# Patient Record
Sex: FEMALE | Race: WHITE | NOT HISPANIC OR LATINO | Employment: OTHER | URBAN - METROPOLITAN AREA
[De-identification: names, ages, dates, MRNs, and addresses within clinical notes are randomized per-mention and may not be internally consistent; named-entity substitution may affect disease eponyms.]

---

## 2017-04-26 ENCOUNTER — TELEPHONE (OUTPATIENT)
Dept: INTERNAL MEDICINE | Facility: CLINIC | Age: 77
End: 2017-04-26

## 2017-04-26 DIAGNOSIS — M85.80 OSTEOPENIA: ICD-10-CM

## 2017-04-26 DIAGNOSIS — E78.5 HYPERLIPIDEMIA, UNSPECIFIED HYPERLIPIDEMIA TYPE: Primary | ICD-10-CM

## 2017-04-26 DIAGNOSIS — Z79.899 ENCOUNTER FOR LONG-TERM (CURRENT) USE OF MEDICATIONS: ICD-10-CM

## 2017-04-27 ENCOUNTER — CLINICAL SUPPORT (OUTPATIENT)
Dept: INTERNAL MEDICINE | Facility: CLINIC | Age: 77
End: 2017-04-27

## 2017-04-27 ENCOUNTER — TELEPHONE (OUTPATIENT)
Dept: INTERNAL MEDICINE | Facility: CLINIC | Age: 77
End: 2017-04-27

## 2017-04-27 DIAGNOSIS — E78.49 OTHER HYPERLIPIDEMIA: Primary | ICD-10-CM

## 2017-04-27 DIAGNOSIS — Z79.899 ENCOUNTER FOR LONG-TERM (CURRENT) USE OF MEDICATIONS: ICD-10-CM

## 2017-04-27 DIAGNOSIS — E78.5 HYPERLIPIDEMIA, UNSPECIFIED HYPERLIPIDEMIA TYPE: Primary | ICD-10-CM

## 2017-04-27 DIAGNOSIS — M85.80 OSTEOPENIA: ICD-10-CM

## 2017-04-27 LAB
ALBUMIN SERPL-MCNC: 5.2 G/DL (ref 3.2–4.8)
ALBUMIN/GLOB SERPL: 2 G/DL (ref 1.5–2.5)
ALP SERPL-CCNC: 88 U/L (ref 25–100)
ALT SERPL-CCNC: 24 U/L (ref 7–40)
AST SERPL-CCNC: 34 U/L (ref 0–33)
BASOPHILS # BLD AUTO: 0.05 10*3/MM3 (ref 0–0.2)
BASOPHILS NFR BLD AUTO: 0.9 % (ref 0–1)
BILIRUB SERPL-MCNC: 1.1 MG/DL (ref 0.3–1.2)
BUN SERPL-MCNC: 14 MG/DL (ref 9–23)
BUN/CREAT SERPL: 15.6 (ref 7–25)
CALCIUM SERPL-MCNC: 11.4 MG/DL (ref 8.7–10.4)
CHLORIDE SERPL-SCNC: 105 MMOL/L (ref 99–109)
CHOLEST SERPL-MCNC: 189 MG/DL (ref 0–200)
CO2 SERPL-SCNC: 33 MMOL/L (ref 20–31)
CREAT SERPL-MCNC: 0.9 MG/DL (ref 0.6–1.3)
EOSINOPHIL # BLD AUTO: 0.11 10*3/MM3 (ref 0.1–0.3)
EOSINOPHIL NFR BLD AUTO: 1.9 % (ref 0–3)
ERYTHROCYTE [DISTWIDTH] IN BLOOD BY AUTOMATED COUNT: 12.8 % (ref 11.3–14.5)
GLOBULIN SER CALC-MCNC: 2.6 GM/DL
GLUCOSE SERPL-MCNC: 86 MG/DL (ref 70–100)
HCT VFR BLD AUTO: 44.8 % (ref 34.5–44)
HDLC SERPL-MCNC: 84 MG/DL (ref 40–60)
HGB BLD-MCNC: 14.5 G/DL (ref 11.5–15.5)
IMM GRANULOCYTES # BLD: 0 10*3/MM3 (ref 0–0.03)
IMM GRANULOCYTES NFR BLD: 0 % (ref 0–0.6)
LDLC SERPL CALC-MCNC: 88 MG/DL (ref 0–100)
LYMPHOCYTES # BLD AUTO: 1.63 10*3/MM3 (ref 0.6–4.8)
LYMPHOCYTES NFR BLD AUTO: 28.8 % (ref 24–44)
MCH RBC QN AUTO: 29.5 PG (ref 27–31)
MCHC RBC AUTO-ENTMCNC: 32.4 G/DL (ref 32–36)
MCV RBC AUTO: 91.1 FL (ref 80–99)
MONOCYTES # BLD AUTO: 0.59 10*3/MM3 (ref 0–1)
MONOCYTES NFR BLD AUTO: 10.4 % (ref 0–12)
NEUTROPHILS # BLD AUTO: 3.28 10*3/MM3 (ref 1.5–8.3)
NEUTROPHILS NFR BLD AUTO: 58 % (ref 41–71)
PLATELET # BLD AUTO: 245 10*3/MM3 (ref 150–450)
POTASSIUM SERPL-SCNC: 4.5 MMOL/L (ref 3.5–5.5)
PROT SERPL-MCNC: 7.8 G/DL (ref 5.7–8.2)
RBC # BLD AUTO: 4.92 10*6/MM3 (ref 3.89–5.14)
SODIUM SERPL-SCNC: 142 MMOL/L (ref 132–146)
TRIGL SERPL-MCNC: 87 MG/DL (ref 0–150)
TSH SERPL DL<=0.005 MIU/L-ACNC: 1.66 MIU/ML (ref 0.35–5.35)
VLDLC SERPL CALC-MCNC: 17.4 MG/DL
WBC # BLD AUTO: 5.66 10*3/MM3 (ref 3.5–10.8)

## 2017-05-01 ENCOUNTER — OFFICE VISIT (OUTPATIENT)
Dept: INTERNAL MEDICINE | Facility: CLINIC | Age: 77
End: 2017-05-01

## 2017-05-01 VITALS
TEMPERATURE: 98.1 F | HEART RATE: 64 BPM | DIASTOLIC BLOOD PRESSURE: 74 MMHG | HEIGHT: 64 IN | WEIGHT: 114.8 LBS | RESPIRATION RATE: 16 BRPM | BODY MASS INDEX: 19.6 KG/M2 | SYSTOLIC BLOOD PRESSURE: 132 MMHG

## 2017-05-01 DIAGNOSIS — R35.1 NOCTURIA: ICD-10-CM

## 2017-05-01 DIAGNOSIS — F51.01 PRIMARY INSOMNIA: ICD-10-CM

## 2017-05-01 DIAGNOSIS — Z00.00 ENCOUNTER FOR ANNUAL PHYSICAL EXAM: Primary | ICD-10-CM

## 2017-05-01 DIAGNOSIS — E83.52 HYPERCALCEMIA: ICD-10-CM

## 2017-05-01 LAB
25(OH)D3+25(OH)D2 SERPL-MCNC: 35.1 NG/ML
BILIRUB BLD-MCNC: NEGATIVE MG/DL
CLARITY, POC: CLEAR
COLOR UR: YELLOW
GLUCOSE UR STRIP-MCNC: NEGATIVE MG/DL
KETONES UR QL: NEGATIVE
LEUKOCYTE EST, POC: NEGATIVE
NITRITE UR-MCNC: NEGATIVE MG/ML
PH UR: 6 [PH] (ref 5–8)
PROT UR STRIP-MCNC: NEGATIVE MG/DL
RBC # UR STRIP: NEGATIVE /UL
SP GR UR: 1.02 (ref 1–1.03)
UROBILINOGEN UR QL: NORMAL

## 2017-05-01 PROCEDURE — G0439 PPPS, SUBSEQ VISIT: HCPCS | Performed by: INTERNAL MEDICINE

## 2017-05-01 PROCEDURE — 96160 PT-FOCUSED HLTH RISK ASSMT: CPT | Performed by: INTERNAL MEDICINE

## 2017-05-01 PROCEDURE — 81003 URINALYSIS AUTO W/O SCOPE: CPT | Performed by: INTERNAL MEDICINE

## 2017-05-01 RX ORDER — ZOLPIDEM TARTRATE 10 MG/1
10 TABLET ORAL NIGHTLY PRN
Qty: 30 TABLET | Refills: 2 | Status: SHIPPED | OUTPATIENT
Start: 2017-05-01 | End: 2017-09-01 | Stop reason: SDUPTHER

## 2017-05-01 RX ORDER — ATORVASTATIN CALCIUM 10 MG/1
10 TABLET, FILM COATED ORAL DAILY
Qty: 90 TABLET | Refills: 0 | Status: SHIPPED | OUTPATIENT
Start: 2017-05-01 | End: 2017-12-11 | Stop reason: SDUPTHER

## 2017-05-02 LAB
CA-I SERPL ISE-MCNC: 5.4 MG/DL (ref 4.5–5.6)
CALCIUM SERPL-MCNC: 9.7 MG/DL (ref 8.7–10.3)
INTACT PTH: NORMAL
PHOSPHATE SERPL-MCNC: 3.8 MG/DL (ref 2.4–5.1)
PTH-INTACT SERPL-MCNC: 46 PG/ML (ref 15–65)

## 2017-05-05 ENCOUNTER — TELEPHONE (OUTPATIENT)
Dept: INTERNAL MEDICINE | Facility: CLINIC | Age: 77
End: 2017-05-05

## 2017-06-13 ENCOUNTER — TELEPHONE (OUTPATIENT)
Dept: INTERNAL MEDICINE | Facility: CLINIC | Age: 77
End: 2017-06-13

## 2017-06-13 NOTE — TELEPHONE ENCOUNTER
mS Segura went to Er had EKG, lab done, everything looked good, her bp was elevated, she did not stay over night.

## 2017-06-13 NOTE — TELEPHONE ENCOUNTER
Spoke with Mrs. Segura's  and he said they went to [facility]  They are suppose to send everything to you.

## 2017-09-01 ENCOUNTER — OFFICE VISIT (OUTPATIENT)
Dept: INTERNAL MEDICINE | Facility: CLINIC | Age: 77
End: 2017-09-01

## 2017-09-01 VITALS
BODY MASS INDEX: 19.39 KG/M2 | DIASTOLIC BLOOD PRESSURE: 72 MMHG | HEIGHT: 64 IN | WEIGHT: 113.6 LBS | TEMPERATURE: 98.1 F | SYSTOLIC BLOOD PRESSURE: 128 MMHG

## 2017-09-01 DIAGNOSIS — F51.01 PRIMARY INSOMNIA: ICD-10-CM

## 2017-09-01 DIAGNOSIS — R00.2 PALPITATIONS: Primary | ICD-10-CM

## 2017-09-01 DIAGNOSIS — E78.00 PURE HYPERCHOLESTEROLEMIA: ICD-10-CM

## 2017-09-01 PROCEDURE — 99214 OFFICE O/P EST MOD 30 MIN: CPT | Performed by: INTERNAL MEDICINE

## 2017-09-01 RX ORDER — ZOLPIDEM TARTRATE 10 MG/1
10 TABLET ORAL NIGHTLY PRN
Qty: 30 TABLET | Refills: 2 | Status: SHIPPED | OUTPATIENT
Start: 2017-09-01 | End: 2017-12-11 | Stop reason: SDUPTHER

## 2017-09-01 NOTE — PROGRESS NOTES
"Subjective   Joselyn Segura is a 76 y.o. female.     History of Present Illness   Insomnia - she is taking 1/2-1 ambien every night, and doing pretty well overall w/ her sleep. She does tolerate it ok.. Does have decaf tea in the day- 2-3. no ETOH generally except occasionally a beer after she plays golf  1/1-4/18 will be in [place]    She occasional will feel some palpitations and low BP. Tries to avoid the heat which helps. May last 30 seconds but do seem to be happening more. Her brother and mom both had a fib. She does feel it is happening more since she got shocked in June when she was plugging in a washer. She did go to ER that night and had EKG which was OK. She has had Mohs on her nose and has not been exercising like she normally does so she wonders if she feels the palpitations more because she is not as active    The following portions of the patient's history were reviewed and updated as appropriate: allergies, current medications, past family history, past medical history, past social history, past surgical history and problem list.    Review of Systems   Constitutional: Positive for activity change. Negative for appetite change, fever and unexpected weight change.   Respiratory: Negative for shortness of breath.    Cardiovascular: Positive for palpitations. Negative for chest pain and leg swelling.   Skin:        Mohs on nose   Psychiatric/Behavioral: Positive for sleep disturbance.       Objective   Blood pressure 128/72, temperature 98.1 °F (36.7 °C), temperature source Temporal Artery , height 64.2\" (163.1 cm), weight 113 lb 9.6 oz (51.5 kg).  Physical Exam   Constitutional: She is oriented to person, place, and time. She appears well-developed and well-nourished. No distress.   HENT:   Head: Normocephalic and atraumatic.   Eyes: Conjunctivae and EOM are normal.   Cardiovascular: Normal rate, regular rhythm and normal heart sounds.  Exam reveals no gallop and no friction rub.    No murmur " heard.  Pulmonary/Chest: Effort normal and breath sounds normal. No respiratory distress. She has no wheezes.   Abdominal: Soft. Bowel sounds are normal. She exhibits no distension. Tenderness: epigastric.   Neurological: She is alert and oriented to person, place, and time.   Skin: Skin is warm and dry. She is not diaphoretic.   Psychiatric: She has a normal mood and affect. Her behavior is normal. Judgment and thought content normal.       Assessment/Plan   Joselyn was seen today for insomnia.    Diagnoses and all orders for this visit:    Palpitations  -     Holter monitor - 48 hour    Primary insomnia -Pt has already signed controlled substance contract. Evans done today and appropriate.   -     zolpidem (AMBIEN) 10 MG tablet; Take 1 tablet by mouth At Night As Needed for Sleep.      Hyperlipidemia - will plan to recheck in 12/17 before she goes to [place] for the winter    DEXA due in 4/18. kristine due 12/17. colon due 9/22

## 2017-09-12 ENCOUNTER — TELEPHONE (OUTPATIENT)
Dept: CARDIOLOGY | Facility: CLINIC | Age: 77
End: 2017-09-12

## 2017-09-12 NOTE — TELEPHONE ENCOUNTER
The patient called to cancel her holter monitor placement appt. She was scheduled for 9/15/17.   She is in a golf tournament this weekend and doesn't feel like she is able to play golf with a monitor on.  The patient wants to talk to Dr. Barriga before she wears the monitor anyway,  because she doesn't feel like she needs it.

## 2017-10-26 DIAGNOSIS — R00.2 PALPITATIONS: Primary | ICD-10-CM

## 2017-11-27 ENCOUNTER — TELEPHONE (OUTPATIENT)
Dept: INTERNAL MEDICINE | Facility: CLINIC | Age: 77
End: 2017-11-27

## 2017-11-27 NOTE — TELEPHONE ENCOUNTER
----- Message from Nohelia Barriga MD sent at 11/26/2017  2:29 PM EST -----  Can you let her know, the holter does not show any a fib, but did show a few runs of a fast heart rhythm called SVT. If she can come in soon, we can discuss possible medication for this, and we can also do her fasting labs too

## 2017-11-29 ENCOUNTER — TELEPHONE (OUTPATIENT)
Dept: INTERNAL MEDICINE | Facility: CLINIC | Age: 77
End: 2017-11-29

## 2017-11-29 DIAGNOSIS — I47.1 SVT (SUPRAVENTRICULAR TACHYCARDIA) (HCC): ICD-10-CM

## 2017-11-29 DIAGNOSIS — E78.00 PURE HYPERCHOLESTEROLEMIA: Primary | ICD-10-CM

## 2017-12-06 ENCOUNTER — LAB REQUISITION (OUTPATIENT)
Dept: LAB | Facility: HOSPITAL | Age: 77
End: 2017-12-06

## 2017-12-06 DIAGNOSIS — Z00.00 ROUTINE GENERAL MEDICAL EXAMINATION AT A HEALTH CARE FACILITY: ICD-10-CM

## 2017-12-06 LAB
ALBUMIN SERPL-MCNC: 4.7 G/DL (ref 3.2–4.8)
ALBUMIN/GLOB SERPL: 2 G/DL (ref 1.5–2.5)
ALP SERPL-CCNC: 85 U/L (ref 25–100)
ALT SERPL W P-5'-P-CCNC: 24 U/L (ref 7–40)
ANION GAP SERPL CALCULATED.3IONS-SCNC: 6 MMOL/L (ref 3–11)
ARTICHOKE IGE QN: 63 MG/DL (ref 0–130)
AST SERPL-CCNC: 31 U/L (ref 0–33)
BASOPHILS # BLD AUTO: 0.04 10*3/MM3 (ref 0–0.2)
BASOPHILS NFR BLD AUTO: 0.9 % (ref 0–1)
BILIRUB SERPL-MCNC: 0.9 MG/DL (ref 0.3–1.2)
BUN BLD-MCNC: 16 MG/DL (ref 9–23)
BUN/CREAT SERPL: 20 (ref 7–25)
CALCIUM SPEC-SCNC: 10 MG/DL (ref 8.7–10.4)
CHLORIDE SERPL-SCNC: 104 MMOL/L (ref 99–109)
CHOLEST SERPL-MCNC: 155 MG/DL (ref 0–200)
CO2 SERPL-SCNC: 30 MMOL/L (ref 20–31)
CREAT BLD-MCNC: 0.8 MG/DL (ref 0.6–1.3)
DEPRECATED RDW RBC AUTO: 45.5 FL (ref 37–54)
EOSINOPHIL # BLD AUTO: 0.1 10*3/MM3 (ref 0–0.3)
EOSINOPHIL NFR BLD AUTO: 2.3 % (ref 0–3)
ERYTHROCYTE [DISTWIDTH] IN BLOOD BY AUTOMATED COUNT: 13.3 % (ref 11.3–14.5)
GFR SERPL CREATININE-BSD FRML MDRD: 69 ML/MIN/1.73
GLOBULIN UR ELPH-MCNC: 2.3 GM/DL
GLUCOSE BLD-MCNC: 92 MG/DL (ref 70–100)
HCT VFR BLD AUTO: 43.3 % (ref 34.5–44)
HDLC SERPL-MCNC: 79 MG/DL (ref 40–60)
HGB BLD-MCNC: 13.6 G/DL (ref 11.5–15.5)
IMM GRANULOCYTES # BLD: 0 10*3/MM3 (ref 0–0.03)
IMM GRANULOCYTES NFR BLD: 0 % (ref 0–0.6)
LYMPHOCYTES # BLD AUTO: 1.67 10*3/MM3 (ref 0.6–4.8)
LYMPHOCYTES NFR BLD AUTO: 38.2 % (ref 24–44)
MCH RBC QN AUTO: 29.1 PG (ref 27–31)
MCHC RBC AUTO-ENTMCNC: 31.4 G/DL (ref 32–36)
MCV RBC AUTO: 92.7 FL (ref 80–99)
MONOCYTES # BLD AUTO: 0.55 10*3/MM3 (ref 0–1)
MONOCYTES NFR BLD AUTO: 12.6 % (ref 0–12)
NEUTROPHILS # BLD AUTO: 2.01 10*3/MM3 (ref 1.5–8.3)
NEUTROPHILS NFR BLD AUTO: 46 % (ref 41–71)
PLATELET # BLD AUTO: 246 10*3/MM3 (ref 150–450)
PMV BLD AUTO: 11.3 FL (ref 6–12)
POTASSIUM BLD-SCNC: 4.2 MMOL/L (ref 3.5–5.5)
PROT SERPL-MCNC: 7 G/DL (ref 5.7–8.2)
RBC # BLD AUTO: 4.67 10*6/MM3 (ref 3.89–5.14)
SODIUM BLD-SCNC: 140 MMOL/L (ref 132–146)
TRIGL SERPL-MCNC: 54 MG/DL (ref 0–150)
TSH SERPL DL<=0.05 MIU/L-ACNC: 1.48 MIU/ML (ref 0.35–5.35)
WBC NRBC COR # BLD: 4.37 10*3/MM3 (ref 3.5–10.8)

## 2017-12-06 PROCEDURE — 85025 COMPLETE CBC W/AUTO DIFF WBC: CPT

## 2017-12-06 PROCEDURE — 80061 LIPID PANEL: CPT

## 2017-12-06 PROCEDURE — 84443 ASSAY THYROID STIM HORMONE: CPT

## 2017-12-06 PROCEDURE — 80053 COMPREHEN METABOLIC PANEL: CPT

## 2017-12-11 ENCOUNTER — OFFICE VISIT (OUTPATIENT)
Dept: INTERNAL MEDICINE | Facility: CLINIC | Age: 77
End: 2017-12-11

## 2017-12-11 VITALS
HEART RATE: 72 BPM | OXYGEN SATURATION: 98 % | TEMPERATURE: 98.2 F | SYSTOLIC BLOOD PRESSURE: 124 MMHG | BODY MASS INDEX: 19.41 KG/M2 | DIASTOLIC BLOOD PRESSURE: 72 MMHG | WEIGHT: 113.8 LBS

## 2017-12-11 DIAGNOSIS — I47.1 SVT (SUPRAVENTRICULAR TACHYCARDIA) (HCC): Primary | ICD-10-CM

## 2017-12-11 DIAGNOSIS — E78.00 PURE HYPERCHOLESTEROLEMIA: ICD-10-CM

## 2017-12-11 DIAGNOSIS — M25.561 ACUTE PAIN OF RIGHT KNEE: ICD-10-CM

## 2017-12-11 DIAGNOSIS — F51.01 PRIMARY INSOMNIA: ICD-10-CM

## 2017-12-11 PROCEDURE — 99214 OFFICE O/P EST MOD 30 MIN: CPT | Performed by: INTERNAL MEDICINE

## 2017-12-11 RX ORDER — ZOLPIDEM TARTRATE 10 MG/1
10 TABLET ORAL NIGHTLY PRN
Qty: 30 TABLET | Refills: 2 | Status: SHIPPED | OUTPATIENT
Start: 2017-12-11 | End: 2018-05-04 | Stop reason: SDUPTHER

## 2017-12-11 RX ORDER — METOPROLOL SUCCINATE 25 MG/1
TABLET, EXTENDED RELEASE ORAL
Qty: 30 TABLET | Refills: 5 | Status: SHIPPED | OUTPATIENT
Start: 2017-12-11 | End: 2018-11-07 | Stop reason: SDUPTHER

## 2017-12-11 RX ORDER — ATORVASTATIN CALCIUM 10 MG/1
10 TABLET, FILM COATED ORAL DAILY
Qty: 90 TABLET | Refills: 1 | Status: SHIPPED | OUTPATIENT
Start: 2017-12-11 | End: 2018-06-10 | Stop reason: SDUPTHER

## 2017-12-11 NOTE — PROGRESS NOTES
Subjective   Joselyn Segura is a 77 y.o. female.     History of Present Illness     Here for a f/u on:    Insomnia - she is taking 1/2-1 ambien every night, and doing pretty well overall w/ her sleep, though in a cycle right now wher the sleep is not as good. She does tolerate it ok    Palpitations - the holter did not show any a fib, but did show a few runs of SVT. 7 runs, longest was 7 beats.  She does feel like she is having these at least daily.   She has felt like being in the heat, even if she is not playing tennis, will be a trigger. Her BP will tend to go very low during these episodes. She tries to drink plenty of fluids - mixes water/gatorade together. She has decaf coffee just occasionally and decaf tea 3 glasses/day   1/2-4/7 will be in [place]. She does play golf and tennis 4x/week there. She will be back in [place] the week of 2/1    R knee pain over the last 4 days.did seem swollen initially but not now. no injury.(h/o left ACL injury, but no problems on the right). Has had some ibuprofen which helped    The following portions of the patient's history were reviewed and updated as appropriate: allergies, current medications, past family history, past medical history, past social history, past surgical history and problem list.    Review of Systems   Constitutional: Negative for activity change, appetite change and unexpected weight change.   Cardiovascular: Positive for palpitations. Negative for leg swelling.   Musculoskeletal: Positive for arthralgias (R knee) and myalgias (after doing yoga both legs ached for a few days, but better now).   Psychiatric/Behavioral: Positive for sleep disturbance.       Objective   Blood pressure 124/72, pulse 72, temperature 98.2 °F (36.8 °C), temperature source Temporal Artery , weight 51.6 kg (113 lb 12.8 oz), SpO2 98 %.  Physical Exam   Constitutional: She is oriented to person, place, and time. She appears well-developed and well-nourished. No distress.   HENT:    Head: Normocephalic and atraumatic.   Eyes: Conjunctivae and EOM are normal.   Cardiovascular: Normal rate, regular rhythm and normal heart sounds.  Exam reveals no gallop and no friction rub.    No murmur heard.  Pulmonary/Chest: Effort normal and breath sounds normal. No respiratory distress. She has no wheezes.   Musculoskeletal: She exhibits tenderness (R knee medial joint line). She exhibits no edema.   No effusion. + crepitus. Neg sudhir   Neurological: She is alert and oriented to person, place, and time.   Skin: Skin is warm and dry. She is not diaphoretic.   Psychiatric: She has a normal mood and affect. Her behavior is normal. Judgment and thought content normal.       Assessment/Plan   Joselyn was seen today for results, med refill, hyperlipidemia and insomnia.    Diagnoses and all orders for this visit:    SVT (supraventricular tachycardia) - we will start toprol xl. She will monitor BP on this. If she cannot tolerate or episodes worsen we can have her see cardiologist. She will let me know. She will be back in town the week of 2/1/18  -     metoprolol succinate XL (TOPROL XL) 25 MG 24 hr tablet; 1/2-1 qhs    Primary insomnia--Pt has already signed controlled substance contract. Evans done today and appropriate. Will do UDS next visit       zolpidem (A -MBIEN) 10 MG tablet; Take 1 tablet by mouth At Night As Needed for Sleep.    Pure hypercholesterolemia - recheck in 6m  -     atorvastatin (LIPITOR) 10 MG tablet; Take 1 tablet by mouth Daily.      Acute pain of right knee - ok to continue ibuprofen prn. Would use knee sleeve when she plays tennis later today. Call if no better and we can xray      She had flu vaccine already

## 2017-12-29 ENCOUNTER — TELEPHONE (OUTPATIENT)
Dept: INTERNAL MEDICINE | Facility: CLINIC | Age: 77
End: 2017-12-29

## 2017-12-29 NOTE — TELEPHONE ENCOUNTER
Pt called and BP was high 165/85, pulse 94. Pulse has been 70 or higher. She has felt ok on the 1/2 dose of the toprol. Will go ahead to the full tablet and she will continue to monitor

## 2018-05-01 ENCOUNTER — TELEPHONE (OUTPATIENT)
Dept: INTERNAL MEDICINE | Facility: CLINIC | Age: 78
End: 2018-05-01

## 2018-05-01 NOTE — TELEPHONE ENCOUNTER
PATIENT CALLED BACK AND AN APPOINTMENT WAS MADE FOR Friday 5/4 SHE THINKS SHE MAY HAVE SHINGLES.RED OVER HER RIGHT EYE. SHE IS SCHEDULED FOR ARTHROSCOPIC SURGERY ON HER KNEE AND IS CONCERNED ABOUT HAVING SHINGLES.

## 2018-05-04 ENCOUNTER — OFFICE VISIT (OUTPATIENT)
Dept: INTERNAL MEDICINE | Facility: CLINIC | Age: 78
End: 2018-05-04

## 2018-05-04 VITALS
SYSTOLIC BLOOD PRESSURE: 122 MMHG | OXYGEN SATURATION: 97 % | WEIGHT: 116 LBS | DIASTOLIC BLOOD PRESSURE: 78 MMHG | HEIGHT: 64 IN | HEART RATE: 69 BPM | TEMPERATURE: 99.1 F | BODY MASS INDEX: 19.81 KG/M2

## 2018-05-04 DIAGNOSIS — B02.30 HERPES ZOSTER WITH OPHTHALMIC COMPLICATION, UNSPECIFIED HERPES ZOSTER EYE DISEASE: Primary | ICD-10-CM

## 2018-05-04 DIAGNOSIS — L29.9 ITCHING: ICD-10-CM

## 2018-05-04 DIAGNOSIS — F51.01 PRIMARY INSOMNIA: ICD-10-CM

## 2018-05-04 PROCEDURE — 99214 OFFICE O/P EST MOD 30 MIN: CPT | Performed by: NURSE PRACTITIONER

## 2018-05-04 RX ORDER — VALACYCLOVIR HYDROCHLORIDE 1 G/1
1000 TABLET, FILM COATED ORAL 3 TIMES DAILY
Qty: 21 TABLET | Refills: 0 | Status: SHIPPED | OUTPATIENT
Start: 2018-05-04 | End: 2018-06-06

## 2018-05-04 RX ORDER — ZOLPIDEM TARTRATE 10 MG/1
10 TABLET ORAL NIGHTLY PRN
Qty: 30 TABLET | Refills: 0 | Status: SHIPPED | OUTPATIENT
Start: 2018-05-04 | End: 2018-06-06 | Stop reason: SDUPTHER

## 2018-05-04 RX ORDER — HYDROXYZINE HYDROCHLORIDE 25 MG/1
25 TABLET, FILM COATED ORAL 3 TIMES DAILY PRN
Qty: 45 TABLET | Refills: 0 | Status: SHIPPED | OUTPATIENT
Start: 2018-05-04 | End: 2018-06-06

## 2018-05-04 NOTE — PROGRESS NOTES
Subjective   Joselyn Segura is a 77 y.o. female.     Eye Pain    The right eye is affected. This is a new problem. The current episode started in the past 7 days (4 days). The problem occurs constantly. The problem has been gradually worsening. There was no injury mechanism. The pain is mild. There is no known exposure to pink eye. She does not wear contacts. Pertinent negatives include no blurred vision, eye discharge, double vision, eye redness, fever, foreign body sensation, itching, nausea, photophobia, recent URI or vomiting. Treatments tried: tea tree oil to right hairline. The treatment provided no relief.   Rash   This is a new problem. The current episode started in the past 7 days. The affected locations include the scalp, face and right eye (right arm and leg are itching, but no rash). The rash is characterized by burning. Associated with: tried teatree oil, but burning was present before. Pertinent negatives include no anorexia, congestion, cough, diarrhea, eye pain (around eye, not in eye, no pain with EOM's), facial edema, fatigue, fever, nail changes, rhinorrhea, shortness of breath, sore throat or vomiting. The treatment provided no relief. Her past medical history is significant for varicella. There is no history of asthma or eczema.      has had shingles in the past and this feels similar to her.   Has Zostavax  Has dry flaky white area to right hairline- thinks she might have dandruff.   Wants refill on her ambien. Has not been seen by Dr. Barriga since 12/2017.   She is having knee surgery next Thursday.    The following portions of the patient's history were reviewed and updated as appropriate: allergies, current medications, past family history, past medical history, past social history, past surgical history and problem list.    Review of Systems   Constitutional: Negative for activity change, appetite change, chills, diaphoresis, fatigue, fever and unexpected weight change.   HENT: Negative for  congestion, rhinorrhea and sore throat.    Eyes: Positive for itching. Negative for blurred vision, double vision, photophobia, pain (around eye, not in eye, no pain with EOM's), discharge, redness and visual disturbance.   Respiratory: Negative for cough and shortness of breath.    Gastrointestinal: Negative for anorexia, diarrhea, nausea and vomiting.   Musculoskeletal: Positive for arthralgias (knee- planned knee surgery next week).   Skin: Positive for color change and rash. Negative for itching and nail changes.        Itching       Objective   Physical Exam   Constitutional: She appears well-developed and well-nourished.   HENT:   Head: Normocephalic.       Eyes: Conjunctivae, EOM and lids are normal. Pupils are equal, round, and reactive to light. Right eye exhibits no chemosis. Left eye exhibits no chemosis.   Cardiovascular: Normal rate.    Pulmonary/Chest: Effort normal. No respiratory distress.   Abdominal: Soft. Bowel sounds are normal.   Nursing note and vitals reviewed.      Assessment/Plan      Joselyn was seen today for red, swollen eye and right arm and leg are also itching..    Diagnoses and all orders for this visit:    Herpes zoster with ophthalmic complication, unspecified herpes zoster eye disease  -     valACYclovir (VALTREX) 1000 MG tablet; Take 1 tablet by mouth 3 (Three) Times a Day.  -     hydrOXYzine (ATARAX) 25 MG tablet; Take 1 tablet by mouth 3 (Three) Times a Day As Needed for Itching.    Itching        Return in about 3 days (around 5/7/2018), and next available FU with Linus for routine FU.  Referred her to her ophthalmologist; if symptoms worsen she will go  Stop the tea tree oil.   Hydroxyzine as needed AE discussed  Dr. Barriga will give her 1 month of Ambien; pt must schedule to see her  Plan of care discussed with pt. They verbalized understanding and agreement.

## 2018-05-07 ENCOUNTER — OFFICE VISIT (OUTPATIENT)
Dept: INTERNAL MEDICINE | Facility: CLINIC | Age: 78
End: 2018-05-07

## 2018-05-07 VITALS
TEMPERATURE: 98.2 F | DIASTOLIC BLOOD PRESSURE: 62 MMHG | SYSTOLIC BLOOD PRESSURE: 134 MMHG | WEIGHT: 116.4 LBS | BODY MASS INDEX: 19.87 KG/M2 | OXYGEN SATURATION: 98 % | HEIGHT: 64 IN | HEART RATE: 73 BPM

## 2018-05-07 DIAGNOSIS — B02.30 HERPES ZOSTER WITH OPHTHALMIC COMPLICATION, UNSPECIFIED HERPES ZOSTER EYE DISEASE: ICD-10-CM

## 2018-05-07 DIAGNOSIS — L21.9 SEBORRHEIC DERMATITIS OF SCALP: ICD-10-CM

## 2018-05-07 DIAGNOSIS — L21.9 SEBORRHEIC DERMATITIS: Primary | ICD-10-CM

## 2018-05-07 PROCEDURE — 99214 OFFICE O/P EST MOD 30 MIN: CPT | Performed by: NURSE PRACTITIONER

## 2018-05-07 RX ORDER — CLOTRIMAZOLE 1 %
CREAM (GRAM) TOPICAL 2 TIMES DAILY
Qty: 24 G | Refills: 0 | Status: SHIPPED | OUTPATIENT
Start: 2018-05-07 | End: 2018-12-10

## 2018-05-07 RX ORDER — KETOCONAZOLE 20 MG/ML
SHAMPOO TOPICAL 2 TIMES WEEKLY
Qty: 120 ML | Refills: 0 | Status: SHIPPED | OUTPATIENT
Start: 2018-05-07 | End: 2018-06-06

## 2018-05-07 NOTE — PROGRESS NOTES
Subjective   Joselyn Segura is a 77 y.o. female.     History of Present Illness   Pt was seen on Friday with c/o rash and burning pain. This was felt to be shingles and treated with valtrex. She has had improvement in burning around eye but has a rash above the area.   Eye Pain    The right eye is affected. This is a new problem. The current episode started in the past 7 days. The problem occurs constantly. The problem has been gradually improving. There was no injury mechanism. The pain is resolved today. There is no known exposure to pink eye. She does not wear contacts. Pertinent negatives include no blurred vision, eye discharge, double vision, eye redness, fever, foreign body sensation, itching, nausea, photophobia, recent URI or vomiting. Treatments tried: tea tree oil to right hairline, hydroxyzine, and valtrex. The tea tree provided no relief, but the valtrex  And hydroxyzine has resolved the pain and swelling.   Rash   This is a new problem. The current episode started in the past 7 days. The affected locations include the scalp, face.  The rash is characterized by burning. Associated with: tried teatree oil, but burning was present before. Feels like rash is improving but it is still dry, flaky, and itchy.  Pertinent negatives include no anorexia, congestion, cough, diarrhea, eye pain, no pain with EOM's) facial edema, fatigue, fever, nail changes, rhinorrhea, shortness of breath, sore throat or vomiting. The treatment provided no relief. Her past medical history is significant for varicella. There is no history of asthma or eczema.      has had shingles in the past and this felt similar to her.   Had Zostavax  Still has dry flaky white area to right hairline- thinks she might have dandruff.     The following portions of the patient's history were reviewed and updated as appropriate: allergies, current medications, past family history, past medical history, past social history, past surgical history and  problem list.    Review of Systems   Constitutional: Negative for activity change, appetite change, chills, diaphoresis, fatigue, fever and unexpected weight change.   HENT: Negative for congestion, rhinorrhea and sore throat.    Eyes: Negative for photophobia, pain (resolved), discharge, redness, itching and visual disturbance.   Respiratory: Negative for cough and shortness of breath.    Gastrointestinal: Negative for diarrhea, nausea and vomiting.   Musculoskeletal: Positive for arthralgias (knee- planned knee surgery next week).   Skin: Positive for color change and rash.        Itching       Objective   Physical Exam   Constitutional: She appears well-developed and well-nourished.   HENT:   Head: Normocephalic.       Eyes: Conjunctivae, EOM and lids are normal. Pupils are equal, round, and reactive to light. Right eye exhibits no chemosis. Left eye exhibits no chemosis.   Cardiovascular: Normal rate.    Pulmonary/Chest: Effort normal. No respiratory distress.   Abdominal: Soft. Bowel sounds are normal.   Nursing note and vitals reviewed.      Assessment/Plan      Joselyn was seen today for 3 day follow up from rash.    Diagnoses and all orders for this visit:    Seborrheic dermatitis  -     clotrimazole (LOTRIMIN) 1 % cream; Apply  topically 2 (Two) Times a Day. For 2-4 weeks    Seborrheic dermatitis of scalp  -     ketoconazole (NIZORAL) 2 % shampoo; Apply  topically 2 (Two) Times a Week.    Herpes zoster with ophthalmic complication, unspecified herpes zoster eye disease-resolving    Finish valtrex  Ketoconazole shampoo as directed  Lotrimin to facial rash  Return if symptoms worsen or fail to improve.  Plan of care discussed with pt. They verbalized understanding and agreement.

## 2018-06-06 ENCOUNTER — OFFICE VISIT (OUTPATIENT)
Dept: INTERNAL MEDICINE | Facility: CLINIC | Age: 78
End: 2018-06-06

## 2018-06-06 VITALS
WEIGHT: 114.4 LBS | HEIGHT: 64 IN | OXYGEN SATURATION: 97 % | SYSTOLIC BLOOD PRESSURE: 136 MMHG | BODY MASS INDEX: 19.53 KG/M2 | TEMPERATURE: 98.4 F | DIASTOLIC BLOOD PRESSURE: 72 MMHG | HEART RATE: 60 BPM

## 2018-06-06 DIAGNOSIS — E78.00 PURE HYPERCHOLESTEROLEMIA: Primary | ICD-10-CM

## 2018-06-06 DIAGNOSIS — F51.01 PRIMARY INSOMNIA: ICD-10-CM

## 2018-06-06 LAB
ALBUMIN SERPL-MCNC: 4.54 G/DL (ref 3.2–4.8)
ALBUMIN/GLOB SERPL: 1.8 G/DL (ref 1.5–2.5)
ALP SERPL-CCNC: 73 U/L (ref 25–100)
ALT SERPL-CCNC: 19 U/L (ref 7–40)
AST SERPL-CCNC: 20 U/L (ref 0–33)
BILIRUB SERPL-MCNC: 0.8 MG/DL (ref 0.3–1.2)
BUN SERPL-MCNC: 19 MG/DL (ref 9–23)
BUN/CREAT SERPL: 21.8 (ref 7–25)
CALCIUM SERPL-MCNC: 9.7 MG/DL (ref 8.7–10.4)
CHLORIDE SERPL-SCNC: 102 MMOL/L (ref 99–109)
CHOLEST SERPL-MCNC: 152 MG/DL (ref 0–200)
CO2 SERPL-SCNC: 29 MMOL/L (ref 20–31)
CREAT SERPL-MCNC: 0.87 MG/DL (ref 0.6–1.3)
GFR SERPLBLD CREATININE-BSD FMLA CKD-EPI: 63 ML/MIN/1.73
GFR SERPLBLD CREATININE-BSD FMLA CKD-EPI: 76 ML/MIN/1.73
GLOBULIN SER CALC-MCNC: 2.5 GM/DL
GLUCOSE SERPL-MCNC: 88 MG/DL (ref 70–100)
HDLC SERPL-MCNC: 70 MG/DL (ref 40–60)
LDLC SERPL CALC-MCNC: 71 MG/DL (ref 0–100)
POTASSIUM SERPL-SCNC: 4.5 MMOL/L (ref 3.5–5.5)
PROT SERPL-MCNC: 7 G/DL (ref 5.7–8.2)
SODIUM SERPL-SCNC: 140 MMOL/L (ref 132–146)
TRIGL SERPL-MCNC: 56 MG/DL (ref 0–150)
VLDLC SERPL CALC-MCNC: 11.2 MG/DL

## 2018-06-06 PROCEDURE — 99214 OFFICE O/P EST MOD 30 MIN: CPT | Performed by: INTERNAL MEDICINE

## 2018-06-06 RX ORDER — ZOLPIDEM TARTRATE 10 MG/1
10 TABLET ORAL NIGHTLY PRN
Qty: 30 TABLET | Refills: 2 | Status: SHIPPED | OUTPATIENT
Start: 2018-06-06 | End: 2018-10-05 | Stop reason: SDUPTHER

## 2018-06-06 NOTE — PROGRESS NOTES
History of Present Illness   Here for f/u on:    Insomnia - she is taking 1/2-1 ambien every night, and doing pretty well overall w/ her sleep    Swelling and rash around R eye last month. Pt is not for sure it was shingles after all in hindsite, possibly allergic reaction. It has resolved completley    R knee surgery, meniscal repair 5/10 w/ Dr Merino. She is doing PT and making lsow improvement. She is using ibuprofen; filled the percocet, but never used    H/o SVT on holter last year - she is taking 1/2 dose of metoprolol. Still finds she gets low BP when she is in the sun, and does feel her heart races then, despite pushing fluids    The following portions of the patient's history were reviewed and updated as appropriate: allergies, current medications, past family history, past medical history, past social history, past surgical history and problem list.    Review of Systems   Constitutional: Negative for fatigue and fever.   Respiratory: Negative for shortness of breath.    Cardiovascular: Negative for chest pain. +palpitations still some - taking metoprolol 1/2 qd  Gastrointestinal: Negative for abdominal pain. has had more constipation. Stool softener   MS: r knee surgery for menisuc - some swelling, but better  Skin: recent shingles  Psych: chronic insomnia - stable w/ ambien      Objective    Vitals:    06/06/18 1103   BP: 136/72   Pulse: 60   Temp: 98.4 °F (36.9 °C)   SpO2: 97%     Physical Exam   Constitutional: oriented to person, place, and time. well-developed and well-nourished. No distress.   HENT:   Head: Normocephalic and atraumatic.   Eyes: Conjunctivae and EOM are normal.   Cardiovascular: Normal rate, regular rhythm and normal heart sounds.  Exam reveals no gallop and no friction rub.    No murmur heard.  Pulmonary/Chest: Effort normal and breath sounds normal. No respiratory distress.  no wheezes.   Abd: soft, NTND  Neurological:  alert and oriented to person, place, and time.   Skin: Skin is  warm and dry. not diaphoretic.   Psychiatric: normal mood and affect. Behavior and judgement normal  MS: R knee - slightly swollen; incision looks good    Assessment/Plan   Joselyn was seen today for med refill, hyperlipidemia, insomnia and hypertension.    Diagnoses and all orders for this visit:    Pure hypercholesterolemia - on lipitor  -     Comprehensive Metabolic Panel  -     Lipid Panel    Primary insomnia - Pt has already signed controlled substance contract. Evans done today and appropriate.   -     zolpidem (AMBIEN) 10 MG tablet; Take 1 tablet by mouth At Night As Needed for Sleep.        Preventative: shingrix discussed -  can check at pharmacy since we do not have

## 2018-06-10 RX ORDER — ATORVASTATIN CALCIUM 10 MG/1
10 TABLET, FILM COATED ORAL DAILY
Qty: 90 TABLET | Refills: 1 | Status: SHIPPED | OUTPATIENT
Start: 2018-06-10 | End: 2019-06-26 | Stop reason: SDUPTHER

## 2018-08-01 ENCOUNTER — TELEPHONE (OUTPATIENT)
Dept: INTERNAL MEDICINE | Facility: CLINIC | Age: 78
End: 2018-08-01

## 2018-08-01 RX ORDER — TERBINAFINE HYDROCHLORIDE 250 MG/1
250 TABLET ORAL DAILY
Qty: 42 TABLET | Refills: 0 | Status: SHIPPED | OUTPATIENT
Start: 2018-08-01 | End: 2018-12-10

## 2018-08-01 NOTE — TELEPHONE ENCOUNTER
Pt sent pictures to me of her fingernails on both hands - yellow, thickened, discolored. We will go ahead and use lamisil x 6 weeks. Her LFTs were normal in June

## 2018-10-05 ENCOUNTER — TELEPHONE (OUTPATIENT)
Dept: INTERNAL MEDICINE | Facility: CLINIC | Age: 78
End: 2018-10-05

## 2018-10-05 DIAGNOSIS — F51.01 PRIMARY INSOMNIA: ICD-10-CM

## 2018-10-05 RX ORDER — ZOLPIDEM TARTRATE 10 MG/1
10 TABLET ORAL NIGHTLY PRN
Qty: 10 TABLET | Refills: 0 | Status: SHIPPED | OUTPATIENT
Start: 2018-10-05 | End: 2018-10-15 | Stop reason: SDUPTHER

## 2018-10-05 NOTE — TELEPHONE ENCOUNTER
PN she needs an appointment and it was made for 10/15/18.  She has about 5 left.  Can you send in enough until her appointment.

## 2018-10-14 PROBLEM — I47.1 SVT (SUPRAVENTRICULAR TACHYCARDIA): Status: ACTIVE | Noted: 2018-10-14

## 2018-10-14 PROBLEM — I47.10 SVT (SUPRAVENTRICULAR TACHYCARDIA): Status: ACTIVE | Noted: 2018-10-14

## 2018-10-14 NOTE — PROGRESS NOTES
"Subjective   Joselyn Segura is a 78 y.o. female.     History of Present Illness   Insomnia - she is taking 1/2-1 ambien every night, and doing pretty well overall w/ her sleep.     R meniscal repair in 5/18 - - playing tennis now and doing OK    SVT - still feels it occasionally. Taking 1/2 dose metoprolol daily    Left hand finger nails - discolored. Starting lamisil in 8/18 - finished a few weeks ago. Still w/ the discoloration    The following portions of the patient's history were reviewed and updated as appropriate: allergies, current medications, past family history, past medical history, past social history, past surgical history and problem list.    Review of Systems   Constitutional: Negative for unexpected weight gain and unexpected weight loss.   Musculoskeletal: Positive for arthralgias.   Skin:        Nail dicoloration   Psychiatric/Behavioral: Positive for sleep disturbance. Negative for stress.       Objective    Vitals:    10/15/18 1449   BP: 140/68   Pulse: 58   Resp: 16   Temp: 99.4 °F (37.4 °C)   TempSrc: Temporal Artery    SpO2: 98%   Weight: 50.8 kg (112 lb)   Height: 163.1 cm (64.2\")   recheck 140/66    Physical Exam   Constitutional: She is oriented to person, place, and time. She appears well-developed and well-nourished. No distress.   HENT:   Head: Normocephalic and atraumatic.   Eyes: Pupils are equal, round, and reactive to light. EOM are normal.   Neck: Normal range of motion. Neck supple.   Cardiovascular: Normal rate and regular rhythm.    Pulmonary/Chest: Effort normal and breath sounds normal.   Musculoskeletal: She exhibits no edema.   Neurological: She is alert and oriented to person, place, and time. No cranial nerve deficit.   Skin: Skin is warm and dry. No rash noted. She is not diaphoretic.   Psychiatric: She has a normal mood and affect. Her behavior is normal. Judgment and thought content normal.         Assessment/Plan   Joselyn was seen today for insomnia.    Diagnoses and " all orders for this visit:    Primary insomnia - Pt has already signed controlled substance contract. Evans done today and appropriate.   -     zolpidem (AMBIEN) 10 MG tablet; Take 1 tablet by mouth At Night As Needed for Sleep.    Pure hypercholesterolemia -on lipitor,  recheck in 3m    SVT (supraventricular tachycardia) (CMS/HCC) - on metoprolol    Elevated blood pressure reading today - she iwll yasmin and let me know if stays high    She will get flu vaccine at pharmacy.

## 2018-10-15 ENCOUNTER — OFFICE VISIT (OUTPATIENT)
Dept: INTERNAL MEDICINE | Facility: CLINIC | Age: 78
End: 2018-10-15

## 2018-10-15 VITALS
OXYGEN SATURATION: 98 % | TEMPERATURE: 99.4 F | WEIGHT: 112 LBS | RESPIRATION RATE: 16 BRPM | SYSTOLIC BLOOD PRESSURE: 140 MMHG | HEART RATE: 58 BPM | BODY MASS INDEX: 19.12 KG/M2 | DIASTOLIC BLOOD PRESSURE: 68 MMHG | HEIGHT: 64 IN

## 2018-10-15 DIAGNOSIS — F51.01 PRIMARY INSOMNIA: Primary | ICD-10-CM

## 2018-10-15 DIAGNOSIS — E78.00 PURE HYPERCHOLESTEROLEMIA: ICD-10-CM

## 2018-10-15 DIAGNOSIS — R03.0 ELEVATED BLOOD PRESSURE READING: ICD-10-CM

## 2018-10-15 DIAGNOSIS — I47.1 SVT (SUPRAVENTRICULAR TACHYCARDIA) (HCC): ICD-10-CM

## 2018-10-15 PROCEDURE — 99214 OFFICE O/P EST MOD 30 MIN: CPT | Performed by: INTERNAL MEDICINE

## 2018-10-15 RX ORDER — ZOLPIDEM TARTRATE 10 MG/1
10 TABLET ORAL NIGHTLY PRN
Qty: 30 TABLET | Refills: 2 | Status: SHIPPED | OUTPATIENT
Start: 2018-10-15 | End: 2019-01-28

## 2018-11-07 ENCOUNTER — OFFICE VISIT (OUTPATIENT)
Dept: INTERNAL MEDICINE | Facility: CLINIC | Age: 78
End: 2018-11-07

## 2018-11-07 VITALS
TEMPERATURE: 98.6 F | WEIGHT: 116.4 LBS | HEART RATE: 68 BPM | OXYGEN SATURATION: 99 % | SYSTOLIC BLOOD PRESSURE: 142 MMHG | HEIGHT: 64 IN | BODY MASS INDEX: 19.87 KG/M2 | DIASTOLIC BLOOD PRESSURE: 74 MMHG

## 2018-11-07 DIAGNOSIS — I47.1 SVT (SUPRAVENTRICULAR TACHYCARDIA) (HCC): ICD-10-CM

## 2018-11-07 DIAGNOSIS — K29.00 OTHER ACUTE GASTRITIS WITHOUT HEMORRHAGE: Primary | ICD-10-CM

## 2018-11-07 PROCEDURE — 99214 OFFICE O/P EST MOD 30 MIN: CPT | Performed by: INTERNAL MEDICINE

## 2018-11-07 RX ORDER — OMEPRAZOLE 20 MG/1
20 CAPSULE, DELAYED RELEASE ORAL DAILY
Qty: 30 CAPSULE | Refills: 2 | Status: SHIPPED | OUTPATIENT
Start: 2018-11-07 | End: 2020-08-26

## 2018-11-07 RX ORDER — METOPROLOL SUCCINATE 25 MG/1
25 TABLET, EXTENDED RELEASE ORAL DAILY
Qty: 30 TABLET | Refills: 5 | Status: SHIPPED | OUTPATIENT
Start: 2018-11-07 | End: 2019-04-22 | Stop reason: SDUPTHER

## 2018-11-07 NOTE — PROGRESS NOTES
"Subjective   Joselyn Segura is a 78 y.o. female.     History of Present Illness     Abdominal pain  She had EGD in '15 that showed gastritis and HH, no H pylo (Dr Sparks). She started prilosec and has been on it for about 7 days and is better now. Had one episode of GERD  Trying to do less caffeine.. Only one soda a week. Not a lot of spicy foods. No nsaids; occasionally beer in the summer, but none recently    HTN - BPs have been running higher, and she is now on 1 metoprolol daily; still gets fairly frequent irregular readings, but BPs are better 120-130s/70-80    The following portions of the patient's history were reviewed and updated as appropriate: allergies, current medications, past family history, past medical history, past social history, past surgical history and problem list.    Review of Systems   Constitutional: Positive for appetite change. Negative for activity change, unexpected weight gain and unexpected weight loss.   Cardiovascular: Positive for palpitations. Negative for chest pain.   Gastrointestinal: Positive for GERD. Negative for blood in stool, constipation, diarrhea, nausea and vomiting.       Objective    Vitals:    11/07/18 1254   BP: 142/74   BP Location: Left arm   Pulse: 68   Temp: 98.6 °F (37 °C)   TempSrc: Temporal Artery    SpO2: 99%   Weight: 52.8 kg (116 lb 6.4 oz)   Height: 163.1 cm (64.2\")     Physical Exam   Constitutional: She is oriented to person, place, and time. She appears well-developed and well-nourished. No distress.   HENT:   Head: Normocephalic and atraumatic.   Eyes: Pupils are equal, round, and reactive to light. EOM are normal.   Neck: Normal range of motion. Neck supple.   Cardiovascular: Normal rate and regular rhythm.    Pulmonary/Chest: Effort normal and breath sounds normal.   Abdominal: Soft. Bowel sounds are normal. She exhibits no distension and no mass. There is tenderness (over epigastrum). There is no rebound and no guarding. No hernia. "   Musculoskeletal: She exhibits no edema.   Neurological: She is alert and oriented to person, place, and time. No cranial nerve deficit.   Skin: Skin is warm and dry. No rash noted. She is not diaphoretic.   Psychiatric: She has a normal mood and affect. Her behavior is normal. Judgment and thought content normal.         Assessment/Plan   Joselyn was seen today for abdominal pain and hypertension.    Diagnoses and all orders for this visit:    Other acute gastritis without hemorrhage - better w/ restarting omeprazole. Will have her continue for 6 weeks or longer if needed.Reflux precautions reviewed    -     omeprazole (priLOSEC) 20 MG capsule; Take 1 capsule by mouth Daily.    SVT (supraventricular tachycardia) (CMS/HCC). HR and home BP readings look better  -     metoprolol succinate XL (TOPROL XL) 25 MG 24 hr tablet; Take 1 tablet by mouth Daily.

## 2018-12-04 ENCOUNTER — TELEPHONE (OUTPATIENT)
Dept: INTERNAL MEDICINE | Facility: CLINIC | Age: 78
End: 2018-12-04

## 2018-12-04 DIAGNOSIS — E78.00 PURE HYPERCHOLESTEROLEMIA: Primary | ICD-10-CM

## 2018-12-04 DIAGNOSIS — E55.9 VITAMIN D DEFICIENCY: ICD-10-CM

## 2018-12-04 DIAGNOSIS — I47.1 SVT (SUPRAVENTRICULAR TACHYCARDIA) (HCC): ICD-10-CM

## 2018-12-05 ENCOUNTER — LAB (OUTPATIENT)
Dept: INTERNAL MEDICINE | Facility: CLINIC | Age: 78
End: 2018-12-05

## 2018-12-05 DIAGNOSIS — E55.9 VITAMIN D DEFICIENCY: ICD-10-CM

## 2018-12-05 DIAGNOSIS — E78.00 PURE HYPERCHOLESTEROLEMIA: ICD-10-CM

## 2018-12-05 DIAGNOSIS — I47.1 SVT (SUPRAVENTRICULAR TACHYCARDIA) (HCC): ICD-10-CM

## 2018-12-05 LAB
25(OH)D3+25(OH)D2 SERPL-MCNC: 50.4 NG/ML
ALBUMIN SERPL-MCNC: 4.46 G/DL (ref 3.2–4.8)
ALBUMIN/GLOB SERPL: 2.4 G/DL (ref 1.5–2.5)
ALP SERPL-CCNC: 72 U/L (ref 25–100)
ALT SERPL-CCNC: 18 U/L (ref 7–40)
AST SERPL-CCNC: 22 U/L (ref 0–33)
BILIRUB SERPL-MCNC: 0.8 MG/DL (ref 0.3–1.2)
BUN SERPL-MCNC: 19 MG/DL (ref 9–23)
BUN/CREAT SERPL: 20.4 (ref 7–25)
CALCIUM SERPL-MCNC: 9.6 MG/DL (ref 8.7–10.4)
CHLORIDE SERPL-SCNC: 105 MMOL/L (ref 99–109)
CHOLEST SERPL-MCNC: 160 MG/DL (ref 0–200)
CO2 SERPL-SCNC: 29 MMOL/L (ref 20–31)
CREAT SERPL-MCNC: 0.93 MG/DL (ref 0.6–1.3)
ERYTHROCYTE [DISTWIDTH] IN BLOOD BY AUTOMATED COUNT: 12.7 % (ref 11.3–14.5)
GLOBULIN SER CALC-MCNC: 1.8 GM/DL
GLUCOSE SERPL-MCNC: 93 MG/DL (ref 70–100)
HCT VFR BLD AUTO: 43 % (ref 34.5–44)
HDLC SERPL-MCNC: 62 MG/DL (ref 40–60)
HGB BLD-MCNC: 13.5 G/DL (ref 11.5–15.5)
LDLC SERPL CALC-MCNC: 85 MG/DL (ref 0–100)
MCH RBC QN AUTO: 28.6 PG (ref 27–31)
MCHC RBC AUTO-ENTMCNC: 31.4 G/DL (ref 32–36)
MCV RBC AUTO: 91.1 FL (ref 80–99)
PLATELET # BLD AUTO: 208 10*3/MM3 (ref 150–450)
POTASSIUM SERPL-SCNC: 4.8 MMOL/L (ref 3.5–5.5)
PROT SERPL-MCNC: 6.3 G/DL (ref 5.7–8.2)
RBC # BLD AUTO: 4.72 10*6/MM3 (ref 3.89–5.14)
SODIUM SERPL-SCNC: 142 MMOL/L (ref 132–146)
TRIGL SERPL-MCNC: 63 MG/DL (ref 0–150)
TSH SERPL DL<=0.005 MIU/L-ACNC: 1.5 MIU/ML (ref 0.35–5.35)
VLDLC SERPL CALC-MCNC: 12.6 MG/DL
WBC # BLD AUTO: 4.59 10*3/MM3 (ref 3.5–10.8)

## 2018-12-09 PROBLEM — I10 ESSENTIAL HYPERTENSION: Status: ACTIVE | Noted: 2018-12-09

## 2018-12-09 NOTE — PROGRESS NOTES
History of Present Illness     Here for medicare wellness exam and physical    Living will- pt has  Exercise - regularly. Walks, tennis, trampoline  Diet - fairly healthy. takes biotin, fish oil, ca, coq10  Falls-one fall recently where she was wearing socks and slipped on hardwood floor, but generally balance is good  Depression - phq-2 - 0  Memory - name recall a little difficult.   specialists: GI - [facility]  -derm-  Dr Sandra  foot - Dr zavala. Eye - [facility]    Insomnia - she is taking 1/2-1 ambien every night, and doing pretty well overall w/ her sleep. She does tolerate it ok.. Does have decaf tea in the day- 2-3. no ETOH generally except occasionally a beer after she plays golf    HTN - she is taking metoprolol 25 1 qd and BPs have been good, even a little low sometimes. Still does feel irregular heart beat nearly daily.     Gastritis/GERD - she has been taking prilosec 20  every morning and does feel better - no longer has the epigastric pain. Does still have some breakthrough reflux but not as bad and is trying to watch diet. She had EGD in '15 that showed gastritis and HH, no H pylo (Dr Sparks)    The following portions of the patient's history were reviewed and updated as appropriate: allergies, current medications, past family history, past medical history, past social history, past surgical history and problem list.    Review of Systems   Constitutional: Negative for fatigue and fever. No weight change   HENT: Negative for congestion and sore throat.    Eyes: Negative for visual disturbance.   Respiratory: Negative for cough and shortness of breath.    Cardiovascular: Negative for chest pain, + palpitations about the same over the last several months  Gastrointestinal: Negative for abdominal distention, abdominal pain, blood in stool, +constipation , +gerd  Genitourinary: Negative for difficulty urinating, dysuria and frequency.   Musculoskeletal: Negative for arthralgias. knee doing well  Skin: Negative  for rash.   Allergic/Immunologic: Negative for immunocompromised state.   Neurological: Negative for dizziness and headaches.   Psychiatric/Behavioral: Negative for dysphoric mood and sleep disturbance.       Objective    Vitals:    12/10/18 1026   BP: 124/82   Temp: 99.6 °F (37.6 °C)     Physical Exam   Constitutional: She is oriented to person, place, and time. She appears well-developed and well-nourished. No distress.   HENT:   Head: Normocephalic and atraumatic.   Right Ear: External ear normal.   Left Ear: External ear normal.   Mouth/Throat: Oropharynx is clear and moist. No oropharyngeal exudate.   Eyes: Conjunctivae and EOM are normal. Pupils are equal, round, and reactive to light.   Neck: Normal range of motion. Neck supple. No thyromegaly present.   Cardiovascular: Normal rate, regular rhythm, normal heart sounds and intact distal pulses.  Exam reveals no gallop and no friction rub.    No murmur heard.  Pulmonary/Chest: Effort normal and breath sounds normal. No respiratory distress. She has no wheezes. She has no rales.   Abdominal: Soft. Bowel sounds are normal. She exhibits no mass. There is no tenderness. There is no rebound and no guarding.   Musculoskeletal: Normal range of motion. She exhibits no edema or deformity.   Lymphadenopathy:     She has no cervical adenopathy.   Neurological: She is alert and oriented to person, place, and time. No cranial nerve deficit. She exhibits normal muscle tone. Coordination normal.   Skin: Skin is warm and dry. She is not diaphoretic.   Psychiatric: She has a normal mood and affect. Her behavior is normal. Judgment and thought content normal.     Labs reviewed    Assessment/Plan   Joselyn was seen today for medicare wellness-subsequent and annual exam.    Diagnoses and all orders for this visit:    Encounter for Medicare annual wellness exam/Routine general medical examination at a health care facility  -     POC Urinalysis Dipstick, Automated  Regular  exercise/healthy diet. BSE q month. Sunscreen use encouraged. caclium intake reviewed.  Living will - pt has  Madhuri due 6/19  Colon due 9/22  Pneumovax - had  prevnar- had  Flu vaccine - had  Had hep A already  DT due now - check at pharmacy  DEXA due now  Shingles - shingrix discussed -  can check at pharmacy since we do not have   Does not need paps since age 65 or older and has had normal paps in past      Pure hypercholesterolemia - FLP looks good    SVT (supraventricular tachycardia) (CMS/HCC)    Gastroesophageal reflux disease without esophagitis  Comments:  will have her raise the prilosec to 40mg - she will double up and see how she does. if no better, we ccan get another EGD    Primary insomnia - on ambien and stable. Pt has already signed controlled substance contract. Evans done today and appropriate. She does not need refill today but will call when she does. She will be in [place] from 1/19-4/19. She will come back in 4/19    Essential hypertension - good control    Postmenopausal  -     DEXA Bone Density Axial; Future    Microscopic hematuria  -     Urinalysis With Microscopic - Urine, Clean Catch  -     Urine Culture - Urine, Urine, Clean Catch

## 2018-12-10 ENCOUNTER — OFFICE VISIT (OUTPATIENT)
Dept: INTERNAL MEDICINE | Facility: CLINIC | Age: 78
End: 2018-12-10

## 2018-12-10 VITALS
BODY MASS INDEX: 19.87 KG/M2 | TEMPERATURE: 99.6 F | SYSTOLIC BLOOD PRESSURE: 124 MMHG | WEIGHT: 116.4 LBS | HEIGHT: 64 IN | DIASTOLIC BLOOD PRESSURE: 82 MMHG

## 2018-12-10 DIAGNOSIS — I10 ESSENTIAL HYPERTENSION: ICD-10-CM

## 2018-12-10 DIAGNOSIS — K21.9 GASTROESOPHAGEAL REFLUX DISEASE WITHOUT ESOPHAGITIS: ICD-10-CM

## 2018-12-10 DIAGNOSIS — F51.01 PRIMARY INSOMNIA: ICD-10-CM

## 2018-12-10 DIAGNOSIS — Z78.0 POSTMENOPAUSAL: ICD-10-CM

## 2018-12-10 DIAGNOSIS — I47.1 SVT (SUPRAVENTRICULAR TACHYCARDIA) (HCC): ICD-10-CM

## 2018-12-10 DIAGNOSIS — E78.00 PURE HYPERCHOLESTEROLEMIA: ICD-10-CM

## 2018-12-10 DIAGNOSIS — Z00.00 ENCOUNTER FOR MEDICARE ANNUAL WELLNESS EXAM: Primary | ICD-10-CM

## 2018-12-10 DIAGNOSIS — Z00.00 ROUTINE GENERAL MEDICAL EXAMINATION AT A HEALTH CARE FACILITY: ICD-10-CM

## 2018-12-10 DIAGNOSIS — R31.29 MICROSCOPIC HEMATURIA: ICD-10-CM

## 2018-12-10 LAB
BILIRUB BLD-MCNC: NEGATIVE MG/DL
CLARITY, POC: CLEAR
COLOR UR: YELLOW
GLUCOSE UR STRIP-MCNC: NEGATIVE MG/DL
KETONES UR QL: NEGATIVE
LEUKOCYTE EST, POC: NEGATIVE
NITRITE UR-MCNC: NEGATIVE MG/ML
PH UR: 5.5 [PH] (ref 5–8)
PROT UR STRIP-MCNC: NEGATIVE MG/DL
RBC # UR STRIP: ABNORMAL /UL
SP GR UR: 1.02 (ref 1–1.03)
UROBILINOGEN UR QL: NORMAL

## 2018-12-10 PROCEDURE — 81003 URINALYSIS AUTO W/O SCOPE: CPT | Performed by: INTERNAL MEDICINE

## 2018-12-10 PROCEDURE — 96160 PT-FOCUSED HLTH RISK ASSMT: CPT | Performed by: INTERNAL MEDICINE

## 2018-12-10 PROCEDURE — G0439 PPPS, SUBSEQ VISIT: HCPCS | Performed by: INTERNAL MEDICINE

## 2018-12-10 PROCEDURE — 99397 PER PM REEVAL EST PAT 65+ YR: CPT | Performed by: INTERNAL MEDICINE

## 2018-12-12 LAB
APPEARANCE UR: CLEAR
BACTERIA #/AREA URNS HPF: ABNORMAL /[HPF]
BACTERIA UR CULT: NO GROWTH
BACTERIA UR CULT: NORMAL
BILIRUB UR QL STRIP: NEGATIVE
CASTS URNS MICRO: ABNORMAL
CASTS URNS QL MICRO: PRESENT /LPF
COLOR UR: YELLOW
EPI CELLS #/AREA URNS HPF: ABNORMAL /HPF
GLUCOSE UR QL: NEGATIVE
HGB UR QL STRIP: (no result)
KETONES UR QL STRIP: NEGATIVE
LEUKOCYTE ESTERASE UR QL STRIP: NEGATIVE
MICRO URNS: (no result)
MUCOUS THREADS URNS QL MICRO: PRESENT
NITRITE UR QL STRIP: NEGATIVE
PH UR STRIP: 5 [PH] (ref 5–7.5)
PROT UR QL STRIP: NEGATIVE
RBC #/AREA URNS HPF: ABNORMAL /HPF
SP GR UR: 1.02 (ref 1–1.03)
UROBILINOGEN UR STRIP-MCNC: 0.2 MG/DL (ref 0.2–1)
WBC #/AREA URNS HPF: ABNORMAL /HPF

## 2019-01-26 DIAGNOSIS — F51.01 PRIMARY INSOMNIA: ICD-10-CM

## 2019-01-28 RX ORDER — ZOLPIDEM TARTRATE 10 MG/1
TABLET ORAL
Qty: 30 TABLET | Refills: 0 | OUTPATIENT
Start: 2019-01-28

## 2019-01-28 RX ORDER — ZOLPIDEM TARTRATE 5 MG/1
5 TABLET ORAL NIGHTLY PRN
Qty: 30 TABLET | Refills: 2 | Status: SHIPPED | OUTPATIENT
Start: 2019-01-28 | End: 2019-03-06

## 2019-01-28 NOTE — TELEPHONE ENCOUNTER
I sent in the 5mg instead, because we got letter from insurance saying 10 is not covered. Not sure if they will cover 5mg but will try. I notified pt

## 2019-03-06 RX ORDER — TRAZODONE HYDROCHLORIDE 50 MG/1
TABLET ORAL
Qty: 60 TABLET | Refills: 0 | Status: SHIPPED | OUTPATIENT
Start: 2019-03-06 | End: 2019-04-10

## 2019-04-10 DIAGNOSIS — F51.01 PRIMARY INSOMNIA: Primary | ICD-10-CM

## 2019-04-11 ENCOUNTER — TELEPHONE (OUTPATIENT)
Dept: INTERNAL MEDICINE | Facility: CLINIC | Age: 79
End: 2019-04-11

## 2019-04-12 DIAGNOSIS — Z78.0 POSTMENOPAUSAL: Primary | ICD-10-CM

## 2019-04-21 NOTE — PROGRESS NOTES
"Subjective   Joselyn Segura is a 78 y.o. female.     History of Present Illness     Here for f/u on:    Insomnia-her insurance would no longer cover Ambien so we tried trazodone, but this made her feel agitated so she had to stop.  We then tried Belsomra about 2 weeks ago, but this also did not work well.  She had poor sleep with it.  For years ago she had been on temazepam-did not help her fall asleep very easily    GERD-at last visit 4 months ago, we raised her Prilosec to 40 mg. This is better, and she is not needing every day.  She tries to watch her diet and avoid heavy foods    R shoulder pain over the last 3 weeks -no injury.  Hurts to reach out or back. She is left handed so has been able to still play tennis.  She had L shoulder problems and did PT in the past which really helped.    The following portions of the patient's history were reviewed and updated as appropriate: allergies, current medications, past family history, past medical history, past social history, past surgical history and problem list.    Review of Systems   Constitutional: Negative for activity change, fever, unexpected weight gain and unexpected weight loss.   Respiratory: Negative for shortness of breath.    Cardiovascular: Positive for palpitations (occasionally). Negative for chest pain.   Gastrointestinal: Positive for GERD. Negative for abdominal pain.   Musculoskeletal: Positive for arthralgias (r shoulder).   Skin: Negative.    Allergic/Immunologic: Negative for immunocompromised state.   Neurological: Negative for seizures, memory problem and confusion.   Psychiatric/Behavioral: Positive for sleep disturbance. Negative for agitation.       Objective   /68 (BP Location: Right arm)   Temp 98.5 °F (36.9 °C) (Temporal)   Ht 162.6 cm (64\")   Wt 51.6 kg (113 lb 12.8 oz)   BMI 19.53 kg/m²   Physical Exam   Constitutional: She is oriented to person, place, and time. She appears well-developed and well-nourished. No distress. "   HENT:   Head: Normocephalic and atraumatic.   Eyes: Conjunctivae and EOM are normal. Pupils are equal, round, and reactive to light. Right eye exhibits no discharge. Left eye exhibits no discharge.   Neck: Normal range of motion. Neck supple.   Cardiovascular: Normal rate and regular rhythm.   Pulmonary/Chest: Effort normal and breath sounds normal.   Musculoskeletal: She exhibits tenderness. She exhibits no edema.   Right shoulder-tender anteriorly.  Decreased active range of motion with internal and external rotation.  Slight crepitus   Neurological: She is alert and oriented to person, place, and time. No cranial nerve deficit.   Skin: Skin is warm and dry. No rash noted. She is not diaphoretic.   Psychiatric: She has a normal mood and affect. Her behavior is normal. Judgment and thought content normal.   Nursing note and vitals reviewed.        Assessment/Plan   Joselyn was seen today for insomnia.    Diagnoses and all orders for this visit:    Primary insomnia -she has tried and failed trazodone and Belsomra.  We will see if we can get Ambien approved since this has worked well and she has tolerated. Pt has already signed controlled substance contract (updated today). Evans done today and appropriate.  I will also go ahead and send in hydroxyzine to try at night in case the Ambien is not approved or if it takes a few days to get Ambien approved  -     zolpidem (AMBIEN) 10 MG tablet; Take 1 tablet by mouth At Night As Needed for Sleep. Pt tried and failed belsomra and trazodone  -     hydrOXYzine (ATARAX) 25 MG tablet; 1 qhs prn insomnia    Pure hypercholesterolemia -we will recheck in about 3 months.    Gastroesophageal reflux disease, esophagitis presence not specified -improved, using Prilosec as needed    SVT (supraventricular tachycardia) (CMS/HCC) -fair control with metoprolol  -     metoprolol succinate XL (TOPROL XL) 25 MG 24 hr tablet; Take 1 tablet by mouth Daily.    R shoulder pain - rotator cuff  injury-okay to use Aleve occasionally.  Would not use every day with her history of GI problems.  I gave her order for PT.  If this does not help over the next month she will call back and we can have her see Ortho

## 2019-04-22 ENCOUNTER — OFFICE VISIT (OUTPATIENT)
Dept: INTERNAL MEDICINE | Facility: CLINIC | Age: 79
End: 2019-04-22

## 2019-04-22 ENCOUNTER — TELEPHONE (OUTPATIENT)
Dept: INTERNAL MEDICINE | Facility: CLINIC | Age: 79
End: 2019-04-22

## 2019-04-22 VITALS
WEIGHT: 113.8 LBS | DIASTOLIC BLOOD PRESSURE: 68 MMHG | BODY MASS INDEX: 19.43 KG/M2 | SYSTOLIC BLOOD PRESSURE: 122 MMHG | TEMPERATURE: 98.5 F | HEIGHT: 64 IN

## 2019-04-22 DIAGNOSIS — M25.511 ACUTE PAIN OF RIGHT SHOULDER: ICD-10-CM

## 2019-04-22 DIAGNOSIS — F51.01 PRIMARY INSOMNIA: Primary | ICD-10-CM

## 2019-04-22 DIAGNOSIS — K21.9 GASTROESOPHAGEAL REFLUX DISEASE, ESOPHAGITIS PRESENCE NOT SPECIFIED: ICD-10-CM

## 2019-04-22 DIAGNOSIS — E78.00 PURE HYPERCHOLESTEROLEMIA: ICD-10-CM

## 2019-04-22 DIAGNOSIS — I47.1 SVT (SUPRAVENTRICULAR TACHYCARDIA) (HCC): ICD-10-CM

## 2019-04-22 PROCEDURE — 99214 OFFICE O/P EST MOD 30 MIN: CPT | Performed by: INTERNAL MEDICINE

## 2019-04-22 RX ORDER — ZOLPIDEM TARTRATE 10 MG/1
10 TABLET ORAL NIGHTLY PRN
Qty: 30 TABLET | Refills: 2 | Status: SHIPPED | OUTPATIENT
Start: 2019-04-22 | End: 2019-07-31 | Stop reason: SDUPTHER

## 2019-04-22 RX ORDER — HYDROXYZINE HYDROCHLORIDE 25 MG/1
TABLET, FILM COATED ORAL
Qty: 30 TABLET | Refills: 5 | Status: SHIPPED | OUTPATIENT
Start: 2019-04-22 | End: 2019-07-31

## 2019-04-22 RX ORDER — METOPROLOL SUCCINATE 25 MG/1
25 TABLET, EXTENDED RELEASE ORAL DAILY
Qty: 30 TABLET | Refills: 11 | Status: SHIPPED | OUTPATIENT
Start: 2019-04-22 | End: 2019-12-20

## 2019-04-22 NOTE — TELEPHONE ENCOUNTER
----- Message from Nohelia Barriga MD sent at 4/22/2019 11:26 AM EDT -----  Regarding: ambien  Can we try to get ambien approved? She tried trazodone and belsomra and these did not work

## 2019-04-24 ENCOUNTER — TELEPHONE (OUTPATIENT)
Dept: INTERNAL MEDICINE | Facility: CLINIC | Age: 79
End: 2019-04-24

## 2019-04-24 NOTE — TELEPHONE ENCOUNTER
YOMAIRA at pharmacy letting them know the ambien has been approved.  PN of the approval.  Approved until 4/22/2021.

## 2019-05-16 ENCOUNTER — HOSPITAL ENCOUNTER (OUTPATIENT)
Dept: BONE DENSITY | Facility: HOSPITAL | Age: 79
Discharge: HOME | End: 2019-05-16
Admitting: INTERNAL MEDICINE

## 2019-05-16 DIAGNOSIS — Z78.0 POSTMENOPAUSAL: ICD-10-CM

## 2019-05-16 PROCEDURE — 77080 DXA BONE DENSITY AXIAL: CPT

## 2019-06-26 DIAGNOSIS — E78.00 PURE HYPERCHOLESTEROLEMIA: Primary | ICD-10-CM

## 2019-06-27 ENCOUNTER — TELEPHONE (OUTPATIENT)
Dept: INTERNAL MEDICINE | Facility: CLINIC | Age: 79
End: 2019-06-27

## 2019-07-03 ENCOUNTER — TELEPHONE (OUTPATIENT)
Dept: INTERNAL MEDICINE | Facility: CLINIC | Age: 79
End: 2019-07-03

## 2019-07-03 NOTE — TELEPHONE ENCOUNTER
Pharmacy called for a refill on her atorvastatin.  She has not had her fasting labs. I let them know it was okay to fill for 30 days.

## 2019-07-29 ENCOUNTER — LAB (OUTPATIENT)
Dept: INTERNAL MEDICINE | Facility: CLINIC | Age: 79
End: 2019-07-29

## 2019-07-29 DIAGNOSIS — E78.00 PURE HYPERCHOLESTEROLEMIA: ICD-10-CM

## 2019-07-30 LAB
ALBUMIN SERPL-MCNC: 4.7 G/DL (ref 3.5–5.2)
ALBUMIN/GLOB SERPL: 2.1 G/DL
ALP SERPL-CCNC: 67 U/L (ref 39–117)
ALT SERPL-CCNC: 12 U/L (ref 1–33)
AST SERPL-CCNC: 17 U/L (ref 1–32)
BILIRUB SERPL-MCNC: 0.7 MG/DL (ref 0.2–1.2)
BUN SERPL-MCNC: 20 MG/DL (ref 8–23)
BUN/CREAT SERPL: 22 (ref 7–25)
CALCIUM SERPL-MCNC: 9.7 MG/DL (ref 8.6–10.5)
CHLORIDE SERPL-SCNC: 105 MMOL/L (ref 98–107)
CHOLEST SERPL-MCNC: 165 MG/DL (ref 0–200)
CO2 SERPL-SCNC: 28.6 MMOL/L (ref 22–29)
CREAT SERPL-MCNC: 0.91 MG/DL (ref 0.57–1)
ERYTHROCYTE [DISTWIDTH] IN BLOOD BY AUTOMATED COUNT: 12.7 % (ref 12.3–15.4)
GLOBULIN SER CALC-MCNC: 2.2 GM/DL
GLUCOSE SERPL-MCNC: 99 MG/DL (ref 65–99)
HCT VFR BLD AUTO: 45.4 % (ref 34–46.6)
HDLC SERPL-MCNC: 71 MG/DL (ref 40–60)
HGB BLD-MCNC: 13.7 G/DL (ref 12–15.9)
LDLC SERPL CALC-MCNC: 80 MG/DL (ref 0–100)
MCH RBC QN AUTO: 28.7 PG (ref 26.6–33)
MCHC RBC AUTO-ENTMCNC: 30.2 G/DL (ref 31.5–35.7)
MCV RBC AUTO: 95.2 FL (ref 79–97)
PLATELET # BLD AUTO: 224 10*3/MM3 (ref 140–450)
POTASSIUM SERPL-SCNC: 4.7 MMOL/L (ref 3.5–5.2)
PROT SERPL-MCNC: 6.9 G/DL (ref 6–8.5)
RBC # BLD AUTO: 4.77 10*6/MM3 (ref 3.77–5.28)
SODIUM SERPL-SCNC: 144 MMOL/L (ref 136–145)
TRIGL SERPL-MCNC: 70 MG/DL (ref 0–150)
VLDLC SERPL CALC-MCNC: 14 MG/DL
WBC # BLD AUTO: 4.21 10*3/MM3 (ref 3.4–10.8)

## 2019-07-31 ENCOUNTER — OFFICE VISIT (OUTPATIENT)
Dept: INTERNAL MEDICINE | Facility: CLINIC | Age: 79
End: 2019-07-31

## 2019-07-31 VITALS
DIASTOLIC BLOOD PRESSURE: 76 MMHG | OXYGEN SATURATION: 97 % | HEIGHT: 64 IN | TEMPERATURE: 99.4 F | SYSTOLIC BLOOD PRESSURE: 122 MMHG | WEIGHT: 113.6 LBS | BODY MASS INDEX: 19.39 KG/M2 | HEART RATE: 61 BPM

## 2019-07-31 DIAGNOSIS — F51.01 PRIMARY INSOMNIA: Primary | ICD-10-CM

## 2019-07-31 DIAGNOSIS — R00.2 PALPITATIONS: ICD-10-CM

## 2019-07-31 DIAGNOSIS — E78.00 PURE HYPERCHOLESTEROLEMIA: ICD-10-CM

## 2019-07-31 DIAGNOSIS — R07.89 CHEST TIGHTNESS: ICD-10-CM

## 2019-07-31 PROCEDURE — 99214 OFFICE O/P EST MOD 30 MIN: CPT | Performed by: INTERNAL MEDICINE

## 2019-07-31 PROCEDURE — 93000 ELECTROCARDIOGRAM COMPLETE: CPT | Performed by: INTERNAL MEDICINE

## 2019-07-31 RX ORDER — ZOLPIDEM TARTRATE 10 MG/1
10 TABLET ORAL NIGHTLY PRN
Qty: 30 TABLET | Refills: 2 | Status: SHIPPED | OUTPATIENT
Start: 2019-07-31 | End: 2019-11-11 | Stop reason: SDUPTHER

## 2019-07-31 NOTE — PROGRESS NOTES
"Subjective   Joselyn Segura is a 78 y.o. female.     History of Present Illness     Here for f/u on:    Insomnia -Ambien works well overall.  Does need a refill.  Insurance would not cover it until she tried several other sleep medications but these did not help so insurance did approve Ambien    Hyperlipidemia -taking Lipitor regularly and cholesterol labs from yesterday look good    palpitations - has been getting a lot of variation in her heart rate, both lows and highs. The lowest was  high 30s once, and the highest will he usually low 100s. She does feel like the heat makes it worse. Has felt more tired recently. She is taking 1/2 dose of metoprolol daily. no definite chest pain but does feel some shortness of breath and more winded when she is exercising.  sHe plays tennis or golf daily    The following portions of the patient's history were reviewed and updated as appropriate: allergies, current medications, past family history, past medical history, past social history, past surgical history and problem list.    Review of Systems   Constitutional: Negative for activity change, appetite change, unexpected weight gain and unexpected weight loss.   Respiratory: Positive for chest tightness and shortness of breath.    Cardiovascular: Positive for palpitations. Negative for chest pain.   Gastrointestinal: Positive for GERD (she is using otc prn and working well, uses prilosec occasionally). Negative for constipation and diarrhea.   Psychiatric/Behavioral: Positive for sleep disturbance.       Objective   /76 (BP Location: Right arm)   Pulse 61   Temp 99.4 °F (37.4 °C) (Temporal)   Ht 162.6 cm (64\")   Wt 51.5 kg (113 lb 9.6 oz)   SpO2 97%   BMI 19.50 kg/m²   Physical Exam   Constitutional: She is oriented to person, place, and time. She appears well-developed and well-nourished. No distress.   HENT:   Head: Normocephalic and atraumatic.   Eyes: Conjunctivae and EOM are normal. Pupils are equal, round, " and reactive to light. Right eye exhibits no discharge. Left eye exhibits no discharge.   Neck: Normal range of motion. Neck supple.   Cardiovascular: Normal rate and regular rhythm.   Pulmonary/Chest: Effort normal and breath sounds normal.   Musculoskeletal: She exhibits no edema.   Neurological: She is alert and oriented to person, place, and time. No cranial nerve deficit.   Skin: Skin is warm and dry. No rash noted. She is not diaphoretic.   Psychiatric: She has a normal mood and affect. Her behavior is normal. Judgment and thought content normal.   Nursing note and vitals reviewed.      ECG 12 Lead  Date/Time: 7/31/2019 2:44 PM  Performed by: Nohelia Barriga MD  Authorized by: Nohelia Barriga MD   Comparison: compared with previous ECG from 5/23/2014  Similar to previous ECG  Rhythm: sinus rhythm  Rate: normal  Conduction: conduction normal  QRS axis: normal    Clinical impression: normal ECG          Labs reviewed    Assessment/Plan   Joselyn was seen today for insomnia and med refill.    Diagnoses and all orders for this visit:    Primary insomnia -stable with Ambien. Pt has already signed controlled substance contract. Evans done today and appropriate.   -     zolpidem (AMBIEN) 10 MG tablet; Take 1 tablet by mouth At Night As Needed for Sleep. Pt tried and failed belsomra and trazodone    Pure hypercholesterolemia -levels look good.  Continue Lipitor.  Recheck in 6 months    Palpitations -history of SVT on previous Holter.  Palpitations have worsened.  We will go ahead and recheck a Holter  -     ECG 12 Lead  -     Holter Monitor - 72 Hour Up To 21 Days; Future    Chest tightness/exercise intolerance-we will go ahead and get a stress test  -     Stress test with myocardial perfusion; Future          Prev - colon '12 - repeat 7-10 yr - she did get notice to schedule but prefers not to at this point since the '12 one was normal.

## 2019-08-06 NOTE — PROGRESS NOTES
"[facility]      Encounter Date:08/07/2019     [place] Segura  [address] [place] [address]  [unfilled]    1940    Nohelia Barriga MD    Joeslyn Segura is a 78 y.o. female.      Subjective:     Chief Complaint:  New patient-palpitations     HPI   Patient is a 78-year-old female with past medical history significant for hyperlipidemia, GERD, SVT and hypertension who [facility] to the [facility] at the request of Dr. Barriga for evaluation of palpitations patient wore a 48-hour Holter monitor back in November 2017 which showed 7 brief runs of SVT, the longest SVT run being 7 beats.  Patient notes that recently she has had a lot of variations of her heart rates ranging from high 40s to low 100s.  She notes BP monitor often reports \"irregular heart beat\". Symptoms exacerbated by heat.  She does note worsening fatigue.  She is taking a half dose of metoprolol succinate daily.  She also notes some worsening exertional dyspnea when she exercises.  She is typically active, plays golf and tennis. Over the past couple of years she questions whether she should continue these sports because she is having more difficulty doing them.  Denies chest pain. Patient scheduled for stress test 8/22    Brother has had 2 ablations of afib    Cardiac risks:  HTN, HLD, Age        Patient Active Problem List   Diagnosis   • Primary insomnia   • Osteopenia   • Pure hypercholesterolemia   • GERD (gastroesophageal reflux disease)   • SVT (supraventricular tachycardia) (CMS/[facility])   • Essential hypertension       Past Medical History:   Diagnosis Date   • Carotid bruit 06/2000    left, US normal, repat duplex 8/13 - mild B 0-29% stenosis   • Cystadenoma    • Endometriosis     with subsequent surgery   • Esophagitis 05/2015    EGD - gastritis, HH   • Gastritis    • H/O bone density study 04/2016 6/2010, 8/13 - worsening osteopenia; 4/16 stahble osteopenia, recheck in 2 years    • H/O " colonoscopy 2012 normal; 2012: normal ([facility]) repeat 10 years   • H/O mammogram 2018    [facility]   • Insomnia    • Optic neuritis 2000   • Palpitations 2012    normal stress echo EKG T inv V2V3   • Pap smear for cervical cancer screening 2012    [facility] 6/28/10 normal   • Pelvic mass    • SVT (supraventricular tachycardia) (CMS/HCC) 2017    on holter       Past Surgical History:   Procedure Laterality Date   • BASAL CELL CARCINOMA EXCISION  2014    mohs surgery, Dr. Rm, Dr. Richard at    • ENDOSCOPY     • KNEE ACL RECONSTRUCTION Left     injury with subsequent surgery   • KNEE ARTHROSCOPY Right 05/10/2018    Dr. Sachin Merino   • TOTAL ABDOMINAL HYSTERECTOMY WITH SALPINGO OOPHORECTOMY  10/2010    large pelvic mass - s/p excision, Dr. Goodman - cystadenoma L ovary, leiomyoma (benign)       Family History   Problem Relation Age of Onset   • Alzheimer's disease Mother         80's   • Heart disease Mother          age 86   • Hyperlipidemia Mother    • Atrial fibrillation Mother    • Liver cancer Father    • Hypertension Sister    • Ulcers Sister    • No Known Problems Maternal Grandmother    • No Known Problems Maternal Grandfather    • No Known Problems Paternal Grandmother    • No Known Problems Paternal Grandfather    • Atrial fibrillation Brother        Social History     Socioeconomic History   • Marital status:      Spouse name: Not on file   • Number of children: Not on file   • Years of education: Not on file   • Highest education level: Not on file   Tobacco Use   • Smoking status: Former Smoker     Packs/day: 0.30     Years: 16.00     Pack years: 4.80     Start date: 1958     Last attempt to quit: 1974     Years since quittin.2   • Smokeless tobacco: Never Used   • Tobacco comment: 1-2 cigs a day.   Substance and Sexual Activity   • Alcohol use: Yes     Alcohol/week: 0.6 oz     Types: 1 Cans of beer per week     Frequency: 2-3 times a week      Comment: a week   • Drug use: No   Social History Narrative    Caffeine: 2 glasses of iced tea daily       Allergies   Allergen Reactions   • Ace Inhibitors Cough   • Lisinopril Cough   • Sulfadiazine Other (See Comments)     Mother told her she was allergic as a child.   • Trazodone Irritability   • Premarin [Conjugated Estrogens] Other (See Comments)     Not sure   • Sulfa Antibiotics Rash         Current Outpatient Medications:   •  aspirin 81 MG EC tablet, Take 243 mg by mouth Daily., Disp: , Rfl:   •  atorvastatin (LIPITOR) 10 MG tablet, TAKE ONE TABLET BY MOUTH DAILY, Disp: 90 tablet, Rfl: 1  •  metoprolol succinate XL (TOPROL XL) 25 MG 24 hr tablet, Take 1 tablet by mouth Daily., Disp: 30 tablet, Rfl: 11  •  omeprazole (priLOSEC) 20 MG capsule, Take 1 capsule by mouth Daily., Disp: 30 capsule, Rfl: 2  •  zolpidem (AMBIEN) 10 MG tablet, Take 1 tablet by mouth At Night As Needed for Sleep. Pt tried and failed belsomra and trazodone, Disp: 30 tablet, Rfl: 2    The following portions of the patient's history were reviewed today and updated as appropriate: allergies, current medications, past family history, past medical history, past social history, past surgical history and problem list     Review of Systems   Constitution: Positive for malaise/fatigue. Negative for chills and fever.   HENT: Negative.    Eyes: Negative.    Cardiovascular: Positive for irregular heartbeat and palpitations. Negative for chest pain, claudication, cyanosis, dyspnea on exertion, leg swelling, near-syncope, orthopnea, paroxysmal nocturnal dyspnea and syncope.   Respiratory: Negative for cough, shortness of breath and snoring.    Endocrine: Positive for cold intolerance and heat intolerance.   Hematologic/Lymphatic: Does not bruise/bleed easily.   Skin: Negative for poor wound healing.   Musculoskeletal: Negative.    Gastrointestinal: Positive for heartburn. Negative for abdominal pain, hematemesis, melena, nausea and vomiting.  "  Genitourinary: Negative.  Negative for hematuria.   Psychiatric/Behavioral: Negative.    Allergic/Immunologic: Negative.        Objective:     Vitals:    08/07/19 1014 08/07/19 1015 08/07/19 1016   BP: 143/67 142/67 136/63   BP Location: Right arm Left arm Left arm   Patient Position: Sitting Sitting Standing   Cuff Size: Adult Adult Adult   Pulse: 65 63 68   Resp: 18     Temp: 97 °F (36.1 °C)     TempSrc: Temporal     SpO2: 95% 97% 97%   Weight: 51.9 kg (114 lb 6 oz)     Height: 162.6 cm (64\")         Physical Exam   Constitutional: She is oriented to person, place, and time. She appears well-developed and well-nourished. No distress.   HENT:   Head: Normocephalic.   Eyes: Conjunctivae are normal. Pupils are equal, round, and reactive to light.   Neck: Neck supple. No JVD present. No thyromegaly present.   Cardiovascular: Normal rate, regular rhythm, normal heart sounds and intact distal pulses. Exam reveals no gallop and no friction rub.   No murmur heard.  Pulmonary/Chest: Effort normal and breath sounds normal. No respiratory distress. She has no wheezes. She has no rales. She exhibits no tenderness.   Abdominal: Soft. Bowel sounds are normal.   Musculoskeletal: Normal range of motion. She exhibits no edema.   Neurological: She is alert and oriented to person, place, and time.   Skin: Skin is warm and dry.   Psychiatric: She has a normal mood and affect. Her behavior is normal. Thought content normal.   Vitals reviewed.      Lab and Diagnostic Review:  Results for orders placed or performed in visit on 07/29/19   CBC (No Diff)   Result Value Ref Range    WBC 4.21 3.40 - 10.80 10*3/mm3    RBC 4.77 3.77 - 5.28 10*6/mm3    Hemoglobin 13.7 12.0 - 15.9 g/dL    Hematocrit 45.4 34.0 - 46.6 %    MCV 95.2 79.0 - 97.0 fL    MCH 28.7 26.6 - 33.0 pg    MCHC 30.2 (L) 31.5 - 35.7 g/dL    RDW 12.7 12.3 - 15.4 %    Platelets 224 140 - 450 10*3/mm3   Lipid Panel   Result Value Ref Range    Total Cholesterol 165 0 - 200 mg/dL "    Triglycerides 70 0 - 150 mg/dL    HDL Cholesterol 71 (H) 40 - 60 mg/dL    VLDL Cholesterol 14 mg/dL    LDL Cholesterol  80 0 - 100 mg/dL   Comprehensive Metabolic Panel   Result Value Ref Range    Glucose 99 65 - 99 mg/dL    BUN 20 8 - 23 mg/dL    Creatinine 0.91 0.57 - 1.00 mg/dL    eGFR Non African Am 60 (L) >60 mL/min/1.73    eGFR African Am 72 >60 mL/min/1.73    BUN/Creatinine Ratio 22.0 7.0 - 25.0    Sodium 144 136 - 145 mmol/L    Potassium 4.7 3.5 - 5.2 mmol/L    Chloride 105 98 - 107 mmol/L    Total CO2 28.6 22.0 - 29.0 mmol/L    Calcium 9.7 8.6 - 10.5 mg/dL    Total Protein 6.9 6.0 - 8.5 g/dL    Albumin 4.70 3.50 - 5.20 g/dL    Globulin 2.2 gm/dL    A/G Ratio 2.1 g/dL    Total Bilirubin 0.7 0.2 - 1.2 mg/dL    Alkaline Phosphatase 67 39 - 117 U/L    AST (SGOT) 17 1 - 32 U/L    ALT (SGPT) 12 1 - 33 U/L     EKG 7/31/19 NSR, possible right ventricular conduction delay    Assessment and Plan:   1. Palpitations  Symptoms concerning for possible afib  - Adult Transthoracic Echo Complete W/ Cont if Necessary Per Protocol; Future  - Mobile Cardiac Outpatient Telemetry; Future    2. SVT (supraventricular tachycardia) (CMS/HCC)  Brief episodes on holter in 2017  - Adult Transthoracic Echo Complete W/ Cont if Necessary Per Protocol; Future    3. Essential hypertension  Controlled  - Adult Transthoracic Echo Complete W/ Cont if Necessary Per Protocol; Future      RV 6 weeks    It has been a pleasure to participate in the care of this patient.  Patient was instructed to call the [facility] with any questions, concerns, or worsening symptoms.    *Please note that portions of this note were completed with a voice recognition program. Efforts were made to edit the dictations, but occasionally words are mistranscribed.

## 2019-08-07 ENCOUNTER — OFFICE VISIT (OUTPATIENT)
Dept: CARDIOLOGY | Facility: HOSPITAL | Age: 79
End: 2019-08-07

## 2019-08-07 ENCOUNTER — HOSPITAL ENCOUNTER (OUTPATIENT)
Dept: CARDIOLOGY | Facility: HOSPITAL | Age: 79
Discharge: HOME | End: 2019-08-07

## 2019-08-07 VITALS
RESPIRATION RATE: 18 BRPM | HEART RATE: 68 BPM | SYSTOLIC BLOOD PRESSURE: 136 MMHG | DIASTOLIC BLOOD PRESSURE: 63 MMHG | TEMPERATURE: 97 F | HEIGHT: 64 IN | BODY MASS INDEX: 19.53 KG/M2 | OXYGEN SATURATION: 97 % | WEIGHT: 114.38 LBS

## 2019-08-07 DIAGNOSIS — I47.1 SVT (SUPRAVENTRICULAR TACHYCARDIA) (HCC): Primary | ICD-10-CM

## 2019-08-07 DIAGNOSIS — R00.2 PALPITATIONS: ICD-10-CM

## 2019-08-07 DIAGNOSIS — I10 ESSENTIAL HYPERTENSION: ICD-10-CM

## 2019-08-07 PROCEDURE — 99214 OFFICE O/P EST MOD 30 MIN: CPT | Performed by: NURSE PRACTITIONER

## 2019-08-07 PROCEDURE — 93270 REMOTE 30 DAY ECG REV/REPORT: CPT | Performed by: INTERNAL MEDICINE

## 2019-08-07 RX ORDER — ASPIRIN 81 MG/1
243 TABLET ORAL DAILY
COMMUNITY

## 2019-08-22 ENCOUNTER — APPOINTMENT (OUTPATIENT)
Dept: CARDIOLOGY | Facility: HOSPITAL | Age: 79
End: 2019-08-22

## 2019-09-05 PROCEDURE — 93272 ECG/REVIEW INTERPRET ONLY: CPT | Performed by: INTERNAL MEDICINE

## 2019-09-06 ENCOUNTER — HOSPITAL ENCOUNTER (OUTPATIENT)
Dept: CARDIOLOGY | Facility: HOSPITAL | Age: 79
Discharge: HOME | End: 2019-09-06

## 2019-09-06 VITALS — BODY MASS INDEX: 19.46 KG/M2 | HEIGHT: 64 IN | WEIGHT: 114 LBS

## 2019-09-06 DIAGNOSIS — I10 ESSENTIAL HYPERTENSION: ICD-10-CM

## 2019-09-06 DIAGNOSIS — I47.1 SVT (SUPRAVENTRICULAR TACHYCARDIA) (HCC): ICD-10-CM

## 2019-09-06 DIAGNOSIS — R07.89 CHEST TIGHTNESS: ICD-10-CM

## 2019-09-06 DIAGNOSIS — R00.2 PALPITATIONS: ICD-10-CM

## 2019-09-06 LAB
BH CV STRESS BP STAGE 1: NORMAL
BH CV STRESS BP STAGE 2: NORMAL
BH CV STRESS DURATION MIN STAGE 1: 3
BH CV STRESS DURATION MIN STAGE 2: 3
BH CV STRESS DURATION MIN STAGE 3: 2
BH CV STRESS DURATION SEC STAGE 1: 0
BH CV STRESS DURATION SEC STAGE 2: 0
BH CV STRESS DURATION SEC STAGE 3: 0
BH CV STRESS GRADE STAGE 1: 10
BH CV STRESS GRADE STAGE 2: 12
BH CV STRESS GRADE STAGE 3: 14
BH CV STRESS HR STAGE 1: 100
BH CV STRESS HR STAGE 2: 126
BH CV STRESS HR STAGE 3: 130
BH CV STRESS METS STAGE 1: 5
BH CV STRESS METS STAGE 2: 7.5
BH CV STRESS METS STAGE 3: 10
BH CV STRESS O2 STAGE 3: 99
BH CV STRESS PROTOCOL 1: NORMAL
BH CV STRESS RECOVERY BP: NORMAL MMHG
BH CV STRESS RECOVERY HR: 78 BPM
BH CV STRESS RECOVERY O2: 99 %
BH CV STRESS SPEED STAGE 1: 1.7
BH CV STRESS SPEED STAGE 2: 2.5
BH CV STRESS SPEED STAGE 3: 3.4
BH CV STRESS STAGE 1: 1
BH CV STRESS STAGE 2: 2
BH CV STRESS STAGE 3: 3
LV EF NUC BP: 66 %
MAXIMAL PREDICTED HEART RATE: 141 BPM
PERCENT MAX PREDICTED HR: 94.33 %
STRESS BASELINE BP: NORMAL MMHG
STRESS BASELINE HR: 58 BPM
STRESS O2 SAT REST: 99 %
STRESS PERCENT HR: 111 %
STRESS POST ESTIMATED WORKLOAD: 7.9 METS
STRESS POST EXERCISE DUR MIN: 8 MIN
STRESS POST EXERCISE DUR SEC: 0 SEC
STRESS POST O2 SAT PEAK: 99 %
STRESS POST PEAK BP: NORMAL MMHG
STRESS POST PEAK HR: 133 BPM
STRESS TARGET HR: 120 BPM

## 2019-09-06 PROCEDURE — A9500 TC99M SESTAMIBI: HCPCS | Performed by: INTERNAL MEDICINE

## 2019-09-06 PROCEDURE — 0 TECHNETIUM SESTAMIBI: Performed by: INTERNAL MEDICINE

## 2019-09-06 PROCEDURE — 93017 CV STRESS TEST TRACING ONLY: CPT

## 2019-09-06 PROCEDURE — 93306 TTE W/DOPPLER COMPLETE: CPT

## 2019-09-06 PROCEDURE — 78452 HT MUSCLE IMAGE SPECT MULT: CPT | Performed by: INTERNAL MEDICINE

## 2019-09-06 PROCEDURE — 93018 CV STRESS TEST I&R ONLY: CPT | Performed by: INTERNAL MEDICINE

## 2019-09-06 PROCEDURE — 78452 HT MUSCLE IMAGE SPECT MULT: CPT

## 2019-09-06 PROCEDURE — 93306 TTE W/DOPPLER COMPLETE: CPT | Performed by: INTERNAL MEDICINE

## 2019-09-06 RX ADMIN — TECHNETIUM TC 99M SESTAMIBI 1 DOSE: 1 INJECTION INTRAVENOUS at 10:55

## 2019-09-06 RX ADMIN — TECHNETIUM TC 99M SESTAMIBI 1 DOSE: 1 INJECTION INTRAVENOUS at 12:50

## 2019-09-07 LAB
BH CV ECHO MEAS - AO MAX PG (FULL): 2.4 MMHG
BH CV ECHO MEAS - AO MAX PG: 6.7 MMHG
BH CV ECHO MEAS - AO ROOT AREA (BSA CORRECTED): 4.7
BH CV ECHO MEAS - AO ROOT AREA: 7 CM^2
BH CV ECHO MEAS - AO ROOT DIAM: 3 CM
BH CV ECHO MEAS - AO V2 MAX: 129.8 CM/SEC
BH CV ECHO MEAS - AVA(V,A): 2.3 CM^2
BH CV ECHO MEAS - AVA(V,D): 2.3 CM^2
BH CV ECHO MEAS - BSA(HAYCOCK): 0.49 M^2
BH CV ECHO MEAS - BSA: 0.63 M^2
BH CV ECHO MEAS - BZI_BMI: 2.4 KILOGRAMS/M^2
BH CV ECHO MEAS - BZI_METRIC_HEIGHT: 162.6 CM
BH CV ECHO MEAS - BZI_METRIC_WEIGHT: 6.4 KG
BH CV ECHO MEAS - EDV(CUBED): 89.6 ML
BH CV ECHO MEAS - EDV(MOD-SP2): 64 ML
BH CV ECHO MEAS - EDV(MOD-SP4): 64 ML
BH CV ECHO MEAS - EDV(TEICH): 91.2 ML
BH CV ECHO MEAS - EF(CUBED): 77.9 %
BH CV ECHO MEAS - EF(MOD-BP): 70 %
BH CV ECHO MEAS - EF(MOD-SP2): 68.8 %
BH CV ECHO MEAS - EF(MOD-SP4): 70.3 %
BH CV ECHO MEAS - EF(TEICH): 70.2 %
BH CV ECHO MEAS - ESV(CUBED): 19.8 ML
BH CV ECHO MEAS - ESV(MOD-SP2): 20 ML
BH CV ECHO MEAS - ESV(MOD-SP4): 19 ML
BH CV ECHO MEAS - ESV(TEICH): 27.2 ML
BH CV ECHO MEAS - FS: 39.5 %
BH CV ECHO MEAS - IVS/LVPW: 0.98
BH CV ECHO MEAS - IVSD: 0.8 CM
BH CV ECHO MEAS - LA DIMENSION: 3.1 CM
BH CV ECHO MEAS - LA/AO: 1
BH CV ECHO MEAS - LAD MAJOR: 5.5 CM
BH CV ECHO MEAS - LAT PEAK E' VEL: 5.6 CM/SEC
BH CV ECHO MEAS - LATERAL E/E' RATIO: 14.2
BH CV ECHO MEAS - LV DIASTOLIC VOL/BSA (35-75): 101.3 ML/M^2
BH CV ECHO MEAS - LV MASS(C)D: 114.6 GRAMS
BH CV ECHO MEAS - LV MASS(C)DI: 181.5 GRAMS/M^2
BH CV ECHO MEAS - LV MAX PG: 4.4 MMHG
BH CV ECHO MEAS - LV SYSTOLIC VOL/BSA (12-30): 30.1 ML/M^2
BH CV ECHO MEAS - LV V1 MAX: 104.6 CM/SEC
BH CV ECHO MEAS - LVIDD: 4.5 CM
BH CV ECHO MEAS - LVIDS: 2.7 CM
BH CV ECHO MEAS - LVLD AP2: 7.1 CM
BH CV ECHO MEAS - LVLD AP4: 6.7 CM
BH CV ECHO MEAS - LVLS AP2: 6.2 CM
BH CV ECHO MEAS - LVLS AP4: 5.9 CM
BH CV ECHO MEAS - LVOT AREA (M): 2.8 CM^2
BH CV ECHO MEAS - LVOT AREA: 2.9 CM^2
BH CV ECHO MEAS - LVOT DIAM: 1.9 CM
BH CV ECHO MEAS - LVPWD: 0.82 CM
BH CV ECHO MEAS - MED PEAK E' VEL: 7 CM/SEC
BH CV ECHO MEAS - MEDIAL E/E' RATIO: 11.5
BH CV ECHO MEAS - MV A MAX VEL: 100.3 CM/SEC
BH CV ECHO MEAS - MV DEC SLOPE: 311.3 CM/SEC^2
BH CV ECHO MEAS - MV DEC TIME: 0.29 SEC
BH CV ECHO MEAS - MV E MAX VEL: 81.8 CM/SEC
BH CV ECHO MEAS - MV E/A: 0.82
BH CV ECHO MEAS - MV MAX PG: 5.5 MMHG
BH CV ECHO MEAS - MV MEAN PG: 1.8 MMHG
BH CV ECHO MEAS - MV P1/2T MAX VEL: 98 CM/SEC
BH CV ECHO MEAS - MV P1/2T: 92.2 MSEC
BH CV ECHO MEAS - MV V2 MAX: 117.3 CM/SEC
BH CV ECHO MEAS - MV V2 MEAN: 60.6 CM/SEC
BH CV ECHO MEAS - MV V2 VTI: 44.3 CM
BH CV ECHO MEAS - MVA P1/2T LCG: 2.2 CM^2
BH CV ECHO MEAS - MVA(P1/2T): 2.4 CM^2
BH CV ECHO MEAS - PA ACC SLOPE: 690 CM/SEC^2
BH CV ECHO MEAS - PA ACC TIME: 0.11 SEC
BH CV ECHO MEAS - PA PR(ACCEL): 31.5 MMHG
BH CV ECHO MEAS - PULM DIAS VEL: 33.9 CM/SEC
BH CV ECHO MEAS - PULM S/D: 1.5
BH CV ECHO MEAS - PULM SYS VEL: 50.6 CM/SEC
BH CV ECHO MEAS - RAP SYSTOLE: 3 MMHG
BH CV ECHO MEAS - RVSP: 19 MMHG
BH CV ECHO MEAS - SI(CUBED): 110.4 ML/M^2
BH CV ECHO MEAS - SI(MOD-SP2): 69.6 ML/M^2
BH CV ECHO MEAS - SI(MOD-SP4): 71.2 ML/M^2
BH CV ECHO MEAS - SI(TEICH): 101.4 ML/M^2
BH CV ECHO MEAS - SV(CUBED): 69.8 ML
BH CV ECHO MEAS - SV(MOD-SP2): 44 ML
BH CV ECHO MEAS - SV(MOD-SP4): 45 ML
BH CV ECHO MEAS - SV(TEICH): 64 ML
BH CV ECHO MEAS - TAPSE (>1.6): 2.7 CM2
BH CV ECHO MEAS - TR MAX PG: 16 MMHG
BH CV ECHO MEAS - TR MAX VEL: 192 CM/SEC
BH CV ECHO MEASUREMENTS AVERAGE E/E' RATIO: 12.98
BH CV XLRA - RV BASE: 3.1 CM
BH CV XLRA - RV LENGTH: 7.1 CM
BH CV XLRA - RV MID: 2.8 CM
BH CV XLRA - TDI S': 12.6 CM/SEC
LEFT ATRIUM VOLUME INDEX: 74.4 ML/M^2
LEFT ATRIUM VOLUME: 47 ML

## 2019-09-20 NOTE — PROGRESS NOTES
[facility]      Encounter Date:08/07/2019     Joselyn Segura  [address] [facility] [place] [address]  [unfilled]    1940    Nohelia Barriga MD    Joselyn Segura is a 78 y.o. female.      Subjective:     Chief Complaint:  New patient-palpitations     HPI   Patient is a 78-year-old female with past medical history significant for hyperlipidemia, GERD, SVT and hypertension who presents to the [facility] to follow up on palpitations.  Patient wore MCOT 1 4 beat run of ventricular tachycardia occurring in the early morning hours, otherwise no arrhythmias.  Echo showed normal left ventricular systolic function, mild MR with probably prolapse of the anterior mitral leaflet and possible pulmonary AVM.  GXT Cardiolite was negative for ischemia.    She is doing very well. She did a recent golf and tennis tournament in the same day without symptoms. She does note heat tends to aggravate symptoms. Denies shortness of breath      Cardiac risks:  HTN, HLD, Age        Patient Active Problem List   Diagnosis   • Primary insomnia   • Osteopenia   • Pure hypercholesterolemia   • GERD (gastroesophageal reflux disease)   • SVT (supraventricular tachycardia) (CMS/HCC)   • Essential hypertension       Past Medical History:   Diagnosis Date   • Carotid bruit 06/2000    left, US normal, repat duplex 8/13 - mild B 0-29% stenosis   • Cystadenoma    • Endometriosis     with subsequent surgery   • Esophagitis 05/2015    EGD - gastritis, HH   • Gastritis    • H/O bone density study 04/2016 6/2010, 8/13 - worsening osteopenia; 4/16 stahble osteopenia, recheck in 2 years    • H/O colonoscopy 09/2012 4/2002 normal; 2012: normal ([facility]) repeat 10 years   • H/O [place]ogram 06/21/2018    [facility] [place]   • Insomnia    • Optic neuritis 02/2000   • Palpitations 12/2012    normal stress echo EKG T inv V2V3   • Pap smear for cervical cancer screening 08/2012    BMG 6/28/10 normal   • Pelvic mass    • SVT  (supraventricular tachycardia) (CMS/HCC) 2017    on holter       Past Surgical History:   Procedure Laterality Date   • BASAL CELL CARCINOMA EXCISION  2014    mohs surgery, Dr. Rm, Dr. Richard at    • ENDOSCOPY     • KNEE ACL RECONSTRUCTION Left     injury with subsequent surgery   • KNEE ARTHROSCOPY Right 05/10/2018    Dr. Sachin Merino   • TOTAL ABDOMINAL HYSTERECTOMY WITH SALPINGO OOPHORECTOMY  10/2010    large pelvic mass - s/p excision, Dr. Goodman - cystadenoma L ovary, leiomyoma (benign)       Family History   Problem Relation Age of Onset   • Alzheimer's disease Mother         80's   • Heart disease Mother          age 86   • Hyperlipidemia Mother    • Atrial fibrillation Mother    • Liver cancer Father    • Hypertension Sister    • Ulcers Sister    • No Known Problems Maternal Grandmother    • No Known Problems Maternal Grandfather    • No Known Problems Paternal Grandmother    • No Known Problems Paternal Grandfather    • Atrial fibrillation Brother        Social History     Socioeconomic History   • Marital status:      Spouse name: Not on file   • Number of children: Not on file   • Years of education: Not on file   • Highest education level: Not on file   Tobacco Use   • Smoking status: Former Smoker     Packs/day: 0.30     Years: 16.00     Pack years: 4.80     Start date: 1958     Last attempt to quit: 1974     Years since quittin.3   • Smokeless tobacco: Never Used   • Tobacco comment: 1-2 cigs a day.   Substance and Sexual Activity   • Alcohol use: Yes     Alcohol/week: 0.6 oz     Types: 1 Cans of beer per week     Frequency: 2-3 times a week     Comment: a week   • Drug use: No   • Sexual activity: Defer   Social History Narrative    Caffeine: 2 glasses of iced tea daily       Allergies   Allergen Reactions   • Ace Inhibitors Cough   • Lisinopril Cough   • Sulfadiazine Other (See Comments)     Mother told her she was allergic as a child.   • Trazodone  Irritability   • Premarin [Conjugated Estrogens] Other (See Comments)     Not sure   • Sulfa Antibiotics Rash         Current Outpatient Medications:   •  aspirin 81 MG EC tablet, Take 243 mg by mouth Daily., Disp: , Rfl:   •  atorvastatin (LIPITOR) 10 MG tablet, TAKE ONE TABLET BY MOUTH DAILY (Patient taking differently: 4 times weekly), Disp: 90 tablet, Rfl: 1  •  metoprolol succinate XL (TOPROL XL) 25 MG 24 hr tablet, Take 1 tablet by mouth Daily. (Patient taking differently: Take 12.5 mg by mouth Daily.), Disp: 30 tablet, Rfl: 11  •  Omega-3 Fatty Acids (FISH OIL) 1000 MG capsule capsule, Take  by mouth Daily With Breakfast., Disp: , Rfl:   •  omeprazole (priLOSEC) 20 MG capsule, Take 1 capsule by mouth Daily. (Patient taking differently: Take 20 mg by mouth Daily As Needed.), Disp: 30 capsule, Rfl: 2  •  zolpidem (AMBIEN) 10 MG tablet, Take 1 tablet by mouth At Night As Needed for Sleep. Pt tried and failed belsomra and trazodone, Disp: 30 tablet, Rfl: 2    The following portions of the patient's history were reviewed today and updated as appropriate: allergies, current medications, past family history, past medical history, past social history, past surgical history and problem list     Review of Systems   Constitution: Negative for chills and fever.   HENT: Positive for hearing loss.    Eyes: Negative.    Cardiovascular: Positive for irregular heartbeat and palpitations. Negative for chest pain, claudication, cyanosis, dyspnea on exertion, leg swelling, near-syncope, orthopnea, paroxysmal nocturnal dyspnea and syncope.   Respiratory: Negative for cough, shortness of breath and snoring.    Endocrine: Positive for cold intolerance and heat intolerance.   Hematologic/Lymphatic: Does not bruise/bleed easily.   Skin: Negative for poor wound healing.   Musculoskeletal: Negative.    Gastrointestinal: Positive for heartburn. Negative for abdominal pain, hematemesis, melena, nausea and vomiting.   Genitourinary:  "Negative.  Negative for hematuria.   Psychiatric/Behavioral: Negative.    Allergic/Immunologic: Negative.        Objective:     Vitals:    09/23/19 1017   BP: 126/58   BP Location: Right arm   Patient Position: Sitting   Cuff Size: Adult   Pulse: 61   Resp: 18   Temp: 97 °F (36.1 °C)   SpO2: 98%   Weight: 49.9 kg (110 lb)   Height: 162.6 cm (64\")       Physical Exam   Constitutional: She is oriented to person, place, and time. She appears well-developed and well-nourished. No distress.   HENT:   Head: Normocephalic.   Eyes: Conjunctivae are normal. Pupils are equal, round, and reactive to light.   Neck: Neck supple. No JVD present. No thyromegaly present.   Cardiovascular: Normal rate, regular rhythm, normal heart sounds and intact distal pulses. Exam reveals no gallop and no friction rub.   No murmur heard.  Pulmonary/Chest: Effort normal and breath sounds normal. No respiratory distress. She has no wheezes. She has no rales. She exhibits no tenderness.   Abdominal: Soft. Bowel sounds are normal.   Musculoskeletal: Normal range of motion. She exhibits no edema.   Neurological: She is alert and oriented to person, place, and time.   Skin: Skin is warm and dry.   Psychiatric: She has a normal mood and affect. Her behavior is normal. Thought content normal.   Vitals reviewed.      Lab and Diagnostic Review:  GXT cardiolite 9/6/19  · Pt states no CP at baseline. Pt states no CP during exercise. Baseline EKG normal.  · Excellent exercise tolerance with and expected exercise time 4:20. Actual exercise 8:00. THR achieved at 5:40.  · No ST segment shift from baseline was seen with exercise  · Normal blood pressure and heart rate response to exercise. Oxygen saturation of 99% throughout exercise.  · When the stress and rest Cardiolite images were compared no areas of fixed hypoperfusion or stress-induced hypoperfusion were seen. The 3D reconstruction showed normal contractility in all myocardial segments with a calculated " ejection fraction of 66%. No left ventricular dilation was seen with stress..  · No evidence of inducible ischemia by clinical, electrocardiographic or scintigraphic criteria. This is a low risk study for future cardiac events    Echo 9/6/19  · Cardiac chamber sizes and wall thicknesses are normal.  · Global and segmental LV wall motion is normal. The calculated LV ejection fraction is 70%.  · There is a trileaflet aortic valve that exhibits normal function. The valve is mildly sclerotic.  · There is mitral annular calcification with mild mitral regurgitation. There is probably prolapse of the anterior mitral leaflet.  · PA hypertension is not suggested.  · Agitated saline with Valsalva is weekly positive after 4-5 beats suggesting a posssible pulmonary AVM.    MCOT normal sinus rhythm with an average heart rate of 67, minimum heart rate of 42 and max heart of 132.  One 4 beat run of NSVT, occasional PVCs (1%) no atrial fibrillation or SVT  Assessment and Plan:   1. Palpitations  Likely symptomatic PVCs, some brief SVT noted on a prior holter    2. NSVT  4 beats  Asymptomatic   Occurred while sleeping    3. Possible pulmonary AVM  She is active without symptoms  Refer to cardiology for further evaluation    4. Essential hypertension  Controlled      RV PRN    It has been a pleasure to participate in the care of this patient.  Patient was instructed to call the [facility] with any questions, concerns, or worsening symptoms.    *Please note that portions of this note were completed with a voice recognition program. Efforts were made to edit the dictations, but occasionally words are mistranscribed.

## 2019-09-23 ENCOUNTER — OFFICE VISIT (OUTPATIENT)
Dept: CARDIOLOGY | Facility: HOSPITAL | Age: 79
End: 2019-09-23

## 2019-09-23 VITALS
WEIGHT: 110 LBS | HEART RATE: 61 BPM | OXYGEN SATURATION: 98 % | DIASTOLIC BLOOD PRESSURE: 58 MMHG | TEMPERATURE: 97 F | BODY MASS INDEX: 18.78 KG/M2 | RESPIRATION RATE: 18 BRPM | SYSTOLIC BLOOD PRESSURE: 126 MMHG | HEIGHT: 64 IN

## 2019-09-23 DIAGNOSIS — I10 ESSENTIAL HYPERTENSION: ICD-10-CM

## 2019-09-23 DIAGNOSIS — I47.29 NSVT (NONSUSTAINED VENTRICULAR TACHYCARDIA) (HCC): ICD-10-CM

## 2019-09-23 DIAGNOSIS — Q27.30 AVM (ARTERIOVENOUS MALFORMATION): Primary | ICD-10-CM

## 2019-09-23 DIAGNOSIS — R00.2 PALPITATIONS: ICD-10-CM

## 2019-09-23 PROCEDURE — 99213 OFFICE O/P EST LOW 20 MIN: CPT | Performed by: NURSE PRACTITIONER

## 2019-09-23 RX ORDER — CHLORAL HYDRATE 500 MG
CAPSULE ORAL
COMMUNITY

## 2019-10-03 ENCOUNTER — CONSULT (OUTPATIENT)
Dept: CARDIOLOGY | Facility: CLINIC | Age: 79
End: 2019-10-03

## 2019-10-03 VITALS
WEIGHT: 110.6 LBS | SYSTOLIC BLOOD PRESSURE: 98 MMHG | HEART RATE: 60 BPM | BODY MASS INDEX: 18.88 KG/M2 | DIASTOLIC BLOOD PRESSURE: 68 MMHG | HEIGHT: 64 IN

## 2019-10-03 DIAGNOSIS — R00.2 INTERMITTENT PALPITATIONS: Primary | ICD-10-CM

## 2019-10-03 DIAGNOSIS — I10 ESSENTIAL HYPERTENSION: ICD-10-CM

## 2019-10-03 DIAGNOSIS — E78.00 PURE HYPERCHOLESTEROLEMIA: ICD-10-CM

## 2019-10-03 PROBLEM — I47.29 NSVT (NONSUSTAINED VENTRICULAR TACHYCARDIA): Status: ACTIVE | Noted: 2018-10-14

## 2019-10-03 PROCEDURE — 93000 ELECTROCARDIOGRAM COMPLETE: CPT | Performed by: INTERNAL MEDICINE

## 2019-10-03 PROCEDURE — 99204 OFFICE O/P NEW MOD 45 MIN: CPT | Performed by: INTERNAL MEDICINE

## 2019-11-10 NOTE — PROGRESS NOTES
Subjective   Joselyn Segura is a 79 y.o. female.     History of Present Illness     Here for f/u on:    Insomnia - she is using ambien nightly, but is trying to cut in half and has been doing ok, sleep has been decent    Hyperlipidemia -taking Lipitor regularly. Last flp was 7/19. She takes it 4d/week.     Palpitations/nonsustained v tach - she has seen cardiology (Dr Mcbride). Her echo was abnormal but acceptable  and stress test negative for ischemia.  She wore a  heart monitor which showed occasional PACs and PVCs and one 14-beat run of ventricular tachycardia.  He recommended to continue the toprol to suppress her palpitations. She does have f/u in May w/ him    GERD - she had been using zantac as needed,which worked well, but since it was taken off market has not used anything.  She use the omeprazole if the reflux is really bad but has not needed to do this recently  She had EGD in '15 that showed gastritis and HH, no H pylo (Dr Sparks)     htn - BP was up some last week - was 160s/70s, pulse 65-70 for about 5 days. Usually runs lower, but does fluctuate quite a bit. No stresses or major changes so she is not sure why it was up. She is taking 1/2 metoprolol dose daily    She will be in [place] starting in January but will come home for a week in February    Current Outpatient Medications on File Prior to Visit   Medication Sig Dispense Refill   • aspirin 81 MG EC tablet Take 243 mg by mouth Daily.     • atorvastatin (LIPITOR) 10 MG tablet TAKE ONE TABLET BY MOUTH DAILY (Patient taking differently: 4 times weekly) 90 tablet 1   • CALCIUM CARBONATE PO Take  by mouth Daily.     • Coenzyme Q10 (COQ-10) 200 MG capsule Take 200 mg by mouth Daily.     • GLUCOSAMINE-CHONDROITIN PO Take  by mouth Daily.     • metoprolol succinate XL (TOPROL XL) 25 MG 24 hr tablet Take 1 tablet by mouth Daily. (Patient taking differently: Take 12.5 mg by mouth Daily.) 30 tablet 11   • Omega-3 Fatty Acids (FISH OIL) 1000 MG capsule capsule  "Take  by mouth Daily With Breakfast.     • omeprazole (priLOSEC) 20 MG capsule Take 1 capsule by mouth Daily. (Patient taking differently: Take 20 mg by mouth Daily As Needed.) 30 capsule 2   • Probiotic Product (PROBIOTIC DAILY PO) Take  by mouth Daily.     • zolpidem (AMBIEN) 10 MG tablet Take 1 tablet by mouth At Night As Needed for Sleep. Pt tried and failed belsomra and trazodone 30 tablet 2     No current facility-administered medications on file prior to visit.        The following portions of the patient's history were reviewed and updated as appropriate: allergies, current medications, past family history, past medical history, past social history, past surgical history and problem list.    Review of Systems   Constitutional: Negative for activity change, appetite change and fever.   Respiratory: Negative for shortness of breath.    Cardiovascular: Positive for palpitations (worse over the summer but better now; she does feel heat bring them on). Negative for chest pain and leg swelling.   Gastrointestinal: Positive for GERD and indigestion.   Skin: Negative.    Allergic/Immunologic: Negative for immunocompromised state.   Neurological: Negative for seizures, memory problem and confusion.   Psychiatric/Behavioral: Negative for agitation.       Objective   /66 (BP Location: Left arm)   Pulse 62   Temp 98.6 °F (37 °C) (Temporal)   Ht 162.6 cm (64\")   Wt 52 kg (114 lb 9.6 oz)   SpO2 97%   BMI 19.67 kg/m²   Physical Exam   Constitutional: She is oriented to person, place, and time. She appears well-developed and well-nourished. No distress.   HENT:   Head: Normocephalic and atraumatic.   Eyes: Conjunctivae and EOM are normal. Pupils are equal, round, and reactive to light. Right eye exhibits no discharge. Left eye exhibits no discharge.   Neck: Normal range of motion. Neck supple.   Cardiovascular: Normal rate and regular rhythm.   Pulmonary/Chest: Effort normal and breath sounds normal. "   Abdominal: Soft. Bowel sounds are normal. She exhibits no distension. There is no tenderness.   Musculoskeletal: She exhibits no edema.   Neurological: She is alert and oriented to person, place, and time. No cranial nerve deficit.   Skin: Skin is warm and dry. No rash noted. She is not diaphoretic.   Psychiatric: She has a normal mood and affect. Her behavior is normal. Judgment and thought content normal.   Nursing note and vitals reviewed.        Assessment/Plan   Joselyn was seen today for insomnia, hyperlipidemia, med refill and heartburn.    Diagnoses and all orders for this visit:    Pure hypercholesterolemia -she will be back in [place] in February and we will plan to check the fasting labs then.    Intermittent palpitations - better; using low-dose metoprolol daily.  She has had some elevated blood pressure readings last week.  She will continue to monitor and if blood pressure stays high she will go to the full dose of the metoprolol    Primary insomnia - Pt has already signed controlled substance contract. Evans done today and appropriate.   -     zolpidem (AMBIEN) 10 MG tablet; Take 1 tablet by mouth At Night As Needed for Sleep. Pt tried and failed belsomra and trazodone    Need for immunization against influenza  -     Fluad Tri 65yr ([id number])    Gastroesophageal reflux disease without esophagitis -we discussed she could use Pepcid over-the-counter since the ranitidine is no longer available.  If she feels like she needs the omeprazole, she will call and I can send in a refill

## 2019-11-11 ENCOUNTER — OFFICE VISIT (OUTPATIENT)
Dept: INTERNAL MEDICINE | Facility: CLINIC | Age: 79
End: 2019-11-11

## 2019-11-11 VITALS
TEMPERATURE: 98.6 F | DIASTOLIC BLOOD PRESSURE: 66 MMHG | OXYGEN SATURATION: 97 % | HEIGHT: 64 IN | WEIGHT: 114.6 LBS | HEART RATE: 62 BPM | SYSTOLIC BLOOD PRESSURE: 138 MMHG | BODY MASS INDEX: 19.56 KG/M2

## 2019-11-11 DIAGNOSIS — F51.01 PRIMARY INSOMNIA: ICD-10-CM

## 2019-11-11 DIAGNOSIS — K21.9 GASTROESOPHAGEAL REFLUX DISEASE WITHOUT ESOPHAGITIS: ICD-10-CM

## 2019-11-11 DIAGNOSIS — Z23 NEED FOR IMMUNIZATION AGAINST INFLUENZA: ICD-10-CM

## 2019-11-11 DIAGNOSIS — E78.00 PURE HYPERCHOLESTEROLEMIA: Primary | ICD-10-CM

## 2019-11-11 DIAGNOSIS — R00.2 INTERMITTENT PALPITATIONS: ICD-10-CM

## 2019-11-11 PROCEDURE — G0008 ADMIN INFLUENZA VIRUS VAC: HCPCS | Performed by: INTERNAL MEDICINE

## 2019-11-11 PROCEDURE — 90653 IIV ADJUVANT VACCINE IM: CPT | Performed by: INTERNAL MEDICINE

## 2019-11-11 PROCEDURE — 99214 OFFICE O/P EST MOD 30 MIN: CPT | Performed by: INTERNAL MEDICINE

## 2019-11-11 RX ORDER — ZOLPIDEM TARTRATE 10 MG/1
10 TABLET ORAL NIGHTLY PRN
Qty: 30 TABLET | Refills: 2 | Status: SHIPPED | OUTPATIENT
Start: 2019-11-11 | End: 2020-02-03 | Stop reason: SDUPTHER

## 2019-12-20 ENCOUNTER — TELEPHONE (OUTPATIENT)
Dept: INTERNAL MEDICINE | Facility: CLINIC | Age: 79
End: 2019-12-20

## 2019-12-20 RX ORDER — OLMESARTAN MEDOXOMIL 20 MG/1
20 TABLET ORAL DAILY
Qty: 30 TABLET | Refills: 5 | Status: SHIPPED | OUTPATIENT
Start: 2019-12-20 | End: 2020-03-26

## 2019-12-20 RX ORDER — METOPROLOL SUCCINATE 50 MG/1
50 TABLET, EXTENDED RELEASE ORAL DAILY
Qty: 90 TABLET | Refills: 1 | Status: SHIPPED | OUTPATIENT
Start: 2019-12-20 | End: 2020-07-07 | Stop reason: SDUPTHER

## 2019-12-20 NOTE — TELEPHONE ENCOUNTER
She sent me readings. She has been taking toprol 50 and readings still a little high - 140-170/60-70s, HR in 60-70s. Will add benicar to the toprol

## 2020-02-01 NOTE — PROGRESS NOTES
Subjective   Joselyn Segura is a 79 y.o. female.     History of Present Illness     Here for f/u on:    Insomnia-she is on Ambien.  She does try to take 1/2 tablet when she can.  This is working pretty well    Hyperlipidemia-she is on Lipitor    Nonsustained V. tach- she is on metoprolol.  She will see Dr. Mcbride again in May.  Has not had many palpitations recently    Hypertension-blood pressure did go up since last visit and we ended up raising her metoprolol to 51 a day and also adding olmesartan 20 mg daily.  [place] in [place] over the last few months, and BPstend to go lower in the heat.  Has not had any high ones recently    LBP/R SI pain over last few weeks -noticed it after she played tennis.  Is a little bit better over the last week.  No pain down the leg        Current Outpatient Medications on File Prior to Visit   Medication Sig Dispense Refill   • aspirin 81 MG EC tablet Take 243 mg by mouth Daily.     • atorvastatin (LIPITOR) 10 MG tablet TAKE ONE TABLET BY MOUTH DAILY (Patient taking differently: 4 times weekly) 90 tablet 1   • CALCIUM CARBONATE PO Take  by mouth Daily.     • Coenzyme Q10 (COQ-10) 200 MG capsule Take 200 mg by mouth Daily.     • GLUCOSAMINE-CHONDROITIN PO Take  by mouth Daily.     • metoprolol succinate XL (TOPROL-XL) 50 MG 24 hr tablet Take 1 tablet by mouth Daily. 90 tablet 1   • olmesartan (BENICAR) 20 MG tablet Take 1 tablet by mouth Daily. 30 tablet 5   • Omega-3 Fatty Acids (FISH OIL) 1000 MG capsule capsule Take  by mouth Daily With Breakfast.     • omeprazole (priLOSEC) 20 MG capsule Take 1 capsule by mouth Daily. (Patient taking differently: Take 20 mg by mouth Daily As Needed.) 30 capsule 2   • Probiotic Product (PROBIOTIC DAILY PO) Take  by mouth Daily.     • zolpidem (AMBIEN) 10 MG tablet Take 1 tablet by mouth At Night As Needed for Sleep. Pt tried and failed belsomra and trazodone 30 tablet 2     No current facility-administered medications on file prior to visit.   "      The following portions of the patient's history were reviewed and updated as appropriate: allergies, current medications, past family history, past medical history, past social history, past surgical history and problem list.    Review of Systems   Constitutional: Negative for fever, unexpected weight gain and unexpected weight loss.   Respiratory: Negative for shortness of breath.    Cardiovascular: Positive for palpitations. Negative for chest pain.   Musculoskeletal: Positive for back pain (r si). Negative for gait problem and joint swelling.   Skin: Negative.    Allergic/Immunologic: Negative for immunocompromised state.   Neurological: Negative for seizures, memory problem and confusion.   Psychiatric/Behavioral: Positive for sleep disturbance. Negative for agitation.       Objective   /64 (BP Location: Left arm, Patient Position: Sitting)   Pulse 63   Temp 98.5 °F (36.9 °C) (Temporal)   Ht 162.6 cm (64\")   Wt 52.2 kg (115 lb)   SpO2 99%   BMI 19.74 kg/m²   Physical Exam   Constitutional: She is oriented to person, place, and time. She appears well-developed and well-nourished. No distress.   HENT:   Head: Normocephalic and atraumatic.   Eyes: Pupils are equal, round, and reactive to light. Conjunctivae and EOM are normal. Right eye exhibits no discharge. Left eye exhibits no discharge.   Neck: Normal range of motion. Neck supple.   Cardiovascular: Normal rate and regular rhythm.   Pulmonary/Chest: Effort normal and breath sounds normal.   Musculoskeletal: She exhibits tenderness (over r si joint). She exhibits no edema.   Negative straight leg raise on the right and hip internal rotation is normal   Neurological: She is alert and oriented to person, place, and time. No cranial nerve deficit.   Skin: Skin is warm and dry. No rash noted. She is not diaphoretic.   Psychiatric: She has a normal mood and affect. Her behavior is normal. Judgment and thought content normal.   Nursing note and vitals " reviewed.        Assessment/Plan   Joselyn was seen today for insomnia and med refill.    Diagnoses and all orders for this visit:    Primary insomnia-patient stable on Ambien.  She is trying to decrease dosage.  Pt has already signed controlled substance contract. Evans done today and appropriate.   -     zolpidem (AMBIEN) 10 MG tablet; Take 1 tablet by mouth At Night As Needed for Sleep. Pt tried and failed belsomra and trazodone    Pure hypercholesterolemia - she will return for fasting labs  -     Comprehensive Metabolic Panel; Future  -     Lipid Panel; Future  -     CBC (No Diff); Future  -     TSH; Future    Essential hypertension-looks better on current regimen.  It would be okay to hold the irbesartan if blood pressure is low while she is in [place]  -     Comprehensive Metabolic Panel; Future    Intermittent palpitations - stable          Preventative-she had her flu shot

## 2020-02-03 ENCOUNTER — OFFICE VISIT (OUTPATIENT)
Dept: INTERNAL MEDICINE | Facility: CLINIC | Age: 80
End: 2020-02-03

## 2020-02-03 VITALS
HEART RATE: 63 BPM | HEIGHT: 64 IN | TEMPERATURE: 98.5 F | SYSTOLIC BLOOD PRESSURE: 132 MMHG | OXYGEN SATURATION: 99 % | DIASTOLIC BLOOD PRESSURE: 64 MMHG | WEIGHT: 115 LBS | BODY MASS INDEX: 19.63 KG/M2

## 2020-02-03 DIAGNOSIS — I10 ESSENTIAL HYPERTENSION: ICD-10-CM

## 2020-02-03 DIAGNOSIS — E78.00 PURE HYPERCHOLESTEROLEMIA: ICD-10-CM

## 2020-02-03 DIAGNOSIS — R00.2 INTERMITTENT PALPITATIONS: ICD-10-CM

## 2020-02-03 DIAGNOSIS — F51.01 PRIMARY INSOMNIA: Primary | ICD-10-CM

## 2020-02-03 PROCEDURE — 99214 OFFICE O/P EST MOD 30 MIN: CPT | Performed by: INTERNAL MEDICINE

## 2020-02-03 RX ORDER — ZOLPIDEM TARTRATE 10 MG/1
10 TABLET ORAL NIGHTLY PRN
Qty: 30 TABLET | Refills: 2 | Status: CANCELLED | OUTPATIENT
Start: 2020-02-03

## 2020-02-03 RX ORDER — ZOLPIDEM TARTRATE 10 MG/1
10 TABLET ORAL NIGHTLY PRN
Qty: 30 TABLET | Refills: 2 | Status: SHIPPED | OUTPATIENT
Start: 2020-02-03 | End: 2020-04-17 | Stop reason: SDUPTHER

## 2020-02-05 ENCOUNTER — LAB (OUTPATIENT)
Dept: INTERNAL MEDICINE | Facility: CLINIC | Age: 80
End: 2020-02-05

## 2020-02-05 DIAGNOSIS — E78.00 PURE HYPERCHOLESTEROLEMIA: ICD-10-CM

## 2020-02-05 DIAGNOSIS — I10 ESSENTIAL HYPERTENSION: ICD-10-CM

## 2020-02-06 LAB
ALBUMIN SERPL-MCNC: 4.6 G/DL (ref 3.5–5.2)
ALBUMIN/GLOB SERPL: 2.3 G/DL
ALP SERPL-CCNC: 75 U/L (ref 39–117)
ALT SERPL-CCNC: 19 U/L (ref 1–33)
AST SERPL-CCNC: 22 U/L (ref 1–32)
BILIRUB SERPL-MCNC: 0.6 MG/DL (ref 0.2–1.2)
BUN SERPL-MCNC: 18 MG/DL (ref 8–23)
BUN/CREAT SERPL: 21.4 (ref 7–25)
CALCIUM SERPL-MCNC: 10.7 MG/DL (ref 8.6–10.5)
CHLORIDE SERPL-SCNC: 102 MMOL/L (ref 98–107)
CHOLEST SERPL-MCNC: 160 MG/DL (ref 0–200)
CO2 SERPL-SCNC: 29 MMOL/L (ref 22–29)
CREAT SERPL-MCNC: 0.84 MG/DL (ref 0.57–1)
ERYTHROCYTE [DISTWIDTH] IN BLOOD BY AUTOMATED COUNT: 12.2 % (ref 12.3–15.4)
GLOBULIN SER CALC-MCNC: 2 GM/DL
GLUCOSE SERPL-MCNC: 90 MG/DL (ref 65–99)
HCT VFR BLD AUTO: 38.3 % (ref 34–46.6)
HDLC SERPL-MCNC: 70 MG/DL (ref 40–60)
HGB BLD-MCNC: 12.9 G/DL (ref 12–15.9)
LDLC SERPL CALC-MCNC: 75 MG/DL (ref 0–100)
MCH RBC QN AUTO: 29.5 PG (ref 26.6–33)
MCHC RBC AUTO-ENTMCNC: 33.7 G/DL (ref 31.5–35.7)
MCV RBC AUTO: 87.6 FL (ref 79–97)
PLATELET # BLD AUTO: 222 10*3/MM3 (ref 140–450)
POTASSIUM SERPL-SCNC: 4.4 MMOL/L (ref 3.5–5.2)
PROT SERPL-MCNC: 6.6 G/DL (ref 6–8.5)
RBC # BLD AUTO: 4.37 10*6/MM3 (ref 3.77–5.28)
SODIUM SERPL-SCNC: 141 MMOL/L (ref 136–145)
TRIGL SERPL-MCNC: 77 MG/DL (ref 0–150)
TSH SERPL DL<=0.005 MIU/L-ACNC: 2.38 UIU/ML (ref 0.27–4.2)
VLDLC SERPL CALC-MCNC: 15.4 MG/DL
WBC # BLD AUTO: 4.99 10*3/MM3 (ref 3.4–10.8)

## 2020-03-26 ENCOUNTER — TELEPHONE (OUTPATIENT)
Dept: INTERNAL MEDICINE | Facility: CLINIC | Age: 80
End: 2020-03-26

## 2020-03-26 RX ORDER — AMLODIPINE BESYLATE 5 MG/1
5 TABLET ORAL DAILY
Qty: 30 TABLET | Refills: 5 | Status: SHIPPED | OUTPATIENT
Start: 2020-03-26 | End: 2020-07-07

## 2020-03-26 NOTE — TELEPHONE ENCOUNTER
She sent message that she had a nagging cough for a few weeks.  No fever, and otherwise feels well.  No known exposure to COVID-19. She wonders if it could be the olmesartan as she did have a cough with ACE inhibitor's before.  We decided to have her hold the olmesartan for a few weeks and I will send in amlodipine instead

## 2020-04-17 DIAGNOSIS — F51.01 PRIMARY INSOMNIA: ICD-10-CM

## 2020-04-17 RX ORDER — ZOLPIDEM TARTRATE 10 MG/1
10 TABLET ORAL NIGHTLY PRN
Qty: 30 TABLET | Refills: 1 | Status: SHIPPED | OUTPATIENT
Start: 2020-04-17 | End: 2020-07-07 | Stop reason: SDUPTHER

## 2020-05-07 ENCOUNTER — TELEPHONE (OUTPATIENT)
Dept: INTERNAL MEDICINE | Facility: CLINIC | Age: 80
End: 2020-05-07

## 2020-05-07 NOTE — TELEPHONE ENCOUNTER
PT CALLED WANTING TO KNOW IF SHE NEEDED TO COME IN IMMEDIATLY FOR BLOOD WORK. PT STATED THAT SHE WAS SUPPOSE TO HAVE BW DONE IN April.    PT CONTACT [phone]     PLEASE ADVISE

## 2020-05-07 NOTE — TELEPHONE ENCOUNTER
Spoke with pt and advised. She verbalized good understanding and will call back 1 week before she runs out of the ambien to get scheduled. We thanked each other and ended the call.

## 2020-05-07 NOTE — TELEPHONE ENCOUNTER
We are ok rechecking labs in August, since we did in 2/20.  She will be due for a f/u for the ambien when she needs a refill, and we can do this by video/phone. I think she will need in June

## 2020-07-07 ENCOUNTER — OFFICE VISIT (OUTPATIENT)
Dept: INTERNAL MEDICINE | Facility: CLINIC | Age: 80
End: 2020-07-07

## 2020-07-07 ENCOUNTER — RESULTS ENCOUNTER (OUTPATIENT)
Dept: INTERNAL MEDICINE | Facility: CLINIC | Age: 80
End: 2020-07-07

## 2020-07-07 VITALS
OXYGEN SATURATION: 97 % | BODY MASS INDEX: 19.56 KG/M2 | SYSTOLIC BLOOD PRESSURE: 122 MMHG | WEIGHT: 114.6 LBS | DIASTOLIC BLOOD PRESSURE: 64 MMHG | HEIGHT: 64 IN | TEMPERATURE: 98.6 F | HEART RATE: 61 BPM

## 2020-07-07 DIAGNOSIS — K59.09 OTHER CONSTIPATION: ICD-10-CM

## 2020-07-07 DIAGNOSIS — Z12.11 COLON CANCER SCREENING: ICD-10-CM

## 2020-07-07 DIAGNOSIS — I10 ESSENTIAL HYPERTENSION: ICD-10-CM

## 2020-07-07 DIAGNOSIS — F51.01 PRIMARY INSOMNIA: ICD-10-CM

## 2020-07-07 DIAGNOSIS — E78.00 PURE HYPERCHOLESTEROLEMIA: Primary | ICD-10-CM

## 2020-07-07 DIAGNOSIS — R05.9 COUGH: ICD-10-CM

## 2020-07-07 DIAGNOSIS — I47.29 NSVT (NONSUSTAINED VENTRICULAR TACHYCARDIA) (HCC): ICD-10-CM

## 2020-07-07 PROCEDURE — 99214 OFFICE O/P EST MOD 30 MIN: CPT | Performed by: INTERNAL MEDICINE

## 2020-07-07 RX ORDER — METOPROLOL SUCCINATE 50 MG/1
50 TABLET, EXTENDED RELEASE ORAL DAILY
Qty: 90 TABLET | Refills: 1 | Status: SHIPPED | OUTPATIENT
Start: 2020-07-07 | End: 2021-03-08 | Stop reason: SDUPTHER

## 2020-07-07 RX ORDER — ZOLPIDEM TARTRATE 10 MG/1
10 TABLET ORAL NIGHTLY PRN
Qty: 30 TABLET | Refills: 2 | Status: SHIPPED | OUTPATIENT
Start: 2020-07-07 | End: 2020-10-26 | Stop reason: SDUPTHER

## 2020-07-07 RX ORDER — OLMESARTAN MEDOXOMIL 20 MG/1
20 TABLET ORAL
COMMUNITY
End: 2020-11-23 | Stop reason: SDUPTHER

## 2020-07-07 NOTE — PROGRESS NOTES
Subjective   Joselyn Segura is a 79 y.o. female.     History of Present Illness     Here for f/u on:    Insomnia - on ambien, 1/2-1 qhs. This is working pretty well, patient has been sleeping better and feels more rested    Hyperlipidemia - on lipitor; tolerates.    Nonsustained V. tach- she is on metoprolol. She has appt w/ Dr Mcbride in 11/20.  Noticed palpitations in recent months    htn - she is  metoprolol xl 50 and is taking the olmesartan just 3days/week. We had tried norvasc given her some constipation.  She feels like if she takes olmesartan every day her blood pressure will drop too low, especially on the days that she plays golf or tennis.  She is trying to drink plenty of fluids    Cough- mild.nonproductive.  Did hold the olmesartan but didn't think it made much difference.  Have allergy symptoms with nasal congestion and some postnasal drip.  She does use Claritin-D occasionally (doesn't seem to increase palpitations).       BM every few days ands are small over last 7 months. Never been real regular. Straining sometimes. Using prune juice as needed, If she hasn't gone in a few days  Last Colon was in '12, repeat in 10. No FH of colon cancer    Current Outpatient Medications on File Prior to Visit   Medication Sig Dispense Refill   • aspirin 81 MG EC tablet Take 243 mg by mouth Daily.     • atorvastatin (LIPITOR) 10 MG tablet TAKE ONE TABLET BY MOUTH DAILY (Patient taking differently: 4 times weekly) 90 tablet 1   • Coenzyme Q10 (COQ-10) 200 MG capsule Take 200 mg by mouth Daily.     • GLUCOSAMINE-CHONDROITIN PO Take  by mouth Daily.     • metoprolol succinate XL (TOPROL-XL) 50 MG 24 hr tablet Take 1 tablet by mouth Daily. 90 tablet 1   • olmesartan (BENICAR) 20 MG tablet Take 20 mg by mouth Daily. 3 days a week     • Omega-3 Fatty Acids (FISH OIL) 1000 MG capsule capsule Take  by mouth Daily With Breakfast.     • omeprazole (priLOSEC) 20 MG capsule Take 1 capsule by mouth Daily. (Patient taking  "differently: Take 20 mg by mouth Daily As Needed.) 30 capsule 2   • Probiotic Product (PROBIOTIC DAILY PO) Take  by mouth Daily.     • zolpidem (AMBIEN) 10 MG tablet Take 1 tablet by mouth At Night As Needed for Sleep. Pt tried and failed belsomra and trazodone 30 tablet 1   • [DISCONTINUED] amLODIPine (Norvasc) 5 MG tablet Take 1 tablet by mouth Daily. 30 tablet 5   • [DISCONTINUED] CALCIUM CARBONATE PO Take  by mouth Daily.       No current facility-administered medications on file prior to visit.        The following portions of the patient's history were reviewed and updated as appropriate: allergies, current medications, past family history, past medical history, past social history, past surgical history and problem list.    Review of Systems   Constitutional: Negative for activity change, appetite change, fever, unexpected weight gain and unexpected weight loss.   HENT: Positive for postnasal drip, rhinorrhea and sinus pressure.    Respiratory: Positive for cough. Negative for shortness of breath.    Cardiovascular: Negative for chest pain, palpitations (none recently) and leg swelling.   Gastrointestinal: Positive for constipation. Negative for abdominal distention, abdominal pain, anal bleeding, blood in stool and diarrhea.   Allergic/Immunologic: Positive for environmental allergies. Negative for immunocompromised state.   Psychiatric/Behavioral: Positive for sleep disturbance. Negative for stress.       Objective   /64 (BP Location: Right arm, Patient Position: Sitting)   Pulse 61   Temp 98.6 °F (37 °C) (Infrared)   Ht 162.6 cm (64\")   Wt 52 kg (114 lb 9.6 oz)   SpO2 97%   BMI 19.67 kg/m²   Physical Exam   Constitutional: She is oriented to person, place, and time. She appears well-developed and well-nourished. No distress.   HENT:   Head: Normocephalic and atraumatic.   Eyes: Pupils are equal, round, and reactive to light. Conjunctivae and EOM are normal. Right eye exhibits no discharge. Left " eye exhibits no discharge.   Neck: Normal range of motion. Neck supple.   Cardiovascular: Normal rate and regular rhythm.   Pulmonary/Chest: Effort normal and breath sounds normal.   Abdominal: Soft. Bowel sounds are normal. She exhibits no distension and no mass. There is no tenderness. There is no guarding.   Musculoskeletal: She exhibits no edema.   Neurological: She is alert and oriented to person, place, and time. No cranial nerve deficit.   Skin: Skin is warm and dry. No rash noted. She is not diaphoretic.   Psychiatric: She has a normal mood and affect. Her behavior is normal. Judgment and thought content normal.   Nursing note and vitals reviewed.        Assessment/Plan   Joselyn was seen today for insomnia, hypertension and hyperlipidemia.    Diagnoses and all orders for this visit:    Pure hypercholesterolemia-labs are due in August.  I will go ahead and put an order in for this and she will return  -     Comprehensive Metabolic Panel; Future  -     Lipid Panel; Future    NSVT (nonsustained ventricular tachycardia) (CMS/HCC)-full.  She has follow-up with cardiology later this year    Essential hypertension-trolled on current regimen.  We will continue    Primary insomnia-tolerating and doing well with Ambien.Pt has already signed controlled substance contract. Evans done today and appropriate  -     zolpidem (AMBIEN) 10 MG tablet; Take 1 tablet by mouth At Night As Needed for Sleep. Pt tried and failed belsomra and trazodone    Colon cancer screening  -     Cologuard - Stool, Per Rectum; Future    Cough-believe from allergies.  She will try some Flonase since she has nasal congestion as well.    Other constipation/in bowel habits-she will try Metamucil daily and see if that bulks up her stool.  Also okay to use MiraLAX or the prune juice as needed as a laxative.  We discussed repeating her colonoscopy with the change in bowel habits and we will consider.  In the meantime, will do a Cologuard as she did  have much trouble with bowel prep at last colonoscopy  -     TSH; Future    Other orders  -     metoprolol succinate XL (TOPROL-XL) 50 MG 24 hr tablet; Take 1 tablet by mouth Daily.

## 2020-07-20 ENCOUNTER — TELEPHONE (OUTPATIENT)
Dept: INTERNAL MEDICINE | Facility: CLINIC | Age: 80
End: 2020-07-20

## 2020-07-21 ENCOUNTER — TELEPHONE (OUTPATIENT)
Dept: INTERNAL MEDICINE | Facility: CLINIC | Age: 80
End: 2020-07-21

## 2020-07-22 DIAGNOSIS — Z12.11 COLON CANCER SCREENING: Primary | ICD-10-CM

## 2020-07-22 DIAGNOSIS — R19.5 POSITIVE COLORECTAL CANCER SCREENING USING COLOGUARD TEST: ICD-10-CM

## 2020-07-27 DIAGNOSIS — Z12.11 SCREENING FOR COLON CANCER: Primary | ICD-10-CM

## 2020-08-03 ENCOUNTER — APPOINTMENT (OUTPATIENT)
Dept: PREADMISSION TESTING | Facility: HOSPITAL | Age: 80
End: 2020-08-03

## 2020-08-03 PROCEDURE — U0002 COVID-19 LAB TEST NON-CDC: HCPCS

## 2020-08-03 PROCEDURE — C9803 HOPD COVID-19 SPEC COLLECT: HCPCS

## 2020-08-03 PROCEDURE — U0004 COV-19 TEST NON-CDC HGH THRU: HCPCS

## 2020-08-04 LAB
REF LAB TEST METHOD: NORMAL
SARS-COV-2 RNA RESP QL NAA+PROBE: NOT DETECTED

## 2020-08-06 ENCOUNTER — OUTSIDE FACILITY SERVICE (OUTPATIENT)
Dept: GASTROENTEROLOGY | Facility: CLINIC | Age: 80
End: 2020-08-06

## 2020-08-06 ENCOUNTER — LAB REQUISITION (OUTPATIENT)
Dept: LAB | Facility: HOSPITAL | Age: 80
End: 2020-08-06

## 2020-08-06 DIAGNOSIS — Z12.11 ENCOUNTER FOR SCREENING FOR MALIGNANT NEOPLASM OF COLON: ICD-10-CM

## 2020-08-06 PROCEDURE — 88305 TISSUE EXAM BY PATHOLOGIST: CPT | Performed by: INTERNAL MEDICINE

## 2020-08-06 PROCEDURE — G0500 MOD SEDAT ENDO SERVICE >5YRS: HCPCS | Performed by: INTERNAL MEDICINE

## 2020-08-06 PROCEDURE — 45385 COLONOSCOPY W/LESION REMOVAL: CPT | Performed by: INTERNAL MEDICINE

## 2020-08-07 LAB
CYTO UR: NORMAL
LAB AP CASE REPORT: NORMAL
LAB AP CLINICAL INFORMATION: NORMAL
PATH REPORT.FINAL DX SPEC: NORMAL
PATH REPORT.GROSS SPEC: NORMAL

## 2020-08-26 ENCOUNTER — TELEPHONE (OUTPATIENT)
Dept: INTERNAL MEDICINE | Facility: CLINIC | Age: 80
End: 2020-08-26

## 2020-08-26 RX ORDER — OMEPRAZOLE 40 MG/1
40 CAPSULE, DELAYED RELEASE ORAL DAILY
Qty: 30 CAPSULE | Refills: 1 | Status: SHIPPED | OUTPATIENT
Start: 2020-08-26 | End: 2021-04-08 | Stop reason: SDUPTHER

## 2020-08-26 NOTE — TELEPHONE ENCOUNTER
Patient sent message today that ever since her colonoscopy she has been having some discomfort in the upper stomach area around the rib cage.  She has started omeprazole 20 mg but has not helped so far.  She did have an endoscopy done several years ago which showed some gastritis  I will go ahead and send in Prilosec 40 and see how she does with this.  If no better in the next few weeks, she will come in for an appointment

## 2020-09-21 ENCOUNTER — OFFICE VISIT (OUTPATIENT)
Dept: INTERNAL MEDICINE | Facility: CLINIC | Age: 80
End: 2020-09-21

## 2020-09-21 ENCOUNTER — LAB REQUISITION (OUTPATIENT)
Dept: LAB | Facility: HOSPITAL | Age: 80
End: 2020-09-21

## 2020-09-21 ENCOUNTER — TELEPHONE (OUTPATIENT)
Dept: INTERNAL MEDICINE | Facility: CLINIC | Age: 80
End: 2020-09-21

## 2020-09-21 VITALS
HEIGHT: 64 IN | WEIGHT: 110.8 LBS | DIASTOLIC BLOOD PRESSURE: 62 MMHG | BODY MASS INDEX: 18.92 KG/M2 | HEART RATE: 59 BPM | SYSTOLIC BLOOD PRESSURE: 120 MMHG | TEMPERATURE: 98 F | OXYGEN SATURATION: 97 %

## 2020-09-21 DIAGNOSIS — K59.09 OTHER CONSTIPATION: ICD-10-CM

## 2020-09-21 DIAGNOSIS — Z00.00 ROUTINE GENERAL MEDICAL EXAMINATION AT A HEALTH CARE FACILITY: ICD-10-CM

## 2020-09-21 DIAGNOSIS — E78.00 PURE HYPERCHOLESTEROLEMIA: ICD-10-CM

## 2020-09-21 DIAGNOSIS — Z23 NEED FOR INFLUENZA VACCINATION: ICD-10-CM

## 2020-09-21 DIAGNOSIS — R10.13 EPIGASTRIC PAIN: Primary | ICD-10-CM

## 2020-09-21 PROCEDURE — 36415 COLL VENOUS BLD VENIPUNCTURE: CPT | Performed by: INTERNAL MEDICINE

## 2020-09-21 PROCEDURE — G0008 ADMIN INFLUENZA VIRUS VAC: HCPCS | Performed by: INTERNAL MEDICINE

## 2020-09-21 PROCEDURE — 90694 VACC AIIV4 NO PRSRV 0.5ML IM: CPT | Performed by: INTERNAL MEDICINE

## 2020-09-21 PROCEDURE — 99214 OFFICE O/P EST MOD 30 MIN: CPT | Performed by: INTERNAL MEDICINE

## 2020-09-21 NOTE — PROGRESS NOTES
Subjective   Joselyn Segura is a 79 y.o. female.     History of Present Illness     Here for f/u on:    Stomach pain -ever since her colonoscopy done 8/6 (had +cologuard). The day she came home from colon felt like there was gas in the upper abdominal area around the rib cage B as well  She had started omeprazole 20 mg but did not help, so we did raise to 40mg at the end of August, and she feels it is better but still there.  Feels it most of the time.  Not worse with eating.  She also has felt like her reflux has been much worse, despite the omeprazole.  No nausea or vomiting but will occasionally have reflux with food. no other over-the-counter medications.  She had EGD in '15 that showed gastritis and HH, no H pylo (Dr Sparks)   Wt was down to 103 after her colon as she was worried about the positive Cologuard.  Has regained some of the weight she lost    Hypertension-she is taking the metoprolol but has been holding the olmesartan as her blood pressure does tend to run lower in the summer in the heat.    Current Outpatient Medications on File Prior to Visit   Medication Sig Dispense Refill   • aspirin 81 MG EC tablet Take 243 mg by mouth Daily.     • atorvastatin (LIPITOR) 10 MG tablet TAKE ONE TABLET BY MOUTH DAILY (Patient taking differently: 4 times weekly) 90 tablet 1   • Coenzyme Q10 (COQ-10) 200 MG capsule Take 200 mg by mouth Daily.     • GLUCOSAMINE-CHONDROITIN PO Take  by mouth Daily.     • metoprolol succinate XL (TOPROL-XL) 50 MG 24 hr tablet Take 1 tablet by mouth Daily. 90 tablet 1   • olmesartan (BENICAR) 20 MG tablet Take 20 mg by mouth Daily. 3 days a week     • Omega-3 Fatty Acids (FISH OIL) 1000 MG capsule capsule Take  by mouth Daily With Breakfast.     • omeprazole (priLOSEC) 40 MG capsule Take 1 capsule by mouth Daily. 30 capsule 1   • Probiotic Product (PROBIOTIC DAILY PO) Take  by mouth Daily.     • Sod Picosulfate-Mag Ox-Cit Acd 10-3.5-12 MG-GM -GM/160ML solution Take 1 kit by mouth Take As  "Directed. Follow instructions that were mailed to your home. If you didn't receive these call [phone]. 8 bottle 0   • zolpidem (AMBIEN) 10 MG tablet Take 1 tablet by mouth At Night As Needed for Sleep. Pt tried and failed belsomra and trazodone 30 tablet 2     No current facility-administered medications on file prior to visit.        The following portions of the patient's history were reviewed and updated as appropriate: allergies, current medications, past family history, past medical history, past social history, past surgical history and problem list.    Review of Systems   Constitutional: Positive for unexpected weight loss. Negative for activity change, appetite change, fatigue, fever and unexpected weight gain.   Cardiovascular: Negative for chest pain.   Gastrointestinal: Positive for abdominal pain, constipation (more regular with fiber), GERD and indigestion. Negative for diarrhea, nausea and vomiting.        Belching   Allergic/Immunologic: Negative for immunocompromised state.   Neurological: Negative for seizures, syncope and speech difficulty.       Objective   /62 (BP Location: Left arm, Patient Position: Sitting)   Pulse 59   Temp 98 °F (36.7 °C) (Infrared)   Ht 162.6 cm (64\")   Wt 50.3 kg (110 lb 12.8 oz)   SpO2 97%   BMI 19.02 kg/m²   Physical Exam  Constitutional:       General: She is not in acute distress.     Appearance: She is well-developed. She is not diaphoretic.   HENT:      Head: Normocephalic and atraumatic.   Eyes:      Conjunctiva/sclera: Conjunctivae normal.   Cardiovascular:      Rate and Rhythm: Normal rate and regular rhythm.      Heart sounds: Normal heart sounds. No murmur. No friction rub. No gallop.    Pulmonary:      Effort: Pulmonary effort is normal. No respiratory distress.      Breath sounds: Normal breath sounds. No wheezing.   Abdominal:      General: Abdomen is flat. Bowel sounds are normal. There is no distension.      Palpations: Abdomen is soft. " There is no mass.      Tenderness: There is abdominal tenderness (epigastric predominantly but some tenderness diffusely). There is no guarding or rebound.      Hernia: No hernia is present.   Skin:     General: Skin is warm and dry.   Neurological:      Mental Status: She is alert and oriented to person, place, and time.   Psychiatric:         Behavior: Behavior normal.         Thought Content: Thought content normal.         Judgment: Judgment normal.           Assessment/Plan   Joselyn was seen today for stomach issues.    Diagnoses and all orders for this visit:    Epigastric pain-we will continue the omeprazole and also get her scheduled for an endoscopy.  If this is normal, we may get a CAT scan of the abdomen pelvis  -     CBC & Differential; Future  -     CBC & Differential  -     Ambulatory referral for Screening EGD    Need for influenza vaccination  -     Fluad Quad 65+ yrs ([id number])    Pure hypercholesterolemia-she is on Lipitor.  -     Lipid Panel  -     Comprehensive Metabolic Panel    Other constipation-  -     TSH      Hypertension-she will monitor blood pressure and if it starts to rise we will add back the olmesartan

## 2020-09-21 NOTE — TELEPHONE ENCOUNTER
Patient states Dr. Barriga had given her omeprazole for her stomach issues.  This has not helped and wants to see Dr. Barriga.  I put her in for an appointment today.

## 2020-09-22 LAB
ALBUMIN SERPL-MCNC: 4.9 G/DL (ref 3.5–5.2)
ALBUMIN/GLOB SERPL: 2.9 G/DL
ALP SERPL-CCNC: 74 U/L (ref 39–117)
ALT SERPL-CCNC: 17 U/L (ref 1–33)
AST SERPL-CCNC: 19 U/L (ref 1–32)
BASOPHILS # BLD AUTO: 0.06 10*3/MM3 (ref 0–0.2)
BASOPHILS NFR BLD AUTO: 1.1 % (ref 0–1.5)
BILIRUB SERPL-MCNC: 0.6 MG/DL (ref 0–1.2)
BUN SERPL-MCNC: 15 MG/DL (ref 8–23)
BUN/CREAT SERPL: 19.2 (ref 7–25)
CALCIUM SERPL-MCNC: 9.5 MG/DL (ref 8.6–10.5)
CHLORIDE SERPL-SCNC: 102 MMOL/L (ref 98–107)
CHOLEST SERPL-MCNC: 159 MG/DL (ref 0–200)
CO2 SERPL-SCNC: 27.9 MMOL/L (ref 22–29)
CREAT SERPL-MCNC: 0.78 MG/DL (ref 0.57–1)
EOSINOPHIL # BLD AUTO: 0.1 10*3/MM3 (ref 0–0.4)
EOSINOPHIL NFR BLD AUTO: 1.9 % (ref 0.3–6.2)
ERYTHROCYTE [DISTWIDTH] IN BLOOD BY AUTOMATED COUNT: 12.2 % (ref 12.3–15.4)
GLOBULIN SER CALC-MCNC: 1.7 GM/DL
GLUCOSE SERPL-MCNC: 97 MG/DL (ref 65–99)
HCT VFR BLD AUTO: 39.8 % (ref 34–46.6)
HDLC SERPL-MCNC: 63 MG/DL (ref 40–60)
HGB BLD-MCNC: 13 G/DL (ref 12–15.9)
IMM GRANULOCYTES # BLD AUTO: 0.02 10*3/MM3 (ref 0–0.05)
IMM GRANULOCYTES NFR BLD AUTO: 0.4 % (ref 0–0.5)
LDLC SERPL CALC-MCNC: 77 MG/DL (ref 0–100)
LYMPHOCYTES # BLD AUTO: 1.63 10*3/MM3 (ref 0.7–3.1)
LYMPHOCYTES NFR BLD AUTO: 30.9 % (ref 19.6–45.3)
MCH RBC QN AUTO: 29.7 PG (ref 26.6–33)
MCHC RBC AUTO-ENTMCNC: 32.7 G/DL (ref 31.5–35.7)
MCV RBC AUTO: 90.9 FL (ref 79–97)
MONOCYTES # BLD AUTO: 0.54 10*3/MM3 (ref 0.1–0.9)
MONOCYTES NFR BLD AUTO: 10.2 % (ref 5–12)
NEUTROPHILS # BLD AUTO: 2.92 10*3/MM3 (ref 1.7–7)
NEUTROPHILS NFR BLD AUTO: 55.5 % (ref 42.7–76)
NRBC BLD AUTO-RTO: 0 /100 WBC (ref 0–0.2)
PLATELET # BLD AUTO: 206 10*3/MM3 (ref 140–450)
POTASSIUM SERPL-SCNC: 4.4 MMOL/L (ref 3.5–5.2)
PROT SERPL-MCNC: 6.6 G/DL (ref 6–8.5)
RBC # BLD AUTO: 4.38 10*6/MM3 (ref 3.77–5.28)
SODIUM SERPL-SCNC: 140 MMOL/L (ref 136–145)
TRIGL SERPL-MCNC: 94 MG/DL (ref 0–150)
TSH SERPL DL<=0.005 MIU/L-ACNC: 1.76 UIU/ML (ref 0.27–4.2)
VLDLC SERPL CALC-MCNC: 18.8 MG/DL
WBC # BLD AUTO: 5.27 10*3/MM3 (ref 3.4–10.8)

## 2020-09-22 RX ORDER — ATORVASTATIN CALCIUM 10 MG/1
TABLET, FILM COATED ORAL
Qty: 90 TABLET | Refills: 1 | Status: SHIPPED | OUTPATIENT
Start: 2020-09-22 | End: 2021-02-19

## 2020-09-23 ENCOUNTER — HOSPITAL ENCOUNTER (OUTPATIENT)
Dept: GENERAL RADIOLOGY | Facility: HOSPITAL | Age: 80
Discharge: HOME | End: 2020-09-23
Admitting: INTERNAL MEDICINE

## 2020-09-23 DIAGNOSIS — R10.13 EPIGASTRIC PAIN: ICD-10-CM

## 2020-09-23 PROCEDURE — 74022 RADEX COMPL AQT ABD SERIES: CPT

## 2020-09-27 ENCOUNTER — APPOINTMENT (OUTPATIENT)
Dept: PREADMISSION TESTING | Facility: HOSPITAL | Age: 80
End: 2020-09-27

## 2020-09-27 LAB
REF LAB TEST METHOD: NORMAL
SARS-COV-2 RNA NOSE QL NAA+PROBE: NOT DETECTED
SARS-COV-2 RNA RESP QL NAA+PROBE: NORMAL

## 2020-09-27 PROCEDURE — U0004 COV-19 TEST NON-CDC HGH THRU: HCPCS

## 2020-09-27 PROCEDURE — C9803 HOPD COVID-19 SPEC COLLECT: HCPCS

## 2020-09-29 ENCOUNTER — OUTSIDE FACILITY SERVICE (OUTPATIENT)
Dept: GASTROENTEROLOGY | Facility: CLINIC | Age: 80
End: 2020-09-29

## 2020-09-29 PROCEDURE — 88305 TISSUE EXAM BY PATHOLOGIST: CPT | Performed by: INTERNAL MEDICINE

## 2020-09-29 PROCEDURE — G0500 MOD SEDAT ENDO SERVICE >5YRS: HCPCS | Performed by: INTERNAL MEDICINE

## 2020-09-29 PROCEDURE — 43239 EGD BIOPSY SINGLE/MULTIPLE: CPT | Performed by: INTERNAL MEDICINE

## 2020-09-30 ENCOUNTER — LAB REQUISITION (OUTPATIENT)
Dept: LAB | Facility: HOSPITAL | Age: 80
End: 2020-09-30

## 2020-09-30 DIAGNOSIS — R10.10 UPPER ABDOMINAL PAIN, UNSPECIFIED: ICD-10-CM

## 2020-10-02 ENCOUNTER — TELEPHONE (OUTPATIENT)
Dept: GASTROENTEROLOGY | Facility: CLINIC | Age: 80
End: 2020-10-02

## 2020-10-02 NOTE — TELEPHONE ENCOUNTER
----- Message from Melo Sparks MD sent at 10/1/2020  3:16 PM EDT -----  Please let Joselyn know that she does not have H. pylori.  She also does not have Elkins's esophagus.  Dr. Sparks   Dr. Yuen the patients wife 

## 2020-10-26 ENCOUNTER — TELEPHONE (OUTPATIENT)
Dept: INTERNAL MEDICINE | Facility: CLINIC | Age: 80
End: 2020-10-26

## 2020-10-26 DIAGNOSIS — F51.01 PRIMARY INSOMNIA: ICD-10-CM

## 2020-10-26 RX ORDER — ZOLPIDEM TARTRATE 10 MG/1
10 TABLET ORAL NIGHTLY PRN
Qty: 30 TABLET | Refills: 2 | Status: SHIPPED | OUTPATIENT
Start: 2020-10-26 | End: 2021-02-18 | Stop reason: SDUPTHER

## 2020-10-26 NOTE — TELEPHONE ENCOUNTER
Caller: Joselyn Segura    Relationship: Self    Best call back number: [phone]     Medication needed:   Requested Prescriptions     Pending Prescriptions Disp Refills   • zolpidem (AMBIEN) 10 MG tablet 30 tablet 2     Sig: Take 1 tablet by mouth At Night As Needed for Sleep. Pt tried and failed belsomra and trazodone       When do you need the refill by: 10/30/2020    What details did the patient provide when requesting the medication:     Does the patient have less than a 3 day supply:  [] Yes  [x] No    What is the patient's [facility] [place] [address] [facility] [phone] [facility] [phone] FX

## 2020-11-20 NOTE — PROGRESS NOTES
Subjective:     Encounter Date:11/23/2020    Patient ID: Joselyn Segura is a 80 y.o.  white female from [place], [place], retired PE/health teacher.     REFERRING PROVIDER: ILIANA Snyder  PHYSICIAN: Nohelia Barriga MD  GYN ONCOLOGIST: Samara Goodman MD  ORTHOPEDIC SURGEON: Sachin Merino MD    Chief Complaint:   Chief Complaint   Patient presents with   • Rapid Heart Rate   • Intermittent Palpitations   • Essential Hypertension       Problem List:  1. Palpitations/nonsustained ventricular tachycardia:  a. Normal stress echo after EKG with T wave inversions in V2 V3 December 2012   b. MCOT: 14 beat run of ventricular tachycardia, otherwise no arrhythmias, average heart rate 67 bpm, minimum 42 bpm and maximum 132 bpm, occasional PVCs (1%) with no atrial fibrillation or SVT  c. Echocardiogram 9/6/2019: Cardiac chamber sizes and wall thickness is normal.  Global and segmental LV wall motion normal.  LVEF 70%.  Trileaflet aortic valve exhibits normal function.  The valve is mildly sclerotic.  There is mitral annular calcification with mild MR.  There is probably prolapse of the anterior mitral leaflet.  Pulmonary hypertension not suggested.  Agitated saline with Valsalva is weakly positive after 4-5 beats suggesting a possible pulmonary AVM.  d. Cardiolite GXT, 9/6/2019: Negative for ischemia by clinical, electrocardiographic and scintigraphic criteria; overall low risk study for future cardiac events; LVEF 0.66  e. Residual class I symptoms with normal EKG, October 2019  f. Residual class I symptoms, November 2020  2. Hypertension  3. Hyperlipidemia; on statin therapy  4. GERD  5. 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 Left carotid bruit with carotid duplex normal 2000, carotid duplex August 2013 demonstrating mild bilateral 0-29% stenosis  7. Osteopenia  8. Insomnia  9. Former tobacco use; 0.3 packs/day x 16 years, quit 1974  10. Surgical history:  a. Laparoscopic  surgery for endometriosis  b. Basal cell carcinoma excision/Mohs surgery  c. Left ACL reconstruction  d. Right knee arthroscopy  e. 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   • Trazodone Irritability   • Premarin [Conjugated Estrogens] Other (See Comments)     Not sure   • Sulfa Antibiotics Rash       Current Outpatient Medications:   •  aspirin 81 MG EC tablet, Take 243 mg by mouth Daily., Disp: , Rfl:   •  atorvastatin (LIPITOR) 10 MG tablet, TAKE ONE TABLET BY MOUTH DAILY (Patient taking differently: Take 10 mg by mouth 4 (Four) Times a Week.), Disp: 90 tablet, Rfl: 1  •  GLUCOSAMINE-CHONDROITIN PO, Take 1 tablet by mouth 3 (Three) Times a Week., Disp: , Rfl:   •  metoprolol succinate XL (TOPROL-XL) 50 MG 24 hr tablet, Take 1 tablet by mouth Daily., Disp: 90 tablet, Rfl: 1  •  olmesartan (BENICAR) 20 MG tablet, Take 20 mg by mouth 4 (Four) Times a Week., Disp: , Rfl:   •  Omega-3 Fatty Acids (FISH OIL) 1000 MG capsule capsule, Take  by mouth Daily With Breakfast., Disp: , Rfl:   •  omeprazole (priLOSEC) 40 MG capsule, Take 1 capsule by mouth Daily. (Patient taking differently: Take 20 mg by mouth Daily.), Disp: 30 capsule, Rfl: 1  •  Probiotic Product (PROBIOTIC DAILY PO), Take  by mouth Daily., Disp: , Rfl:   •  zolpidem (AMBIEN) 10 MG tablet, Take 1 tablet by mouth At Night As Needed for Sleep. Pt tried and failed belsomra and trazodone, Disp: 30 tablet, Rfl: 2    History of Present Illness: Patient returns for follow up after an almost 14-month hiatus. Patient was last seen in consult on 10/03/2019. Patient was a no show to her appointment on 11/19/2020. Since last visit, patient has been doing well overall and has been sheltering in place. She played a lot of golf and tennis this past summer. She has continued to play tennis  "inside once a week. She is without cardiopulmonary complaints with exertion. Her recent laboratory studies have been acceptable (see below). She also had a recent colonoscopy and endoscopy which were acceptable (see below). She notes during the summer after physical activity, her blood pressure seemed to become low. She has questions regarding if taking Claritin-D could have caused her palpitations. She has had no interim ER visits, hospitalizations, serious illnesses, or surgeries. Patient otherwise denies chest pain, shortness of breath, PND, edema, palpitations, syncope, or presyncope at this time.       ROS   Obtained and negative except as outlined in problem list and HPI.    Procedures       Objective:       Vitals:    11/23/20 1459 11/23/20 1504   BP: 132/78 126/74   BP Location: Left arm Left arm   Patient Position: Sitting Standing   Pulse: 51 66   Temp: 97.8 °F (36.6 °C)    SpO2: 98%    Weight: 50.3 kg (111 lb)    Height: 163.8 cm (64.5\")      Body mass index is 18.76 kg/m².  Last weight: 110 lbs    Vitals signs reviewed.   Constitutional:       Appearance: Well-developed.   Neck:      Thyroid: No thyromegaly.      Vascular: No carotid bruit or JVD.      Lymphadenopathy: No cervical adenopathy.   Pulmonary:      Effort: Pulmonary effort is normal.      Breath sounds: Normal breath sounds. No wheezing. No rhonchi. No rales.   Cardiovascular:      Regular rhythm.      Murmurs: There is a grade 1/6 mid frequency early systolic murmur at the LLSB.      No gallop. No S3 gallop.   Pulses:     Dorsalis pedis: 2+ bilaterally.     Posterior tibial: 2+ bilaterally.  Edema:     Peripheral edema absent.   Abdominal:      Palpations: Abdomen is soft. There is no abdominal mass.      Tenderness: There is no abdominal tenderness. There is no guarding.   Musculoskeletal: Normal range of motion.   Skin:     General: Skin is warm and dry.      Findings: No rash.   Neurological:      Mental Status: Alert and oriented to " person, place, and time.           Lab Review:   Lab Results   Component Value Date    GLU 97 09/21/2020    BUN 15 09/21/2020    CREATININE 0.78 09/21/2020    EGFRIFNONA 71 09/21/2020    EGFRIFAFRI 86 09/21/2020    BCR 19.2 09/21/2020     09/21/2020    K 4.4 09/21/2020     09/21/2020    CO2 27.9 09/21/2020    CALCIUM 9.5 09/21/2020    PROTENTOTREF 6.6 09/21/2020    ALBUMIN 4.90 09/21/2020    LABIL2 2.9 09/21/2020    AST 19 09/21/2020    ALT 17 09/21/2020       Lab Results   Component Value Date    WBC 5.27 09/21/2020    HGB 13.0 09/21/2020    HCT 39.8 09/21/2020    MCV 90.9 09/21/2020     09/21/2020       Lab Results   Component Value Date    TSH 1.760 09/21/2020       Lab Results   Component Value Date    CHLPL 159 09/21/2020    CHLPL 160 02/05/2020    CHLPL 165 07/29/2019     Lab Results   Component Value Date    TRIG 94 09/21/2020    TRIG 77 02/05/2020    TRIG 70 07/29/2019     Lab Results   Component Value Date    HDL 63 (H) 09/21/2020    HDL 70 (H) 02/05/2020    HDL 71 (H) 07/29/2019     Lab Results   Component Value Date    LDL 77 09/21/2020    LDL 75 02/05/2020    LDL 80 07/29/2019     Colonoscopy, 08/06/2020:  IMPRESSIONS:  Diverticulosis of large intestine  Benign neoplasm of sigmoid colon     XR abdomen, 09/23/2020:  IMPRESSION:  No acute cardiopulmonary process. Nonspecific nonobstructive  bowel gas pattern of the abdomen with moderate colonic stool burden  throughout. No gross intraperitoneal free air.     Endoscopy, 09/29/2020:  IMPRESSIONS  Diaphragmatic hernia without obstruction or gangrene  GERD        Assessment:       Overall continued acceptable course with no new interim cardiopulmonary complaints with good functional status. We will defer additional diagnostic or therapeutic intervention from a cardiac perspective at this time.     Diagnosis Plan   1. Intermittent palpitations  Stable clinical course; Continue current treatment   2. Essential hypertension  Acceptable control;  Continue current treatment   3. Dyslipidemia  Overall acceptable FLP, September 2020; continue atorvastatin          Plan:         1. Patient to continue current medications and close follow up with the above providers.  2. Tentative cardiology follow up in November 2021 or patient may return sooner PRN.    Scribed for Steven Mcbride MD by Varsha Diane. 11/23/2020  15:17 EST     I, Steven Mcbride MD, [facility], personally performed the services described in this documentation as scribed by the above named individual in my presence, and it is both accurate and complete. At 14:55 EST on 11/23/2020

## 2020-11-23 ENCOUNTER — OFFICE VISIT (OUTPATIENT)
Dept: CARDIOLOGY | Facility: CLINIC | Age: 80
End: 2020-11-23

## 2020-11-23 VITALS
HEART RATE: 66 BPM | TEMPERATURE: 97.8 F | WEIGHT: 111 LBS | OXYGEN SATURATION: 98 % | SYSTOLIC BLOOD PRESSURE: 126 MMHG | BODY MASS INDEX: 18.49 KG/M2 | HEIGHT: 65 IN | DIASTOLIC BLOOD PRESSURE: 74 MMHG

## 2020-11-23 DIAGNOSIS — E78.5 DYSLIPIDEMIA: ICD-10-CM

## 2020-11-23 DIAGNOSIS — R00.2 INTERMITTENT PALPITATIONS: Primary | ICD-10-CM

## 2020-11-23 DIAGNOSIS — I10 ESSENTIAL HYPERTENSION: ICD-10-CM

## 2020-11-23 PROCEDURE — 99214 OFFICE O/P EST MOD 30 MIN: CPT | Performed by: INTERNAL MEDICINE

## 2020-11-23 RX ORDER — OLMESARTAN MEDOXOMIL 20 MG/1
20 TABLET ORAL
Qty: 90 TABLET | Refills: 3 | Status: SHIPPED | OUTPATIENT
Start: 2020-11-24 | End: 2021-06-10

## 2021-01-04 ENCOUNTER — OFFICE VISIT (OUTPATIENT)
Dept: INTERNAL MEDICINE | Facility: CLINIC | Age: 81
End: 2021-01-04

## 2021-01-04 VITALS
DIASTOLIC BLOOD PRESSURE: 62 MMHG | TEMPERATURE: 97.7 F | HEIGHT: 64 IN | HEART RATE: 57 BPM | BODY MASS INDEX: 18.61 KG/M2 | OXYGEN SATURATION: 97 % | WEIGHT: 109 LBS | SYSTOLIC BLOOD PRESSURE: 120 MMHG

## 2021-01-04 DIAGNOSIS — E78.00 PURE HYPERCHOLESTEROLEMIA: Chronic | ICD-10-CM

## 2021-01-04 DIAGNOSIS — F51.01 PRIMARY INSOMNIA: Chronic | ICD-10-CM

## 2021-01-04 DIAGNOSIS — R10.11 ABDOMINAL PAIN, BILATERAL UPPER QUADRANT: Primary | ICD-10-CM

## 2021-01-04 DIAGNOSIS — R10.12 ABDOMINAL PAIN, BILATERAL UPPER QUADRANT: Primary | ICD-10-CM

## 2021-01-04 PROCEDURE — 99214 OFFICE O/P EST MOD 30 MIN: CPT | Performed by: INTERNAL MEDICINE

## 2021-01-04 RX ORDER — OMEPRAZOLE 20 MG/1
20 CAPSULE, DELAYED RELEASE ORAL DAILY
COMMUNITY
End: 2021-10-04

## 2021-01-04 NOTE — PROGRESS NOTES
"Chief Complaint  Abdominal Pain (every since the colonoscopy she has had abdominal pain.  she is still having the pain even after taking the omeprazole 40 mg.)    Subjective          Joselyn Segura presents to [facility] for   History of Present Illness    Abdominal pain intermittently since she had her EGD/colonoscopy 5months ago. We did do abdominal XR afterwards which was normal except for constipation.   Feels it in the upper abdomen but then will feel some pain down into her lower abdomen. crampy feeling. Not severe, maybe 5/10  She took omeprazole 40, then switched over to the 20mg when she ran out of that, but not sure if it helps.  She is taking a plexus probiotic so  Has been taking for a few weeks. Helps w/ her BMs but not sure if it is helping w/ the pain.   Having a BM sometimes several a day w/ the probiotic but not every day generally,and was taking a Mg supplement prior to the probiotic  She is not sure if having a BM relieves the pain, maybe a little  A little nausea, had some vomiting last year but not so much now.   Her  had a stroke so has been a stressful time and she wonders if the stress is a trigger for the pain  Not worse w/ food, usually relieved w/ food    Insomnia - on ambien which helps, no side effects    hyperlipidmeia - on lipitor and has tolerated    Objective   Vital Signs:   /62 (BP Location: Left arm, Patient Position: Sitting)   Pulse 57   Temp 97.7 °F (36.5 °C) (Infrared)   Ht 162.6 cm (64\")   Wt 49.4 kg (109 lb)   SpO2 97%   BMI 18.71 kg/m²     Physical Exam   Constitutional: oriented to person, place, and time.  well-developed and well-nourished. No distress.   HENT:   Head: Normocephalic and atraumatic.   Eyes: Conjunctivae and EOM are normal.   Cardiovascular: Normal rate, regular rhythm and normal heart sounds.  Exam reveals no gallop and no friction rub.    No murmur heard.  Pulmonary/Chest: Effort normal and breath " sounds normal. No respiratory distress.   no wheezes.   abd - tender over epigastrum. No masses appreciated. No rebound  Neurological:  alert and oriented to person, place, and time.   Skin: Skin is warm and dry. not diaphoretic.   Psychiatric:  normal mood and affect. behavior is normal. Judgment and thought content normal.   Result Review :   The following data was reviewed by: Nohelia Barriga MD on 01/04/2021:  CMP    CMP 2/5/20 9/21/20   Glucose 90 97   BUN 18 15   Creatinine 0.84 0.78   eGFR Non  Am 65 71   eGFR African Am 79 86   Sodium 141 140   Potassium 4.4 4.4   Chloride 102 102   Calcium 10.7 (A) 9.5   Total Protein 6.6 6.6   Albumin 4.60 4.90   Globulin 2.0 1.7   Total Bilirubin 0.6 0.6   Alkaline Phosphatase 75 74   AST (SGOT) 22 19   ALT (SGPT) 19 17   (A) Abnormal value       Comments are available for some flowsheets but are not being displayed.           CBC    CBC 2/5/20 9/21/20   WBC 4.99 5.27   RBC 4.37 4.38   Hemoglobin 12.9 13.0   Hematocrit 38.3 39.8   MCV 87.6 90.9   MCH 29.5 29.7   MCHC 33.7 32.7   RDW 12.2 (A) 12.2 (A)   Platelets 222 206   (A) Abnormal value            Data reviewed: GI studies egd          Assessment and Plan {CC Problem List  Visit Diagnosis  ROS  Review (Popup)  Health Maintenance  Quality  BestPractice  Medications  SmartSets  SnapShot Encounters  Media :23}   Problem List Items Addressed This Visit        Cardiac and Vasculature    Pure hypercholesterolemia- we will recheck at next visit       Sleep    Primary insomnia- stable, on ambien long term      Other Visit Diagnoses     Abdominal pain, bilateral upper quadrant    -  Primary- worsening problem. We will go ahead w/ CT scan. Ok to continue probiotic since helping w/ her BMs    Relevant Orders    CT Abdomen Pelvis With Contrast          Follow Up   No follow-ups on file.  Patient was given instructions and counseling regarding her condition or for health maintenance advice. Please see specific  information pulled into the AVS if appropriate.

## 2021-01-15 ENCOUNTER — HOSPITAL ENCOUNTER (OUTPATIENT)
Dept: CT IMAGING | Facility: HOSPITAL | Age: 81
Discharge: HOME | End: 2021-01-15
Admitting: INTERNAL MEDICINE

## 2021-01-15 DIAGNOSIS — R10.12 ABDOMINAL PAIN, BILATERAL UPPER QUADRANT: ICD-10-CM

## 2021-01-15 DIAGNOSIS — R10.11 ABDOMINAL PAIN, BILATERAL UPPER QUADRANT: ICD-10-CM

## 2021-01-15 LAB — CREAT BLDA-MCNC: 0.9 MG/DL (ref 0.6–1.3)

## 2021-01-15 PROCEDURE — 82565 ASSAY OF CREATININE: CPT

## 2021-01-15 PROCEDURE — 74177 CT ABD & PELVIS W/CONTRAST: CPT

## 2021-01-15 PROCEDURE — 25010000002 IOPAMIDOL 61 % SOLUTION: Performed by: INTERNAL MEDICINE

## 2021-01-15 RX ADMIN — IOPAMIDOL 85 ML: 612 INJECTION, SOLUTION INTRAVENOUS at 16:00

## 2021-02-18 DIAGNOSIS — F51.01 PRIMARY INSOMNIA: ICD-10-CM

## 2021-02-19 RX ORDER — ATORVASTATIN CALCIUM 10 MG/1
10 TABLET, FILM COATED ORAL DAILY
Qty: 90 TABLET | Refills: 1 | Status: SHIPPED | OUTPATIENT
Start: 2021-02-19 | End: 2021-10-04 | Stop reason: SDUPTHER

## 2021-02-19 RX ORDER — ZOLPIDEM TARTRATE 10 MG/1
10 TABLET ORAL NIGHTLY PRN
Qty: 30 TABLET | Refills: 1 | Status: SHIPPED | OUTPATIENT
Start: 2021-02-19 | End: 2021-04-08 | Stop reason: SDUPTHER

## 2021-03-08 RX ORDER — METOPROLOL SUCCINATE 50 MG/1
50 TABLET, EXTENDED RELEASE ORAL DAILY
Qty: 90 TABLET | Refills: 1 | Status: SHIPPED | OUTPATIENT
Start: 2021-03-08 | End: 2021-09-07

## 2021-04-03 NOTE — PROGRESS NOTES
History of Present Illness     Here for medicare wellness exam and physical. No hospitalizations in last year and pt feels health is stable    Living will- pt has  Exercise - regularly. Walks, tennis, trampoline  Diet - fairly healthy. Takes bioflex fish oil, ca, coq10  Falls-none, balance good  Depression - phq-2 - 0  Memory - name recall a little difficult.   specialists: GI - [facility]  -derm-  Dr Sandra  foot - Dr zavala. Eye - Drayer. Derm- Jocy     Insomnia - she is taking 1/2-1 ambien every night, and doing well overall w/ her sleep. She does tolerate and does not feel hung over the next day. no ETOH generally except occasionally a beer after she plays golf     HTN - she is taking metoprolol 25 1 qd and BPs have been good     Gastritis/GERD - using prilosec about every other day and fairly well controlled.   She had EGD/colon done in '20 and had some intermittent upper abdominal pain since then. Overall she does feel it has been better the last several months.   We did get a CT scan in 1/21 and showed mild/moderate constipation but otherwise was normal  Better then it was, but still has to take a laxative regularly usually miralax daily, and also some benefiber, and also will use senna as needed-last time was about a week ago so not very often    Sinus trouble over the last 2-3 weeks - pain in maxillary sinuses and into back of head. She has tried claritin-D a few times- helped a little. Some nasal congestion, but uses a hot compress which helps. No fever  Has also tried some ibuprofen - helped some w/ HA. Has some flonase at home but has not used  Eyes itchy but not bad and no sneezing or cough    Hyperlipidemia-she is taking Lipitor regularly.  Did eat just a banana and a few crackers today    Current Outpatient Medications on File Prior to Visit   Medication Sig Dispense Refill   • aspirin 81 MG EC tablet Take 243 mg by mouth Daily.     • atorvastatin (LIPITOR) 10 MG tablet Take 1 tablet by mouth Daily. 90  tablet 1   • GLUCOSAMINE-CHONDROITIN PO Take 1 tablet by mouth 3 (Three) Times a Week.     • metoprolol succinate XL (TOPROL-XL) 50 MG 24 hr tablet Take 1 tablet by mouth Daily. 90 tablet 1   • Omega-3 Fatty Acids (FISH OIL) 1000 MG capsule capsule Take  by mouth Daily With Breakfast.     • omeprazole (priLOSEC) 20 MG capsule Take 20 mg by mouth Daily.     • Probiotic Product (PROBIOTIC DAILY PO) Take  by mouth Daily.     • zolpidem (AMBIEN) 10 MG tablet Take 1 tablet by mouth At Night As Needed for Sleep. Pt tried and failed belsomra and trazodone 30 tablet 1   • NON FORMULARY 1 each Daily. Plexus vitalBiome     • olmesartan (BENICAR) 20 MG tablet Take 1 tablet by mouth 4 (Four) Times a Week. 90 tablet 3   • [DISCONTINUED] omeprazole (priLOSEC) 40 MG capsule Take 1 capsule by mouth Daily. (Patient taking differently: Take 20 mg by mouth Daily.) 30 capsule 1     No current facility-administered medications on file prior to visit.       The following portions of the patient's history were reviewed and updated as appropriate: allergies, current medications, past family history, past medical history, past social history, past surgical history and problem list.    Review of Systems   Constitutional: Negative for activity change, appetite change, fever, unexpected weight gain and unexpected weight loss.   HENT: Negative.    Eyes: Negative.    Respiratory: Negative for shortness of breath and wheezing.    Cardiovascular: Negative for chest pain, palpitations and leg swelling.   Gastrointestinal: Positive for abdominal pain and constipation.   Endocrine: Negative.    Genitourinary: Negative for difficulty urinating and dysuria.   Skin: Negative.    Allergic/Immunologic: Negative for immunocompromised state.   Neurological: Negative for seizures, speech difficulty, memory problem and confusion.   Hematological: Does not bruise/bleed easily.   Psychiatric/Behavioral: Positive for sleep disturbance. Negative for agitation.          Objective    Vitals:    04/08/21 1259   BP: 124/66   Pulse: 60   Temp: 98.4 °F (36.9 °C)   SpO2: 98%     Physical Exam   Physical Exam  Vitals and nursing note reviewed.   Constitutional:       General: She is not in acute distress.     Appearance: She is well-developed. She is not diaphoretic.   HENT:      Head: Normocephalic and atraumatic.      Right Ear: External ear normal.      Left Ear: External ear normal.      Nose: Nose normal.      Mouth/Throat:      Pharynx: No oropharyngeal exudate.   Eyes:      General: No scleral icterus.        Right eye: No discharge.         Left eye: No discharge.      Conjunctiva/sclera: Conjunctivae normal.      Pupils: Pupils are equal, round, and reactive to light.   Neck:      Thyroid: No thyromegaly.   Cardiovascular:      Rate and Rhythm: Normal rate and regular rhythm.      Heart sounds: Normal heart sounds. No murmur heard.   No friction rub. No gallop.    Pulmonary:      Effort: Pulmonary effort is normal. No respiratory distress.      Breath sounds: Normal breath sounds. No wheezing or rales.   Chest:      Breasts:         Right: No mass, nipple discharge, skin change or tenderness.         Left: No mass, nipple discharge, skin change or tenderness.   Abdominal:      General: Bowel sounds are normal. There is no distension.      Palpations: Abdomen is soft. There is no mass.      Tenderness: There is abdominal tenderness (mild epigastric tenderness). There is no guarding or rebound.   Musculoskeletal:         General: No deformity. Normal range of motion.      Cervical back: Normal range of motion and neck supple.   Lymphadenopathy:      Cervical: No cervical adenopathy.   Skin:     General: Skin is warm and dry.      Coloration: Skin is not pale.      Findings: No erythema or rash.   Neurological:      Mental Status: She is alert and oriented to person, place, and time.      Coordination: Coordination normal.      Deep Tendon Reflexes: Reflexes normal.    Psychiatric:         Behavior: Behavior normal.         Thought Content: Thought content normal.         Judgment: Judgment normal.            Assessment/Plan   Diagnoses and all orders for this visit:    1. Encounter for annual wellness exam in Medicare patient (Primary)  Regular exercise/healthy diet. BSE q month. Sunscreen use encouraged. caclium intake reviewed.  Living will - pt has- bring copy here  Madhuri due now - [facility]- she will schedule-has number  Colon due 9/22  Pneumovax - had in '10 - booster today  prevnar- had  Had hep A already  DEXA due 5/21 (osteopenia)-we will schedule  Shingles - shingrix discussed -  can check at pharmacy since we do not have   covid vaccine - she had  Does not need paps since age 65 or older and has had normal paps in past    2. Routine general medical examination at a health care facility  -     CBC (No Diff); Future  -     TSH Rfx On Abnormal To Free T4; Future  -     Lipid Panel; Future  -     Comprehensive Metabolic Panel; Future  -     Vitamin D 25 Hydroxy; Future  -     Cancel: POC Urinalysis Dipstick, Automated  -     Vitamin D 25 Hydroxy  -     Comprehensive Metabolic Panel  -     Lipid Panel  -     TSH Rfx On Abnormal To Free T4  -     CBC (No Diff)    3. Primary insomnia-stable on Ambien.  Pt has already signed controlled substance contract. Evans done today and appropriate.     -     zolpidem (AMBIEN) 10 MG tablet; Take 1 tablet by mouth At Night As Needed for Sleep. Pt tried and failed belsomra and trazodone  Dispense: 30 tablet; Refill: 2    4. Pure hypercholesterolemia-on Lipitor, check labs today  -     TSH Rfx On Abnormal To Free T4; Future  -     Lipid Panel; Future  -     Comprehensive Metabolic Panel; Future  -     Comprehensive Metabolic Panel  -     Lipid Panel  -     TSH Rfx On Abnormal To Free T4    5. Essential hypertension-good control  -     CBC (No Diff); Future  -     CBC (No Diff)    6. NSVT (nonsustained ventricular tachycardia) (CMS/HCC)-no  recurrences    7. Vitamin D deficiency-check today-     Vitamin D 25 Hydroxy; Future  -     Vitamin D 25 Hydroxy    8. Chronic constipation  Comments:  we discussed ok taking the miralax and benefiber regualrly, and use senna just occasionally. we discussed seeing GI but she wants to wait and see how she does    9. Seasonal allergic rhinitis due to pollen-we will have her try Flonase daily and can use Tylenol as needed for the headache.  Call if no better.    10. Need for pneumococcal vaccination  -     Pneumococcal Polysaccharide Vaccine 23-Valent Greater Than or Equal To 3yo Subcutaneous / IM

## 2021-04-08 ENCOUNTER — OFFICE VISIT (OUTPATIENT)
Dept: INTERNAL MEDICINE | Facility: CLINIC | Age: 81
End: 2021-04-08

## 2021-04-08 VITALS
WEIGHT: 109.2 LBS | TEMPERATURE: 98.4 F | OXYGEN SATURATION: 98 % | DIASTOLIC BLOOD PRESSURE: 66 MMHG | HEART RATE: 60 BPM | HEIGHT: 64 IN | SYSTOLIC BLOOD PRESSURE: 124 MMHG | BODY MASS INDEX: 18.64 KG/M2

## 2021-04-08 DIAGNOSIS — Z23 NEED FOR PNEUMOCOCCAL VACCINATION: ICD-10-CM

## 2021-04-08 DIAGNOSIS — I10 ESSENTIAL HYPERTENSION: ICD-10-CM

## 2021-04-08 DIAGNOSIS — J30.1 SEASONAL ALLERGIC RHINITIS DUE TO POLLEN: ICD-10-CM

## 2021-04-08 DIAGNOSIS — Z00.00 ROUTINE GENERAL MEDICAL EXAMINATION AT A HEALTH CARE FACILITY: ICD-10-CM

## 2021-04-08 DIAGNOSIS — E78.00 PURE HYPERCHOLESTEROLEMIA: ICD-10-CM

## 2021-04-08 DIAGNOSIS — K59.09 CHRONIC CONSTIPATION: ICD-10-CM

## 2021-04-08 DIAGNOSIS — Z78.0 POSTMENOPAUSAL: ICD-10-CM

## 2021-04-08 DIAGNOSIS — E55.9 VITAMIN D DEFICIENCY: ICD-10-CM

## 2021-04-08 DIAGNOSIS — F51.01 PRIMARY INSOMNIA: ICD-10-CM

## 2021-04-08 DIAGNOSIS — Z00.00 ENCOUNTER FOR ANNUAL WELLNESS EXAM IN MEDICARE PATIENT: Primary | ICD-10-CM

## 2021-04-08 DIAGNOSIS — I47.29 NSVT (NONSUSTAINED VENTRICULAR TACHYCARDIA) (HCC): ICD-10-CM

## 2021-04-08 PROCEDURE — 1170F FXNL STATUS ASSESSED: CPT | Performed by: INTERNAL MEDICINE

## 2021-04-08 PROCEDURE — 96160 PT-FOCUSED HLTH RISK ASSMT: CPT | Performed by: INTERNAL MEDICINE

## 2021-04-08 PROCEDURE — 90732 PPSV23 VACC 2 YRS+ SUBQ/IM: CPT | Performed by: INTERNAL MEDICINE

## 2021-04-08 PROCEDURE — G0439 PPPS, SUBSEQ VISIT: HCPCS | Performed by: INTERNAL MEDICINE

## 2021-04-08 PROCEDURE — G0009 ADMIN PNEUMOCOCCAL VACCINE: HCPCS | Performed by: INTERNAL MEDICINE

## 2021-04-08 PROCEDURE — 1159F MED LIST DOCD IN RCRD: CPT | Performed by: INTERNAL MEDICINE

## 2021-04-08 PROCEDURE — 99397 PER PM REEVAL EST PAT 65+ YR: CPT | Performed by: INTERNAL MEDICINE

## 2021-04-08 RX ORDER — ZOLPIDEM TARTRATE 10 MG/1
10 TABLET ORAL NIGHTLY PRN
Qty: 30 TABLET | Refills: 2 | Status: SHIPPED | OUTPATIENT
Start: 2021-04-08 | End: 2021-09-07

## 2021-04-09 LAB
25(OH)D3+25(OH)D2 SERPL-MCNC: 49.9 NG/ML (ref 30–100)
ALBUMIN SERPL-MCNC: 4.5 G/DL (ref 3.6–4.6)
ALBUMIN/GLOB SERPL: 1.8 {RATIO} (ref 1.2–2.2)
ALP SERPL-CCNC: 86 IU/L (ref 39–117)
ALT SERPL-CCNC: 18 IU/L (ref 0–32)
AST SERPL-CCNC: 23 IU/L (ref 0–40)
BILIRUB SERPL-MCNC: 0.5 MG/DL (ref 0–1.2)
BUN SERPL-MCNC: 15 MG/DL (ref 8–27)
BUN/CREAT SERPL: 16 (ref 12–28)
CALCIUM SERPL-MCNC: 10 MG/DL (ref 8.7–10.3)
CHLORIDE SERPL-SCNC: 104 MMOL/L (ref 96–106)
CHOLEST SERPL-MCNC: 169 MG/DL (ref 100–199)
CO2 SERPL-SCNC: 26 MMOL/L (ref 20–29)
CREAT SERPL-MCNC: 0.92 MG/DL (ref 0.57–1)
ERYTHROCYTE [DISTWIDTH] IN BLOOD BY AUTOMATED COUNT: 11.9 % (ref 11.7–15.4)
GLOBULIN SER CALC-MCNC: 2.5 G/DL (ref 1.5–4.5)
GLUCOSE SERPL-MCNC: 92 MG/DL (ref 65–99)
HCT VFR BLD AUTO: 39.6 % (ref 34–46.6)
HDLC SERPL-MCNC: 76 MG/DL
HGB BLD-MCNC: 13.2 G/DL (ref 11.1–15.9)
LDLC SERPL CALC-MCNC: 77 MG/DL (ref 0–99)
MCH RBC QN AUTO: 29.5 PG (ref 26.6–33)
MCHC RBC AUTO-ENTMCNC: 33.3 G/DL (ref 31.5–35.7)
MCV RBC AUTO: 89 FL (ref 79–97)
PLATELET # BLD AUTO: 245 X10E3/UL (ref 150–450)
POTASSIUM SERPL-SCNC: 4.4 MMOL/L (ref 3.5–5.2)
PROT SERPL-MCNC: 7 G/DL (ref 6–8.5)
RBC # BLD AUTO: 4.47 X10E6/UL (ref 3.77–5.28)
SODIUM SERPL-SCNC: 143 MMOL/L (ref 134–144)
TRIGL SERPL-MCNC: 85 MG/DL (ref 0–149)
TSH SERPL DL<=0.005 MIU/L-ACNC: 1.37 UIU/ML (ref 0.45–4.5)
VLDLC SERPL CALC-MCNC: 16 MG/DL (ref 5–40)
WBC # BLD AUTO: 5.1 X10E3/UL (ref 3.4–10.8)

## 2021-06-10 ENCOUNTER — OFFICE VISIT (OUTPATIENT)
Dept: INTERNAL MEDICINE | Facility: CLINIC | Age: 81
End: 2021-06-10

## 2021-06-10 VITALS
WEIGHT: 111.8 LBS | BODY MASS INDEX: 19.09 KG/M2 | OXYGEN SATURATION: 98 % | SYSTOLIC BLOOD PRESSURE: 104 MMHG | HEART RATE: 54 BPM | DIASTOLIC BLOOD PRESSURE: 64 MMHG | TEMPERATURE: 98 F | HEIGHT: 64 IN

## 2021-06-10 DIAGNOSIS — R51.9 HEADACHE, OCCIPITAL: Primary | ICD-10-CM

## 2021-06-10 DIAGNOSIS — M54.2 NECK PAIN ON RIGHT SIDE: ICD-10-CM

## 2021-06-10 PROCEDURE — 99214 OFFICE O/P EST MOD 30 MIN: CPT | Performed by: INTERNAL MEDICINE

## 2021-06-10 RX ORDER — TIZANIDINE 2 MG/1
2 TABLET ORAL EVERY 8 HOURS PRN
Qty: 30 TABLET | Refills: 0 | Status: SHIPPED | OUTPATIENT
Start: 2021-06-10 | End: 2021-12-02

## 2021-06-10 RX ORDER — PREDNISONE 10 MG/1
TABLET ORAL
Qty: 10 TABLET | Refills: 0 | Status: SHIPPED | OUTPATIENT
Start: 2021-06-10 | End: 2021-12-02

## 2021-06-10 NOTE — PROGRESS NOTES
Chief Complaint  Headache (2-3 weeks ago had a headache in the back of her head, makes her sick to her stomach)    Subjective          Joselyn Segura presents to [facility]  History of Present Illness    Headache for the last 2-3 weeks across the back of her head.  Aching pain, not sharp.  Initially was more intermittent but has been fairly constant the last several days.  Initially was just in the back but the last several days feels it radiate into her right side of her neck.  No pain down the arms but has noticed some slight tingling sensations in right arm to fifth finger on and off.  Neck feels a little bit stiff when she turns it and does hear the neck cracking some.  Did have neck problem 30 to 40 years ago where she had to be hospitalized and was in traction.  Never had to have any surgery on her cervical spine though, and no recent problems.  No injury.  No dizziness.  No confusion or weakness  Does feel nauseated and vomited once.  Had some sinus issues earlier this spring and had sinus pain but has not felt any sinus pain or allergy symptoms except for today.  No fever   She has continued her usual activities and played tennis, but did have to lay down one day because she felt bad.  She has been trying tyelnol 1 tablet or 1 tablet of ibuprofen per day- may help a little  Pain level is 5-6/10 in severity    Constipation - still having some trouble going regularly.      Current Outpatient Medications:   •  aspirin 81 MG EC tablet, Take 243 mg by mouth Daily., Disp: , Rfl:   •  atorvastatin (LIPITOR) 10 MG tablet, Take 1 tablet by mouth Daily., Disp: 90 tablet, Rfl: 1  •  GLUCOSAMINE-CHONDROITIN PO, Take 1 tablet by mouth 3 (Three) Times a Week., Disp: , Rfl:   •  metoprolol succinate XL (TOPROL-XL) 50 MG 24 hr tablet, Take 1 tablet by mouth Daily., Disp: 90 tablet, Rfl: 1  •  Omega-3 Fatty Acids (FISH OIL) 1000 MG capsule capsule, Take  by mouth Daily With Breakfast., Disp: ,  "Rfl:   •  omeprazole (priLOSEC) 20 MG capsule, Take 20 mg by mouth Daily., Disp: , Rfl:   •  Probiotic Product (PROBIOTIC DAILY PO), Take  by mouth Daily., Disp: , Rfl:   •  zolpidem (AMBIEN) 10 MG tablet, Take 1 tablet by mouth At Night As Needed for Sleep. Pt tried and failed belsomra and trazodone, Disp: 30 tablet, Rfl: 2  •  predniSONE (DELTASONE) 10 MG tablet, 4 qd x 1 days, then 3qd x 1 days, then 2qd x 1d, then 1 qd x 1 d then d/c, Disp: 10 tablet, Rfl: 0  •  tiZANidine (ZANAFLEX) 2 MG tablet, Take 1 tablet by mouth Every 8 (Eight) Hours As Needed (neck pain)., Disp: 30 tablet, Rfl: 0      Objective   Vital Signs:   /64 (BP Location: Left arm, Patient Position: Sitting)   Pulse 54   Temp 98 °F (36.7 °C) (Infrared)   Ht 162.6 cm (64\")   Wt 50.7 kg (111 lb 12.8 oz)   SpO2 98%   BMI 19.19 kg/m²       Physical exam  Constitutional: oriented to person, place, and time.  well-developed and well-nourished. No distress.   HENT:   Head: Normocephalic and atraumatic.   Eyes: Conjunctivae and EOM are normal.  PERRLA.  No nystagmus  Cardiovascular: Normal rate, regular rhythm and normal heart sounds.  Exam reveals no gallop and no friction rub.    No murmur heard.  Pulmonary/Chest: Effort normal and breath sounds normal. No respiratory distress.   no wheezes.   Neurological:  alert and oriented to person, place, and time.  No focal weakness.  DTR symmetric bilateral upper and lower extremities.  Skin: Skin is warm and dry. not diaphoretic.   MS: Slight tenderness over cervical spine and right trap.  Neck range of motion is normal though some crepitus  Psychiatric:  normal mood and affect. behavior is normal. Judgment and thought content normal.      Result Review :   The following data was reviewed by: Nohelia Barriga MD on 06/10/2021:  Common labs    Common Labsle 9/21/20 9/21/20 9/21/20 1/15/21 4/8/21 4/8/21 4/8/21    1227 1227 1227  1405 1405 1405   Glucose  97   92     BUN  15   15     Creatinine  0.78  " 0.90 0.92     eGFR Non  Am  71   59 (A)     eGFR  Am  86   68     Sodium  140   143     Potassium  4.4   4.4     Chloride  102   104     Calcium  9.5   10.0     Total Protein  6.6   7.0     Albumin  4.90   4.5     Total Bilirubin  0.6   0.5     Alkaline Phosphatase  74   86     AST (SGOT)  19   23     ALT (SGPT)  17   18     WBC   5.27    5.1   Hemoglobin   13.0    13.2   Hematocrit   39.8    39.6   Platelets   206    245   Total Cholesterol 159     169    Triglycerides 94     85    HDL Cholesterol 63 (A)     76    LDL Cholesterol  77     77    (A) Abnormal value       Comments are available for some flowsheets but are not being displayed.                       Assessment and Plan    Diagnoses and all orders for this visit:    1. Headache, occipital (Primary)    2. Neck pain on right side    Other orders  -     predniSONE (DELTASONE) 10 MG tablet; 4 qd x 1 days, then 3qd x 1 days, then 2qd x 1d, then 1 qd x 1 d then d/c  Dispense: 10 tablet; Refill: 0  -     tiZANidine (ZANAFLEX) 2 MG tablet; Take 1 tablet by mouth Every 8 (Eight) Hours As Needed (neck pain).  Dispense: 30 tablet; Refill: 0      Headache is likely from neck degenerative changes.  We discussed imaging with an MRI since this is a new onset headache but patient prefers to try some medication first and see how she does.  Her neurological exam is reassuring today.  We will try a short course of prednisone and a muscle relaxer as needed.  Side effects of both reviewed.  She will let me know next week how she is doing.  If symptoms have worsened, we can get MRI of head and cervical neck x-ray.        Follow Up   No follow-ups on file.  Patient was given instructions and counseling regarding her condition or for health maintenance advice. Please see specific information pulled into the AVS if appropriate.

## 2021-07-08 ENCOUNTER — APPOINTMENT (OUTPATIENT)
Dept: BONE DENSITY | Facility: HOSPITAL | Age: 81
End: 2021-07-08

## 2021-09-05 DIAGNOSIS — F51.01 PRIMARY INSOMNIA: ICD-10-CM

## 2021-09-07 RX ORDER — METOPROLOL SUCCINATE 50 MG/1
TABLET, EXTENDED RELEASE ORAL
Qty: 90 TABLET | Refills: 1 | Status: SHIPPED | OUTPATIENT
Start: 2021-09-07 | End: 2022-04-14 | Stop reason: SDUPTHER

## 2021-09-07 RX ORDER — ZOLPIDEM TARTRATE 10 MG/1
TABLET ORAL
Qty: 30 TABLET | Refills: 0 | Status: SHIPPED | OUTPATIENT
Start: 2021-09-07 | End: 2021-10-04 | Stop reason: SDUPTHER

## 2021-09-07 NOTE — TELEPHONE ENCOUNTER
Last seen 6/10. Next appointment 10/4.  Last filled metoprolol on 3/8 for 90 day with 1 refill.  Last filled Ambien on 4/8 with 2 refills. [facility] [place] 4/8/2021.  No UDS

## 2021-09-27 ENCOUNTER — APPOINTMENT (OUTPATIENT)
Dept: BONE DENSITY | Facility: HOSPITAL | Age: 81
End: 2021-09-27

## 2021-10-04 ENCOUNTER — OFFICE VISIT (OUTPATIENT)
Dept: INTERNAL MEDICINE | Facility: CLINIC | Age: 81
End: 2021-10-04

## 2021-10-04 ENCOUNTER — HOSPITAL ENCOUNTER (OUTPATIENT)
Dept: GENERAL RADIOLOGY | Facility: HOSPITAL | Age: 81
Discharge: HOME | End: 2021-10-04

## 2021-10-04 ENCOUNTER — LAB (OUTPATIENT)
Dept: LAB | Facility: HOSPITAL | Age: 81
End: 2021-10-04

## 2021-10-04 VITALS
HEART RATE: 56 BPM | HEIGHT: 64 IN | OXYGEN SATURATION: 100 % | SYSTOLIC BLOOD PRESSURE: 110 MMHG | WEIGHT: 113 LBS | TEMPERATURE: 97.5 F | BODY MASS INDEX: 19.29 KG/M2 | DIASTOLIC BLOOD PRESSURE: 66 MMHG

## 2021-10-04 DIAGNOSIS — M79.642 LEFT HAND PAIN: ICD-10-CM

## 2021-10-04 DIAGNOSIS — K59.09 CHRONIC CONSTIPATION: ICD-10-CM

## 2021-10-04 DIAGNOSIS — R05.9 COUGH: Primary | ICD-10-CM

## 2021-10-04 DIAGNOSIS — K21.9 GASTROESOPHAGEAL REFLUX DISEASE WITHOUT ESOPHAGITIS: ICD-10-CM

## 2021-10-04 DIAGNOSIS — R10.11 RUQ PAIN: ICD-10-CM

## 2021-10-04 DIAGNOSIS — I10 ESSENTIAL HYPERTENSION: ICD-10-CM

## 2021-10-04 DIAGNOSIS — E78.00 PURE HYPERCHOLESTEROLEMIA: ICD-10-CM

## 2021-10-04 DIAGNOSIS — F51.01 PRIMARY INSOMNIA: ICD-10-CM

## 2021-10-04 DIAGNOSIS — Z23 NEED FOR INFLUENZA VACCINATION: ICD-10-CM

## 2021-10-04 LAB
ALBUMIN SERPL-MCNC: 5 G/DL (ref 3.5–5.2)
ALBUMIN/GLOB SERPL: 2.8 G/DL
ALP SERPL-CCNC: 77 U/L (ref 39–117)
ALT SERPL-CCNC: 16 U/L (ref 1–33)
AST SERPL-CCNC: 21 U/L (ref 1–32)
BASOPHILS # BLD AUTO: 0.05 10*3/MM3 (ref 0–0.2)
BASOPHILS NFR BLD AUTO: 1 % (ref 0–1.5)
BILIRUB SERPL-MCNC: 0.8 MG/DL (ref 0–1.2)
BUN SERPL-MCNC: 19 MG/DL (ref 8–23)
BUN/CREAT SERPL: 23.5 (ref 7–25)
CALCIUM SERPL-MCNC: 10.2 MG/DL (ref 8.6–10.5)
CHLORIDE SERPL-SCNC: 99 MMOL/L (ref 98–107)
CHOLEST SERPL-MCNC: 179 MG/DL (ref 0–200)
CO2 SERPL-SCNC: 27.7 MMOL/L (ref 22–29)
CREAT SERPL-MCNC: 0.81 MG/DL (ref 0.57–1)
EOSINOPHIL # BLD AUTO: 0.09 10*3/MM3 (ref 0–0.4)
EOSINOPHIL NFR BLD AUTO: 1.8 % (ref 0.3–6.2)
ERYTHROCYTE [DISTWIDTH] IN BLOOD BY AUTOMATED COUNT: 12 % (ref 12.3–15.4)
GLOBULIN SER CALC-MCNC: 1.8 GM/DL
GLUCOSE SERPL-MCNC: 93 MG/DL (ref 65–99)
HCT VFR BLD AUTO: 38.9 % (ref 34–46.6)
HDLC SERPL-MCNC: 74 MG/DL (ref 40–60)
HGB BLD-MCNC: 13 G/DL (ref 12–15.9)
IMM GRANULOCYTES # BLD AUTO: 0.01 10*3/MM3 (ref 0–0.05)
IMM GRANULOCYTES NFR BLD AUTO: 0.2 % (ref 0–0.5)
LDLC SERPL CALC-MCNC: 90 MG/DL (ref 0–100)
LYMPHOCYTES # BLD AUTO: 1.62 10*3/MM3 (ref 0.7–3.1)
LYMPHOCYTES NFR BLD AUTO: 33.1 % (ref 19.6–45.3)
MCH RBC QN AUTO: 29.4 PG (ref 26.6–33)
MCHC RBC AUTO-ENTMCNC: 33.4 G/DL (ref 31.5–35.7)
MCV RBC AUTO: 88 FL (ref 79–97)
MONOCYTES # BLD AUTO: 0.63 10*3/MM3 (ref 0.1–0.9)
MONOCYTES NFR BLD AUTO: 12.9 % (ref 5–12)
NEUTROPHILS # BLD AUTO: 2.5 10*3/MM3 (ref 1.7–7)
NEUTROPHILS NFR BLD AUTO: 51 % (ref 42.7–76)
NRBC BLD AUTO-RTO: 0 /100 WBC (ref 0–0.2)
PLATELET # BLD AUTO: 234 10*3/MM3 (ref 140–450)
POTASSIUM SERPL-SCNC: 3.9 MMOL/L (ref 3.5–5.2)
PROT SERPL-MCNC: 6.8 G/DL (ref 6–8.5)
RBC # BLD AUTO: 4.42 10*6/MM3 (ref 3.77–5.28)
SODIUM SERPL-SCNC: 138 MMOL/L (ref 136–145)
TRIGL SERPL-MCNC: 83 MG/DL (ref 0–150)
VLDLC SERPL CALC-MCNC: 15 MG/DL (ref 5–40)
WBC # BLD AUTO: 4.9 10*3/MM3 (ref 3.4–10.8)

## 2021-10-04 PROCEDURE — 99215 OFFICE O/P EST HI 40 MIN: CPT | Performed by: INTERNAL MEDICINE

## 2021-10-04 PROCEDURE — 90662 IIV NO PRSV INCREASED AG IM: CPT | Performed by: INTERNAL MEDICINE

## 2021-10-04 PROCEDURE — G0008 ADMIN INFLUENZA VIRUS VAC: HCPCS | Performed by: INTERNAL MEDICINE

## 2021-10-04 PROCEDURE — 73130 X-RAY EXAM OF HAND: CPT

## 2021-10-04 RX ORDER — ATORVASTATIN CALCIUM 10 MG/1
10 TABLET, FILM COATED ORAL DAILY
Qty: 90 TABLET | Refills: 1 | Status: SHIPPED | OUTPATIENT
Start: 2021-10-04 | End: 2022-04-14 | Stop reason: SDUPTHER

## 2021-10-04 RX ORDER — ESOMEPRAZOLE MAGNESIUM 40 MG/1
40 CAPSULE, DELAYED RELEASE ORAL
Qty: 30 CAPSULE | Refills: 11 | Status: SHIPPED | OUTPATIENT
Start: 2021-10-04 | End: 2022-01-06

## 2021-10-04 RX ORDER — ZOLPIDEM TARTRATE 10 MG/1
10 TABLET ORAL NIGHTLY PRN
Qty: 30 TABLET | Refills: 2 | Status: SHIPPED | OUTPATIENT
Start: 2021-10-04 | End: 2022-01-06 | Stop reason: SDUPTHER

## 2021-10-04 NOTE — PROGRESS NOTES
Chief Complaint  Insomnia, Med Refill, Constipation (still having issues with stomach issues), Nausea, Cough (she has had a bad cough for a month), Upper Extremity Issue (car door hit her left hand and wants to have checked out), and Hyperlipidemia    Subjective          Joselyn Segura presents to [facility]  History of Present Illness    Insomnia-patient is on Ambien 10 and this is working well.  She filled this last a month ago    Cough - she has had for 1 month. Dry cough and has not felt sick, no fever, no chest congestion. Some allergy symptoms - sneezing and can have nasal congestion alternating with runny nose at times.  She has not tried any over-the-counter's for the cough yet.  It does not seem associated with meals or at night.    GERD-generally well controlled-she is taking prilosec most days. Sometimes will take a pepcid instead.  She has felt  some nausea at times over the last month, and occasionally some RUQ pain. Rarely will feel reflux    Headache/neck pain-when patient was here 4 months ago, she had had several weeks of pain across the back of her head.  We tried steroids and a muscle relaxer and discussed imaging. this is better and she is trying to do some neck exercises    Constipation-still struggling- has a BM 2-3x/week. She is using Mg daily, and a probiotic. occasionally will use miralax but doesn't like to use because of the hassle. Usually does work fairly well  She tried a Citrucel but seemed to work too well - had too many bowel movements after taking.  We had done a CT scan in 1/21 which was normal other than scattered stool  She had colonoscopy in 9/20 which was essentially normal    Hyperlipidemia-patient is on Lipitor 10.  Last FLP was 4/21. She is fasting today    Left hand pain- 2 1/2 weeks ago the car door hit the top of hand.  Patient had a significant swelling afterwards and there has continued to be a large knot on the dorsum of her hand.  A few  "days after it happened, it did drain some blood and a small amount of yellow drainage-has not had any drainage in the last 10 to 14 days though.  Hurts if she makes a .  She has been able to still play tennis and golf though   Current Outpatient Medications:   •  atorvastatin (LIPITOR) 10 MG tablet, Take 1 tablet by mouth Daily., Disp: 90 tablet, Rfl: 1  •  GLUCOSAMINE-CHONDROITIN PO, Take 1 tablet by mouth 3 (Three) Times a Week., Disp: , Rfl:   •  metoprolol succinate XL (TOPROL-XL) 50 MG 24 hr tablet, TAKE ONE TABLET BY MOUTH DAILY, Disp: 90 tablet, Rfl: 1  •  Omega-3 Fatty Acids (FISH OIL) 1000 MG capsule capsule, Take  by mouth Daily With Breakfast., Disp: , Rfl:   •  Probiotic Product (PROBIOTIC DAILY PO), Take  by mouth Daily., Disp: , Rfl:   •  tiZANidine (ZANAFLEX) 2 MG tablet, Take 1 tablet by mouth Every 8 (Eight) Hours As Needed (neck pain)., Disp: 30 tablet, Rfl: 0  •  zolpidem (AMBIEN) 10 MG tablet, Take 1 tablet by mouth At Night As Needed for Sleep., Disp: 30 tablet, Rfl: 2  •  aspirin 81 MG EC tablet, Take 243 mg by mouth Daily., Disp: , Rfl:   •  esomeprazole (nexIUM) 40 MG capsule, Take 1 capsule by mouth Every Morning Before Breakfast., Disp: 30 capsule, Rfl: 11  •  linaclotide (Linzess) 145 MCG capsule capsule, Take 1 capsule by mouth Every Morning Before Breakfast., Disp: 30 capsule, Rfl: 5  •  predniSONE (DELTASONE) 10 MG tablet, 4 qd x 1 days, then 3qd x 1 days, then 2qd x 1d, then 1 qd x 1 d then d/c, Disp: 10 tablet, Rfl: 0      Objective   Vital Signs:   /66 (BP Location: Right arm, Patient Position: Sitting)   Pulse 56   Temp 97.5 °F (36.4 °C) (Infrared)   Ht 162.6 cm (64\")   Wt 51.3 kg (113 lb)   SpO2 100%   BMI 19.40 kg/m²       Physical exam  Constitutional: oriented to person, place, and time.  well-developed and well-nourished. No distress.   HENT:   Head: Normocephalic and atraumatic.   Eyes: Conjunctivae and EOM are normal.   OP: Throat-no erythema or " drainage  Cardiovascular: Normal rate, regular rhythm and normal heart sounds.  Exam reveals no gallop and no friction rub.    No murmur heard.  Pulmonary/Chest: Effort normal and breath sounds normal. No respiratory distress.   no wheezes.   Abdomen: Soft, nondistended.  Mostly epigastric and left upper quadrant tenderness today  Neurological:  alert and oriented to person, place, and time.   Skin: Skin is warm and dry. not diaphoretic.   MS: Left dorsum of the hand with large hematoma approximately 3 cm wide-slight tenderness over it.  No drainage.  Some tenderness in second MCP and radial wrist on left  Psychiatric:  normal mood and affect. behavior is normal. Judgment and thought content normal.      Result Review :   The following data was reviewed by: Nohelia Barriga MD on 10/04/2021:  CMP    CMP 1/15/21 4/8/21   Glucose  92   BUN  15   Creatinine 0.90 0.92   eGFR Non  Am  59 (A)   eGFR African Am  68   Sodium  143   Potassium  4.4   Chloride  104   Calcium  10.0   Total Protein  7.0   Albumin  4.5   Globulin  2.5   Total Bilirubin  0.5   Alkaline Phosphatase  86   AST (SGOT)  23   ALT (SGPT)  18   (A) Abnormal value       Comments are available for some flowsheets but are not being displayed.           CBC    CBC 4/8/21   WBC 5.1   RBC 4.47   Hemoglobin 13.2   Hematocrit 39.6   MCV 89   MCH 29.5   MCHC 33.3   RDW 11.9   Platelets 245           Lipid Panel    Lipid Panel 4/8/21   Total Cholesterol 169   Triglycerides 85   HDL Cholesterol 76   VLDL Cholesterol 16   LDL Cholesterol  77           TSH    TSH 4/8/21   TSH 1.370                       Assessment and Plan    Diagnoses and all orders for this visit:    1. Cough (Primary)-likely from GERD or allergies.  We will have her start Claritin without the decongestant    2. Pure hypercholesterolemia-check levels today  -     Comprehensive Metabolic Panel; Future  -     Lipid Panel; Future  -     CBC & Differential; Future  -     CBC & Differential  -      Lipid Panel  -     Comprehensive Metabolic Panel    3. Essential hypertension-good control    4. Gastroesophageal reflux disease without esophagitis-we will switch the Prilosec to Nexium and see if we have improvement in her cough and nausea    5. RUQ pain-check ultrasound  -     US Gallbladder; Future    6. Left hand pain-large hematoma on dorsum of the hand.  Discussed warm compresses.  We will also go ahead and get an x-ray.  -     XR Hand 3+ View Left; Future    7. Primary insomnia-well-controlled with Ambien.  Refill today.  She has signed controlled substance contract and Evans is appropriate  -     zolpidem (AMBIEN) 10 MG tablet; Take 1 tablet by mouth At Night As Needed for Sleep.  Dispense: 30 tablet; Refill: 2    8.  Chronic constipation-patient has been struggling over the last few years with this.  She has tried various over-the-counter's including MiraLAX and fiber and magnesium but no major improvement.  We will try Linzess.      9.  Need for influenza vaccination  -     Fluzone High-Dose 65+yrs ([id number])    Other orders  -     atorvastatin (LIPITOR) 10 MG tablet; Take 1 tablet by mouth Daily.  Dispense: 90 tablet; Refill: 1  -     linaclotide (Linzess) 145 MCG capsule capsule; Take 1 capsule by mouth Every Morning Before Breakfast.  Dispense: 30 capsule; Refill: 5  -     esomeprazole (nexIUM) 40 MG capsule; Take 1 capsule by mouth Every Morning Before Breakfast.  Dispense: 30 capsule; Refill: 11      I spent 40 minutes caring for Joselyn on this date of service. This time includes time spent by me in the following activities:preparing for the visit, reviewing tests, obtaining and/or reviewing a separately obtained history, performing a medically appropriate examination and/or evaluation , counseling and educating the patient/family/caregiver, ordering medications, tests, or procedures, referring and communicating with other health care professionals  and documenting information in the medical  record  Follow Up   Return in about 3 months (around 1/4/2022) for Next scheduled follow up.  Patient was given instructions and counseling regarding her condition or for health maintenance advice. Please see specific information pulled into the AVS if appropriate.

## 2021-10-14 ENCOUNTER — HOSPITAL ENCOUNTER (OUTPATIENT)
Dept: ULTRASOUND IMAGING | Facility: HOSPITAL | Age: 81
Discharge: HOME | End: 2021-10-14
Admitting: INTERNAL MEDICINE

## 2021-10-14 DIAGNOSIS — R10.11 RUQ PAIN: ICD-10-CM

## 2021-10-14 PROCEDURE — 76705 ECHO EXAM OF ABDOMEN: CPT

## 2021-12-02 ENCOUNTER — OFFICE VISIT (OUTPATIENT)
Dept: CARDIOLOGY | Facility: CLINIC | Age: 81
End: 2021-12-02

## 2021-12-02 VITALS
WEIGHT: 108.8 LBS | BODY MASS INDEX: 18.57 KG/M2 | DIASTOLIC BLOOD PRESSURE: 62 MMHG | HEART RATE: 66 BPM | SYSTOLIC BLOOD PRESSURE: 118 MMHG | OXYGEN SATURATION: 99 % | HEIGHT: 64 IN

## 2021-12-02 DIAGNOSIS — R00.2 INTERMITTENT PALPITATIONS: Primary | ICD-10-CM

## 2021-12-02 DIAGNOSIS — I10 ESSENTIAL HYPERTENSION: ICD-10-CM

## 2021-12-02 DIAGNOSIS — E78.5 DYSLIPIDEMIA: ICD-10-CM

## 2021-12-02 PROCEDURE — 99213 OFFICE O/P EST LOW 20 MIN: CPT | Performed by: INTERNAL MEDICINE

## 2021-12-02 NOTE — PROGRESS NOTES
Subjective:     Encounter Date:12/02/2021    Patient ID: Joselyn Segura is a 81 y.o.  white female from [place], [place], retired PE/health teacher.     REFERRING PROVIDER: ILIANA Snyder  PHYSICIAN: Nohelia Barriga MD  GYN ONCOLOGIST: Samara Goodman MD  ORTHOPEDIC SURGEON: Sachin Merino MD    Chief Complaint:   Chief Complaint   Patient presents with   • Intermittent palpitations       Problem List:  1. Palpitations/nonsustained ventricular tachycardia:  a. Normal stress echo after EKG with T wave inversions in V2 V3 December 2012   b. MCOT: 14 beat run of ventricular tachycardia, otherwise no arrhythmias, average heart rate 67 bpm, minimum 42 bpm and maximum 132 bpm, occasional PVCs (1%) with no atrial fibrillation or SVT  c. Echocardiogram 9/6/2019: Cardiac chamber sizes and wall thickness is normal.  Global and segmental LV wall motion normal.  LVEF 70%.  Trileaflet aortic valve exhibits normal function.  The valve is mildly sclerotic.  There is mitral annular calcification with mild MR.  There is probably prolapse of the anterior mitral leaflet.  Pulmonary hypertension not suggested.  Agitated saline with Valsalva is weakly positive after 4-5 beats suggesting a possible pulmonary AVM.  d. Cardiolite GXT, 9/6/2019: Negative for ischemia by clinical, electrocardiographic and scintigraphic criteria; overall low risk study for future cardiac events; LVEF 0.66  e. Residual class I symptoms with normal EKG, October 2019  f. Residual class I symptoms, November 2020, December 2021  2. Hypertension  3. Hyperlipidemia; on statin therapy  4. GERD  5. 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 Left carotid bruit with carotid duplex normal 2000, carotid duplex August 2013 demonstrating mild bilateral 0-29% stenosis  7. Osteopenia  8. Insomnia  9. Former tobacco use; 0.3 packs/day x 16 years, quit 1974  10. Surgical history:  a. Laparoscopic surgery for endometriosis  b. Basal  cell carcinoma excision/Mohs surgery  c. Left ACL reconstruction  d. Right knee arthroscopy  e. 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   • Trazodone Irritability   • Premarin [Conjugated Estrogens] Other (See Comments)     Not sure   • Sulfa Antibiotics Rash       Current Outpatient Medications:   •  aspirin 81 MG EC tablet, Take 243 mg by mouth Daily., Disp: , Rfl:   •  atorvastatin (LIPITOR) 10 MG tablet, Take 1 tablet by mouth Daily., Disp: 90 tablet, Rfl: 1  •  esomeprazole (nexIUM) 40 MG capsule, Take 1 capsule by mouth Every Morning Before Breakfast., Disp: 30 capsule, Rfl: 11  •  GLUCOSAMINE-CHONDROITIN PO, Take 1 tablet by mouth 3 (Three) Times a Week., Disp: , Rfl:   •  linaclotide (Linzess) 145 MCG capsule capsule, Take 1 capsule by mouth Every Morning Before Breakfast., Disp: 30 capsule, Rfl: 5  •  metoprolol succinate XL (TOPROL-XL) 50 MG 24 hr tablet, TAKE ONE TABLET BY MOUTH DAILY, Disp: 90 tablet, Rfl: 1  •  Omega-3 Fatty Acids (FISH OIL) 1000 MG capsule capsule, Take  by mouth Daily With Breakfast., Disp: , Rfl:   •  Probiotic Product (PROBIOTIC DAILY PO), Take  by mouth Daily., Disp: , Rfl:   •  zolpidem (AMBIEN) 10 MG tablet, Take 1 tablet by mouth At Night As Needed for Sleep., Disp: 30 tablet, Rfl: 2    History of Present Illness: Patient returns for scheduled 13-month follow up. She has been feeling well overall from a cardiovascular standpoint. Patient denies chest pain, shortness of breath, palpitations, edema, dizziness, and syncope. She has had no interim ER visits, hospitalizations, serious illnesses, or surgeries. She plays golf regularly. She is active and asymptomatic on a regular basis. She has received COVID immunization.      ROS   Obtained and negative except as outlined in problem list and  "HPI.    Procedures       Objective:       Vitals:    12/02/21 1428 12/02/21 1430   BP: 130/68 118/62   BP Location: Left arm Left arm   Patient Position: Sitting Standing   Pulse: 66 66   SpO2: 99% 99%   Weight: 49.4 kg (108 lb 12.8 oz) 49.4 kg (108 lb 12.8 oz)   Height: 162.6 cm (64\") 162.6 cm (64\")     Body mass index is 18.68 kg/m².  Last weight: 111 lbs    Vitals reviewed.   Constitutional:       Appearance: Well-developed.   Neck:      Thyroid: No thyromegaly.      Vascular: No carotid bruit or JVD.      Lymphadenopathy: No cervical adenopathy.   Pulmonary:      Effort: Pulmonary effort is normal.      Breath sounds: Normal breath sounds. No wheezing. No rhonchi. No rales.   Cardiovascular:      Regular rhythm.      No gallop. No S3 gallop.   Pulses:     Dorsalis pedis: 2+ bilaterally.     Posterior tibial: 2+ bilaterally.  Edema:     Peripheral edema absent.   Abdominal:      Palpations: Abdomen is soft. There is no abdominal mass.      Tenderness: There is no abdominal tenderness.   Musculoskeletal: Normal range of motion. Skin:     General: Skin is warm and dry.      Findings: No rash.   Neurological:      Mental Status: Alert and oriented to person, place, and time.           Lab Review:   Lab Results   Component Value Date    GLUCOSE 93 10/04/2021    BUN 19 10/04/2021    CREATININE 0.81 10/04/2021    EGFRIFNONA 68 10/04/2021    EGFRIFAFRI 82 10/04/2021    BCR 23.5 10/04/2021     10/04/2021    K 3.9 10/04/2021    CL 99 10/04/2021    CO2 27.7 10/04/2021    CALCIUM 10.2 10/04/2021    PROTENTOTREF 6.8 10/04/2021    ALBUMIN 5.00 10/04/2021    LABIL2 2.8 10/04/2021    AST 21 10/04/2021    ALT 16 10/04/2021       Lab Results   Component Value Date    WBC 4.90 10/04/2021    HGB 13.0 10/04/2021    HCT 38.9 10/04/2021    MCV 88.0 10/04/2021     10/04/2021       Lab Results   Component Value Date    TSH 1.370 04/08/2021       Lab Results   Component Value Date    CHLPL 179 10/04/2021    CHLPL 169 " 04/08/2021    CHLPL 159 09/21/2020     Lab Results   Component Value Date    TRIG 83 10/04/2021    TRIG 85 04/08/2021    TRIG 94 09/21/2020     Lab Results   Component Value Date    HDL 74 (H) 10/04/2021    HDL 76 04/08/2021    HDL 63 (H) 09/21/2020     Lab Results   Component Value Date    LDL 90 10/04/2021    LDL 77 04/08/2021    LDL 77 09/21/2020     XR Left Hand, 10/4/2021:  No acute displaced fracture with soft tissue edema along the dorsum of the metacarpals. 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    US Gallbladder, 10/14/2021:  Normal right upper quadrant ultrasound.        Assessment:       Overall continued acceptable course with no new interim cardiopulmonary complaints with good functional status. We will defer additional diagnostic or therapeutic intervention from a cardiac perspective at this time.     Diagnosis Plan   1. Intermittent palpitations  Stable and largely asymptomatic.   2. Essential hypertension  Well controlled. Continue current treatment.   3. Dyslipidemia  Well controlled. Continue atorvastatin.          Plan:         1. Patient to continue current medications and close follow up with the above providers.  2. Tentative cardiology follow up in December 2022 or patient may return sooner PRN.    I, Saroj Elliott, attest that this documentation has been prepared under the direction and in the presence of Steven Mcbride MD 12/02/2021    ISteven MD, [facility], personally performed the services described in this documentation as scribed by the above named individual in my presence, and it is both accurate and complete. At 14:45 EST on 12/02/2021

## 2021-12-03 ENCOUNTER — HOSPITAL ENCOUNTER (OUTPATIENT)
Dept: BONE DENSITY | Facility: HOSPITAL | Age: 81
Discharge: HOME | End: 2021-12-03
Admitting: INTERNAL MEDICINE

## 2021-12-03 DIAGNOSIS — Z78.0 POSTMENOPAUSAL: ICD-10-CM

## 2021-12-03 PROCEDURE — 77080 DXA BONE DENSITY AXIAL: CPT

## 2022-01-06 ENCOUNTER — OFFICE VISIT (OUTPATIENT)
Dept: INTERNAL MEDICINE | Facility: CLINIC | Age: 82
End: 2022-01-06

## 2022-01-06 VITALS
BODY MASS INDEX: 19.6 KG/M2 | HEIGHT: 64 IN | HEART RATE: 63 BPM | OXYGEN SATURATION: 100 % | WEIGHT: 114.8 LBS | DIASTOLIC BLOOD PRESSURE: 68 MMHG | SYSTOLIC BLOOD PRESSURE: 118 MMHG | TEMPERATURE: 97.3 F

## 2022-01-06 DIAGNOSIS — K21.9 GASTROESOPHAGEAL REFLUX DISEASE WITHOUT ESOPHAGITIS: ICD-10-CM

## 2022-01-06 DIAGNOSIS — E78.00 PURE HYPERCHOLESTEROLEMIA: Primary | Chronic | ICD-10-CM

## 2022-01-06 DIAGNOSIS — K59.09 CHRONIC CONSTIPATION: Chronic | ICD-10-CM

## 2022-01-06 DIAGNOSIS — F51.01 PRIMARY INSOMNIA: Chronic | ICD-10-CM

## 2022-01-06 PROCEDURE — 99214 OFFICE O/P EST MOD 30 MIN: CPT | Performed by: INTERNAL MEDICINE

## 2022-01-06 RX ORDER — ZOLPIDEM TARTRATE 10 MG/1
10 TABLET ORAL NIGHTLY PRN
Qty: 30 TABLET | Refills: 2 | Status: SHIPPED | OUTPATIENT
Start: 2022-01-06 | End: 2022-04-14 | Stop reason: SDUPTHER

## 2022-01-06 RX ORDER — OMEPRAZOLE 20 MG/1
CAPSULE, DELAYED RELEASE ORAL
Qty: 60 CAPSULE | Refills: 11 | Status: SHIPPED | OUTPATIENT
Start: 2022-01-06

## 2022-01-06 RX ORDER — ONDANSETRON 4 MG/1
4 TABLET, ORALLY DISINTEGRATING ORAL EVERY 8 HOURS PRN
Qty: 15 TABLET | Refills: 1 | Status: SHIPPED | OUTPATIENT
Start: 2022-01-06 | End: 2022-12-06

## 2022-01-06 NOTE — PROGRESS NOTES
Chief Complaint  Insomnia (f/u), Hyperlipidemia (f/u), and Heartburn (discuss stomach issue)    Subjective          Joselyn Segura presents to [facility]  History of Present Illness    Insomnia-patient is on Ambien 10 and this is working fairly well, though not as well as it used to    Hyperlipidemia-she is on Lipitor.  Last FLP was 10/21    Chronic constipation-at last visit 3 months ago we wrote for Linzess 145. She did fill it and took it briefly (maybe just a few doses)but felt like it caused too much stool, so stopped. She is doing probiotic and activia right now, and going every 2-3 days and feels better, not as much nausea. Appetite better and weight up a few pounds  Last colonoscopy was 9/20 and was essentially normal.  We did CT abdomen in 1/21 which also was normal other than scattered stool    GERD-she is on nexium which she has been using daily. Is wondering if she can have something for nausea.  She is wanting to go back to the omeprazole and try it twice a day as she does get some night time reflux  Last EGD was 9/20 and showed reactive gastritis, reflux esophagitis and hernia.     Current Outpatient Medications:   •  aspirin 81 MG EC tablet, Take 243 mg by mouth Daily., Disp: , Rfl:   •  atorvastatin (LIPITOR) 10 MG tablet, Take 1 tablet by mouth Daily., Disp: 90 tablet, Rfl: 1  •  GLUCOSAMINE-CHONDROITIN PO, Take 1 tablet by mouth 3 (Three) Times a Week., Disp: , Rfl:   •  metoprolol succinate XL (TOPROL-XL) 50 MG 24 hr tablet, TAKE ONE TABLET BY MOUTH DAILY, Disp: 90 tablet, Rfl: 1  •  Omega-3 Fatty Acids (FISH OIL) 1000 MG capsule capsule, Take  by mouth Daily With Breakfast., Disp: , Rfl:   •  Probiotic Product (PROBIOTIC DAILY PO), Take  by mouth Daily., Disp: , Rfl:   •  zolpidem (AMBIEN) 10 MG tablet, Take 1 tablet by mouth At Night As Needed for Sleep., Disp: 30 tablet, Rfl: 2  •  omeprazole (PrilOSEC) 20 MG capsule, I capsule twice a day before meals, Disp: 60  "capsule, Rfl: 11  •  ondansetron ODT (Zofran ODT) 4 MG disintegrating tablet, Place 1 tablet on the tongue Every 8 (Eight) Hours As Needed for Nausea or Vomiting., Disp: 15 tablet, Rfl: 1      Objective   Vital Signs:   /68 (BP Location: Right arm, Patient Position: Sitting)   Pulse 63   Temp 97.3 °F (36.3 °C) (Infrared)   Ht 162.6 cm (64\")   Wt 52.1 kg (114 lb 12.8 oz)   SpO2 100%   BMI 19.71 kg/m²       Physical exam  Constitutional: oriented to person, place, and time.  well-developed and well-nourished. No distress.   HENT:   Head: Normocephalic and atraumatic.   Eyes: Conjunctivae and EOM are normal.   Cardiovascular: Normal rate, regular rhythm and normal heart sounds.  Exam reveals no gallop and no friction rub.    No murmur heard.  Pulmonary/Chest: Effort normal and breath sounds normal. No respiratory distress.   no wheezes.   Neurological:  alert and oriented to person, place, and time.   Abd: soft, nondistended, epigastric tenderness  Skin: Skin is warm and dry. not diaphoretic.   Psychiatric:  normal mood and affect. behavior is normal. Judgment and thought content normal.      Result Review :   The following data was reviewed by: Nohelia Barriga MD on 01/06/2022:  CMP    CMP 1/15/21 4/8/21 10/4/21   Glucose  92 93   BUN  15 19   Creatinine 0.90 0.92 0.81   eGFR Non  Am  59 (A) 68   eGFR  Am  68 82   Sodium  143 138   Potassium  4.4 3.9   Chloride  104 99   Calcium  10.0 10.2   Total Protein  7.0 6.8   Albumin  4.5 5.00   Globulin  2.5 1.8   Total Bilirubin  0.5 0.8   Alkaline Phosphatase  86 77   AST (SGOT)  23 21   ALT (SGPT)  18 16   (A) Abnormal value       Comments are available for some flowsheets but are not being displayed.           CBC    CBC 4/8/21 10/4/21   WBC 5.1 4.90   RBC 4.47 4.42   Hemoglobin 13.2 13.0   Hematocrit 39.6 38.9   MCV 89 88.0   MCH 29.5 29.4   MCHC 33.3 33.4   RDW 11.9 12.0 (A)   Platelets 245 234   (A) Abnormal value            Lipid Panel  "   Lipid Panel 4/8/21 10/4/21   Total Cholesterol 169 179   Triglycerides 85 83   HDL Cholesterol 76 74 (A)   VLDL Cholesterol 16 15   LDL Cholesterol  77 90   (A) Abnormal value       Comments are available for some flowsheets but are not being displayed.           TSH    TSH 4/8/21   TSH 1.370           Last Urine Toxicity    There is no flowsheet data to display.      and   Lab Results   Component Value Date    UVHU81WA 49.9 04/08/2021               Assessment and Plan    Diagnoses and all orders for this visit:    1. Pure hypercholesterolemia (Primary)- FLP good, recheck 10/22    2. Primary insomnia- stable on ambien. Pt has already signed controlled substance contract. Evans done today and appropriate.   -     zolpidem (AMBIEN) 10 MG tablet; Take 1 tablet by mouth At Night As Needed for Sleep.  Dispense: 30 tablet; Refill: 2    3. Chronic constipation- off linzess, and using probiotic and activia    4. Gastroesophageal reflux disease without esophagitis- will switch back to prilosec and try bid dosing. Discussed we could have her see GI but she declines currently, but will let me know if symptoms worsen  -     omeprazole (PrilOSEC) 20 MG capsule; I capsule twice a day before meals  Dispense: 60 capsule; Refill: 11  -     ondansetron ODT (Zofran ODT) 4 MG disintegrating tablet; Place 1 tablet on the tongue Every 8 (Eight) Hours As Needed for Nausea or Vomiting.  Dispense: 15 tablet; Refill: 1        Follow Up   Return in about 3 months (around 4/6/2022).  Patient was given instructions and counseling regarding her condition or for health maintenance advice. Please see specific information pulled into the AVS if appropriate.

## 2022-04-11 NOTE — PROGRESS NOTES
History of Present Illness     Here for medicare wellness exam and physical. No hospitalizations in last year and pt feels mental health is perhaps a little bit worse because of stresses and Physical health also slightly worse with joint issues    Living will- pt has  Exercise - regularly. Walks,golf, tennis, trampoline  Diet - fairly healthy. Takes glucosamine oil, fish oil intermittently.vit D during the winter, but off now  Falls-none, tries to be careful  Pain- 2 (leg, L shoulder)  Depression - phq-2 - 0  Memory - has noticed name recall has decreased. .   specialists: GI - Bridger  -derm-  Dr Sandra  foot - Dr zavala. Eye - Bao. Derm- Jocy. Ortho - juliette     Insomnia - she is taking 1/2-1 ambien every night, and doing well overall w/ her sleep. She does tolerate and does not feel hung over the next day. no ETOH generally except occasionally a beer after she plays golf. Last filled last week     HTN - she is taking metoprolol 25 1 qd and BPs have been good     Gastritis/GERD - we have tried Nexium but did not help that much more than Prilosec so she is using Prilosec again, and is taking daily and helps  She had EGD/colon done in '20 and had some intermittent upper abdominal pain since then. Overall she does feel it has been better the last several months.   We did get a CT scan in 1/21 and showed mild/moderate constipation but otherwise was normal. We had tried Linzess 145 last year. She did fill it and took it briefly (maybe just a few doses)but felt like it caused too much stool, so stopped. She is doing probiotic (though ran out recently) and activia 3-4x a week right now, and going every 2-3 days and feels better, not as much nausea. Will use miralax and prune juice as needed as well    Allergies-. She has tried claritin-D a few times- helped a little. Some nasal congestion, but uses a hot compress which helps. No fever  Has also tried some ibuprofen - helped some w/ HA. Has some flonase at home but  has not used  Eyes itchy but not bad and no sneezing or cough     Hyperlipidemia-she is taking Lipitor regularly - 4 a week.  last FLP was 10/21. She has noticed more leg/thigh achiness over last 6 weeks. She is taking 1 ibuprfe    L shoulder - had a torn rotator cuff in past and is acting up    R knee hyritng more recently - she had surgery on knee. Uses brace. 2 ibuprfen before she plays tennis    Was exposed to covid about a month ago and she did have symptoms for about 1 day    Current Outpatient Medications on File Prior to Visit   Medication Sig Dispense Refill   • aspirin 81 MG EC tablet Take 243 mg by mouth Daily.     • GLUCOSAMINE-CHONDROITIN PO Take 1 tablet by mouth 3 (Three) Times a Week.     • Omega-3 Fatty Acids (FISH OIL) 1000 MG capsule capsule Take  by mouth Daily With Breakfast.     • omeprazole (PrilOSEC) 20 MG capsule I capsule twice a day before meals 60 capsule 11   • ondansetron ODT (Zofran ODT) 4 MG disintegrating tablet Place 1 tablet on the tongue Every 8 (Eight) Hours As Needed for Nausea or Vomiting. 15 tablet 1   • Probiotic Product (PROBIOTIC DAILY PO) Take  by mouth Daily.     • zolpidem (AMBIEN) 10 MG tablet Take 1 tablet by mouth At Night As Needed for Sleep. 30 tablet 2   • [DISCONTINUED] atorvastatin (LIPITOR) 10 MG tablet Take 1 tablet by mouth Daily. 90 tablet 1   • [DISCONTINUED] metoprolol succinate XL (TOPROL-XL) 50 MG 24 hr tablet TAKE ONE TABLET BY MOUTH DAILY 90 tablet 1     No current facility-administered medications on file prior to visit.       The following portions of the patient's history were reviewed and updated as appropriate: allergies, current medications, past family history, past medical history, past social history, past surgical history and problem list.    Review of Systems   Constitutional: Negative for activity change, appetite change, fever, unexpected weight gain and unexpected weight loss.   HENT: Positive for hearing loss.    Eyes: Negative.     Respiratory: Negative for shortness of breath and wheezing.    Cardiovascular: Negative for chest pain, palpitations and leg swelling.   Gastrointestinal: Positive for constipation.   Endocrine: Negative.    Genitourinary: Negative for difficulty urinating and dysuria.   Musculoskeletal: Positive for arthralgias.   Skin: Negative.    Allergic/Immunologic: Negative for immunocompromised state.   Neurological: Negative for seizures, speech difficulty, memory problem (just name recall) and confusion.   Hematological: Does not bruise/bleed easily.   Psychiatric/Behavioral: Positive for sleep disturbance. Negative for agitation, dysphoric mood and depressed mood.         Objective    Vitals:    04/14/22 1124   BP: 118/68   Pulse: 62   Temp: 97.5 °F (36.4 °C)   SpO2: 100%     Physical Exam   Physical Exam  Vitals and nursing note reviewed.   Constitutional:       General: She is not in acute distress.     Appearance: She is well-developed. She is not diaphoretic.   HENT:      Head: Normocephalic and atraumatic.      Right Ear: External ear normal.      Left Ear: External ear normal.      Nose: Nose normal.      Mouth/Throat:      Pharynx: No oropharyngeal exudate.   Eyes:      General: No scleral icterus.        Right eye: No discharge.         Left eye: No discharge.      Conjunctiva/sclera: Conjunctivae normal.      Pupils: Pupils are equal, round, and reactive to light.   Neck:      Thyroid: No thyromegaly.      Vascular: Carotid bruit (left) present.   Cardiovascular:      Rate and Rhythm: Normal rate and regular rhythm.      Heart sounds: Normal heart sounds. No murmur heard.    No friction rub. No gallop.   Pulmonary:      Effort: Pulmonary effort is normal. No respiratory distress.      Breath sounds: Normal breath sounds. No wheezing or rales.   Abdominal:      General: Bowel sounds are normal. There is no distension.      Palpations: Abdomen is soft. There is no mass.      Tenderness: There is no abdominal  tenderness. There is no guarding or rebound.   Musculoskeletal:         General: Tenderness (Right knee) present. No deformity.      Cervical back: Normal range of motion and neck supple.      Right lower leg: No edema.      Left lower leg: No edema.      Comments: Catching in left shoulder with active rotation.   crepitus in right knee   Lymphadenopathy:      Cervical: No cervical adenopathy.   Skin:     General: Skin is warm and dry.      Coloration: Skin is not pale.      Findings: No erythema or rash.   Neurological:      Mental Status: She is alert and oriented to person, place, and time.      Coordination: Coordination normal.      Deep Tendon Reflexes: Reflexes normal.   Psychiatric:         Behavior: Behavior normal.         Thought Content: Thought content normal.         Judgment: Judgment normal.            Assessment/Plan   Diagnoses and all orders for this visit:    1. Medicare annual wellness visit, subsequent (Primary)  Regular exercise/healthy diet. BSE q month. Sunscreen use encouraged. caclium intake reviewed.  Living will - pt has and is on file  Madhuri done at [facility] this month, normal, repeat in 1 yr  Colon due 8/23  Pneumovax - had in '10 - booster today  prevnar- had  Had hep A already  DEXA due12/23 (osteopenia)-we will schedule  Shingles - shingrix discussed -  can check at pharmacy since we do not have   covid vaccine - she had booster  Does not need paps since age 65 or older and has had normal paps in past  Dip shows some blood. Micro negative a few yrs ago - we will repeat micro    2. Routine general medical examination at a health care facility  -     POC Urinalysis Dipstick, Automated    3. NSVT (nonsustained ventricular tachycardia) (HCC)- on metoprolol    4. Essential hypertension- good control    5. Primary insomnia- stable on ambien. Pt has already signed controlled substance contract. Evans done today and appropriate.     6. Gastroesophageal reflux disease without  esophagitis-currently stable on prilosec    7. Pure hypercholesterolemia-  check FLP today.  She has had some muscle pain in her legs.  We will check CK as well.  She had been on co-Q10 but has been off recently so we will have her return start this as well    8.  Left carotid bruit-she has had this for many years.  Has had minimal blockages when we have done her carotid duplex.  Last duplex was 2016 so we will repeat.    9.  Left shoulder pain-order for PT given to patient -she will go to [facility].  If no better, she will let me know and we can have her see Ortho again    10.  Right knee pain, previous surgery-order for PT given to patient today.  She will let me know if no better

## 2022-04-14 ENCOUNTER — OFFICE VISIT (OUTPATIENT)
Dept: INTERNAL MEDICINE | Facility: CLINIC | Age: 82
End: 2022-04-14

## 2022-04-14 VITALS
BODY MASS INDEX: 19.06 KG/M2 | DIASTOLIC BLOOD PRESSURE: 68 MMHG | HEIGHT: 65 IN | HEART RATE: 62 BPM | OXYGEN SATURATION: 100 % | WEIGHT: 114.4 LBS | SYSTOLIC BLOOD PRESSURE: 118 MMHG | TEMPERATURE: 97.5 F

## 2022-04-14 DIAGNOSIS — M79.10 MYALGIA: ICD-10-CM

## 2022-04-14 DIAGNOSIS — Z00.00 MEDICARE ANNUAL WELLNESS VISIT, SUBSEQUENT: Primary | ICD-10-CM

## 2022-04-14 DIAGNOSIS — G89.29 CHRONIC LEFT SHOULDER PAIN: ICD-10-CM

## 2022-04-14 DIAGNOSIS — I47.29 NSVT (NONSUSTAINED VENTRICULAR TACHYCARDIA): ICD-10-CM

## 2022-04-14 DIAGNOSIS — M25.512 CHRONIC LEFT SHOULDER PAIN: ICD-10-CM

## 2022-04-14 DIAGNOSIS — Z00.00 ROUTINE GENERAL MEDICAL EXAMINATION AT A HEALTH CARE FACILITY: ICD-10-CM

## 2022-04-14 DIAGNOSIS — F51.01 PRIMARY INSOMNIA: ICD-10-CM

## 2022-04-14 DIAGNOSIS — E78.00 PURE HYPERCHOLESTEROLEMIA: ICD-10-CM

## 2022-04-14 DIAGNOSIS — K21.9 GASTROESOPHAGEAL REFLUX DISEASE WITHOUT ESOPHAGITIS: ICD-10-CM

## 2022-04-14 DIAGNOSIS — R09.89 LEFT CAROTID BRUIT: ICD-10-CM

## 2022-04-14 DIAGNOSIS — I10 ESSENTIAL HYPERTENSION: ICD-10-CM

## 2022-04-14 DIAGNOSIS — G89.29 CHRONIC PAIN OF RIGHT KNEE: ICD-10-CM

## 2022-04-14 DIAGNOSIS — M25.561 CHRONIC PAIN OF RIGHT KNEE: ICD-10-CM

## 2022-04-14 DIAGNOSIS — R31.29 MICROSCOPIC HEMATURIA: ICD-10-CM

## 2022-04-14 DIAGNOSIS — E55.9 VITAMIN D DEFICIENCY: ICD-10-CM

## 2022-04-14 LAB
BILIRUB BLD-MCNC: NEGATIVE MG/DL
CLARITY, POC: CLEAR
COLOR UR: YELLOW
EXPIRATION DATE: ABNORMAL
GLUCOSE UR STRIP-MCNC: NEGATIVE MG/DL
KETONES UR QL: NEGATIVE
LEUKOCYTE EST, POC: NEGATIVE
Lab: ABNORMAL
NITRITE UR-MCNC: NEGATIVE MG/ML
PH UR: 6 [PH] (ref 5–8)
PROT UR STRIP-MCNC: NEGATIVE MG/DL
RBC # UR STRIP: ABNORMAL /UL
SP GR UR: 1.01 (ref 1–1.03)
UROBILINOGEN UR QL: NORMAL

## 2022-04-14 PROCEDURE — 99397 PER PM REEVAL EST PAT 65+ YR: CPT | Performed by: INTERNAL MEDICINE

## 2022-04-14 PROCEDURE — 1160F RVW MEDS BY RX/DR IN RCRD: CPT | Performed by: INTERNAL MEDICINE

## 2022-04-14 PROCEDURE — 81003 URINALYSIS AUTO W/O SCOPE: CPT | Performed by: INTERNAL MEDICINE

## 2022-04-14 PROCEDURE — 1125F AMNT PAIN NOTED PAIN PRSNT: CPT | Performed by: INTERNAL MEDICINE

## 2022-04-14 PROCEDURE — 1170F FXNL STATUS ASSESSED: CPT | Performed by: INTERNAL MEDICINE

## 2022-04-14 PROCEDURE — 96160 PT-FOCUSED HLTH RISK ASSMT: CPT | Performed by: INTERNAL MEDICINE

## 2022-04-14 PROCEDURE — G0439 PPPS, SUBSEQ VISIT: HCPCS | Performed by: INTERNAL MEDICINE

## 2022-04-14 RX ORDER — ATORVASTATIN CALCIUM 10 MG/1
10 TABLET, FILM COATED ORAL DAILY
Qty: 90 TABLET | Refills: 1 | Status: SHIPPED | OUTPATIENT
Start: 2022-04-14 | End: 2022-12-01 | Stop reason: SDUPTHER

## 2022-04-14 RX ORDER — ZOLPIDEM TARTRATE 10 MG/1
10 TABLET ORAL NIGHTLY PRN
Qty: 30 TABLET | Refills: 2 | Status: CANCELLED | OUTPATIENT
Start: 2022-04-14

## 2022-04-14 RX ORDER — METOPROLOL SUCCINATE 50 MG/1
50 TABLET, EXTENDED RELEASE ORAL DAILY
Qty: 90 TABLET | Refills: 1 | Status: SHIPPED | OUTPATIENT
Start: 2022-04-14 | End: 2022-12-21

## 2022-04-14 RX ORDER — ZOLPIDEM TARTRATE 10 MG/1
10 TABLET ORAL NIGHTLY PRN
Qty: 30 TABLET | Refills: 2 | Status: SHIPPED | OUTPATIENT
Start: 2022-04-14 | End: 2022-08-04 | Stop reason: SDUPTHER

## 2022-04-15 ENCOUNTER — LAB (OUTPATIENT)
Dept: LAB | Facility: HOSPITAL | Age: 82
End: 2022-04-15

## 2022-04-15 DIAGNOSIS — M79.10 MYALGIA: ICD-10-CM

## 2022-04-15 DIAGNOSIS — E78.00 PURE HYPERCHOLESTEROLEMIA: ICD-10-CM

## 2022-04-15 DIAGNOSIS — I10 ESSENTIAL HYPERTENSION: ICD-10-CM

## 2022-04-15 DIAGNOSIS — Z00.00 ROUTINE GENERAL MEDICAL EXAMINATION AT A HEALTH CARE FACILITY: ICD-10-CM

## 2022-04-15 DIAGNOSIS — R31.29 MICROSCOPIC HEMATURIA: ICD-10-CM

## 2022-04-15 DIAGNOSIS — E55.9 VITAMIN D DEFICIENCY: ICD-10-CM

## 2022-04-15 LAB
BACTERIA UR QL AUTO: ABNORMAL /HPF
BILIRUB UR QL STRIP: NEGATIVE
CLARITY UR: CLEAR
COLOR UR: YELLOW
DEPRECATED RDW RBC AUTO: 41.8 FL (ref 37–54)
ERYTHROCYTE [DISTWIDTH] IN BLOOD BY AUTOMATED COUNT: 12.5 % (ref 12.3–15.4)
GLUCOSE UR STRIP-MCNC: NEGATIVE MG/DL
HCT VFR BLD AUTO: 42.6 % (ref 34–46.6)
HGB BLD-MCNC: 13.7 G/DL (ref 12–15.9)
HGB UR QL STRIP.AUTO: NEGATIVE
HYALINE CASTS UR QL AUTO: ABNORMAL /LPF
KETONES UR QL STRIP: NEGATIVE
LEUKOCYTE ESTERASE UR QL STRIP.AUTO: ABNORMAL
MCH RBC QN AUTO: 29.5 PG (ref 26.6–33)
MCHC RBC AUTO-ENTMCNC: 32.2 G/DL (ref 31.5–35.7)
MCV RBC AUTO: 91.6 FL (ref 79–97)
NITRITE UR QL STRIP: NEGATIVE
PH UR STRIP.AUTO: 7 [PH] (ref 5–8)
PLATELET # BLD AUTO: 222 10*3/MM3 (ref 140–450)
PMV BLD AUTO: 11.2 FL (ref 6–12)
PROT UR QL STRIP: NEGATIVE
RBC # BLD AUTO: 4.65 10*6/MM3 (ref 3.77–5.28)
RBC # UR STRIP: ABNORMAL /HPF
REF LAB TEST METHOD: ABNORMAL
SP GR UR STRIP: 1.02 (ref 1–1.03)
SQUAMOUS #/AREA URNS HPF: ABNORMAL /HPF
UROBILINOGEN UR QL STRIP: ABNORMAL
WBC # UR STRIP: ABNORMAL /HPF
WBC NRBC COR # BLD: 4.06 10*3/MM3 (ref 3.4–10.8)

## 2022-04-15 PROCEDURE — 82550 ASSAY OF CK (CPK): CPT | Performed by: INTERNAL MEDICINE

## 2022-04-15 PROCEDURE — 85027 COMPLETE CBC AUTOMATED: CPT | Performed by: INTERNAL MEDICINE

## 2022-04-15 PROCEDURE — 81001 URINALYSIS AUTO W/SCOPE: CPT | Performed by: INTERNAL MEDICINE

## 2022-04-15 PROCEDURE — 84443 ASSAY THYROID STIM HORMONE: CPT | Performed by: INTERNAL MEDICINE

## 2022-04-15 PROCEDURE — 80061 LIPID PANEL: CPT | Performed by: INTERNAL MEDICINE

## 2022-04-15 PROCEDURE — 80053 COMPREHEN METABOLIC PANEL: CPT | Performed by: INTERNAL MEDICINE

## 2022-04-15 PROCEDURE — 82306 VITAMIN D 25 HYDROXY: CPT | Performed by: INTERNAL MEDICINE

## 2022-04-16 LAB
25(OH)D3 SERPL-MCNC: 43.4 NG/ML (ref 30–100)
ALBUMIN SERPL-MCNC: 5.1 G/DL (ref 3.5–5.2)
ALBUMIN/GLOB SERPL: 2.4 G/DL
ALP SERPL-CCNC: 80 U/L (ref 39–117)
ALT SERPL W P-5'-P-CCNC: 18 U/L (ref 1–33)
ANION GAP SERPL CALCULATED.3IONS-SCNC: 11 MMOL/L (ref 5–15)
AST SERPL-CCNC: 22 U/L (ref 1–32)
BILIRUB SERPL-MCNC: 0.6 MG/DL (ref 0–1.2)
BUN SERPL-MCNC: 15 MG/DL (ref 8–23)
BUN/CREAT SERPL: 16.5 (ref 7–25)
CALCIUM SPEC-SCNC: 9.9 MG/DL (ref 8.6–10.5)
CHLORIDE SERPL-SCNC: 104 MMOL/L (ref 98–107)
CHOLEST SERPL-MCNC: 174 MG/DL (ref 0–200)
CK SERPL-CCNC: 74 U/L (ref 20–180)
CO2 SERPL-SCNC: 27 MMOL/L (ref 22–29)
CREAT SERPL-MCNC: 0.91 MG/DL (ref 0.57–1)
EGFRCR SERPLBLD CKD-EPI 2021: 63.1 ML/MIN/1.73
GLOBULIN UR ELPH-MCNC: 2.1 GM/DL
GLUCOSE SERPL-MCNC: 81 MG/DL (ref 65–99)
HDLC SERPL-MCNC: 67 MG/DL (ref 40–60)
LDLC SERPL CALC-MCNC: 94 MG/DL (ref 0–100)
LDLC/HDLC SERPL: 1.39 {RATIO}
POTASSIUM SERPL-SCNC: 4.3 MMOL/L (ref 3.5–5.2)
PROT SERPL-MCNC: 7.2 G/DL (ref 6–8.5)
SODIUM SERPL-SCNC: 142 MMOL/L (ref 136–145)
TRIGL SERPL-MCNC: 70 MG/DL (ref 0–150)
TSH SERPL DL<=0.05 MIU/L-ACNC: 1.83 UIU/ML (ref 0.27–4.2)
VLDLC SERPL-MCNC: 13 MG/DL (ref 5–40)

## 2022-05-11 ENCOUNTER — APPOINTMENT (OUTPATIENT)
Dept: CARDIOLOGY | Facility: HOSPITAL | Age: 82
End: 2022-05-11

## 2022-05-13 ENCOUNTER — HOSPITAL ENCOUNTER (OUTPATIENT)
Dept: CARDIOLOGY | Facility: HOSPITAL | Age: 82
Discharge: HOME | End: 2022-05-13
Admitting: INTERNAL MEDICINE

## 2022-05-13 VITALS — WEIGHT: 112.43 LBS | BODY MASS INDEX: 19.92 KG/M2 | HEIGHT: 63 IN

## 2022-05-13 DIAGNOSIS — R09.89 LEFT CAROTID BRUIT: ICD-10-CM

## 2022-05-13 LAB
BH CV XLRA MEAS LEFT DIST CCA EDV: -34.6 CM/SEC
BH CV XLRA MEAS LEFT DIST CCA PSV: -89.6 CM/SEC
BH CV XLRA MEAS LEFT DIST ICA EDV: -31.4 CM/SEC
BH CV XLRA MEAS LEFT DIST ICA PSV: -86.4 CM/SEC
BH CV XLRA MEAS LEFT ICA/CCA RATIO: 0.8
BH CV XLRA MEAS LEFT MID CCA EDV: -30.6 CM/SEC
BH CV XLRA MEAS LEFT MID CCA PSV: -97.4 CM/SEC
BH CV XLRA MEAS LEFT MID ICA EDV: -26.7 CM/SEC
BH CV XLRA MEAS LEFT MID ICA PSV: -84.9 CM/SEC
BH CV XLRA MEAS LEFT PROX CCA EDV: 29.1 CM/SEC
BH CV XLRA MEAS LEFT PROX CCA PSV: 99.8 CM/SEC
BH CV XLRA MEAS LEFT PROX ECA EDV: -18.9 CM/SEC
BH CV XLRA MEAS LEFT PROX ECA PSV: -75.4 CM/SEC
BH CV XLRA MEAS LEFT PROX ICA EDV: -25.9 CM/SEC
BH CV XLRA MEAS LEFT PROX ICA PSV: -86.4 CM/SEC
BH CV XLRA MEAS LEFT PROX SCLA PSV: 99.6 CM/SEC
BH CV XLRA MEAS LEFT VERTEBRAL A EDV: 35.4 CM/SEC
BH CV XLRA MEAS LEFT VERTEBRAL A PSV: 122 CM/SEC
BH CV XLRA MEAS RIGHT DIST CCA EDV: -18.2 CM/SEC
BH CV XLRA MEAS RIGHT DIST CCA PSV: -61.9 CM/SEC
BH CV XLRA MEAS RIGHT DIST ICA EDV: -30.5 CM/SEC
BH CV XLRA MEAS RIGHT DIST ICA PSV: -101 CM/SEC
BH CV XLRA MEAS RIGHT ICA/CCA RATIO: 1.6
BH CV XLRA MEAS RIGHT MID CCA EDV: 23.1 CM/SEC
BH CV XLRA MEAS RIGHT MID CCA PSV: 69.7 CM/SEC
BH CV XLRA MEAS RIGHT MID ICA EDV: 38.3 CM/SEC
BH CV XLRA MEAS RIGHT MID ICA PSV: 108 CM/SEC
BH CV XLRA MEAS RIGHT PROX CCA EDV: 19.2 CM/SEC
BH CV XLRA MEAS RIGHT PROX CCA PSV: 78.1 CM/SEC
BH CV XLRA MEAS RIGHT PROX ECA EDV: -11.8 CM/SEC
BH CV XLRA MEAS RIGHT PROX ECA PSV: -70.7 CM/SEC
BH CV XLRA MEAS RIGHT PROX ICA EDV: -18.2 CM/SEC
BH CV XLRA MEAS RIGHT PROX ICA PSV: -67.3 CM/SEC
BH CV XLRA MEAS RIGHT PROX SCLA PSV: 119 CM/SEC
BH CV XLRA MEAS RIGHT VERTEBRAL A EDV: 14.1 CM/SEC
BH CV XLRA MEAS RIGHT VERTEBRAL A PSV: 57.4 CM/SEC
LEFT ARM BP: NORMAL MMHG
MAXIMAL PREDICTED HEART RATE: 138 BPM
RIGHT ARM BP: NORMAL MMHG
STRESS TARGET HR: 117 BPM

## 2022-05-13 PROCEDURE — 93880 EXTRACRANIAL BILAT STUDY: CPT | Performed by: INTERNAL MEDICINE

## 2022-05-13 PROCEDURE — 93880 EXTRACRANIAL BILAT STUDY: CPT

## 2022-07-19 ENCOUNTER — TELEPHONE (OUTPATIENT)
Dept: INTERNAL MEDICINE | Facility: CLINIC | Age: 82
End: 2022-07-19

## 2022-08-04 ENCOUNTER — OFFICE VISIT (OUTPATIENT)
Dept: INTERNAL MEDICINE | Facility: CLINIC | Age: 82
End: 2022-08-04

## 2022-08-04 VITALS
TEMPERATURE: 97.3 F | OXYGEN SATURATION: 97 % | HEART RATE: 54 BPM | HEIGHT: 65 IN | SYSTOLIC BLOOD PRESSURE: 124 MMHG | BODY MASS INDEX: 18.16 KG/M2 | WEIGHT: 109 LBS | DIASTOLIC BLOOD PRESSURE: 68 MMHG

## 2022-08-04 DIAGNOSIS — F51.01 PRIMARY INSOMNIA: Chronic | ICD-10-CM

## 2022-08-04 DIAGNOSIS — E78.00 PURE HYPERCHOLESTEROLEMIA: Chronic | ICD-10-CM

## 2022-08-04 DIAGNOSIS — K21.9 GASTROESOPHAGEAL REFLUX DISEASE WITHOUT ESOPHAGITIS: Chronic | ICD-10-CM

## 2022-08-04 DIAGNOSIS — U07.1 COVID-19 VIRUS INFECTION: Primary | ICD-10-CM

## 2022-08-04 PROBLEM — I47.29 NSVT (NONSUSTAINED VENTRICULAR TACHYCARDIA) (HCC): Chronic | Status: ACTIVE | Noted: 2020-07-07

## 2022-08-04 PROCEDURE — 99214 OFFICE O/P EST MOD 30 MIN: CPT | Performed by: INTERNAL MEDICINE

## 2022-08-04 RX ORDER — ZOLPIDEM TARTRATE 10 MG/1
10 TABLET ORAL NIGHTLY PRN
Qty: 30 TABLET | Refills: 2 | Status: SHIPPED | OUTPATIENT
Start: 2022-08-04 | End: 2022-12-01 | Stop reason: SDUPTHER

## 2022-08-04 NOTE — PROGRESS NOTES
Chief Complaint  Insomnia (F/u), Hypertension (F/u), and symptoms after covid (She had covid and still has symptoms of hoarseness, cough and don't feel good.)    Subjective          Joselyn Segura presents to [facility]  History of Present Illness    Insomnia - she is taking 1/2-1 ambien every night, and doing well overall w/ her sleep. She does tolerate and does not feel hung over the next day     covid - she had covid last month - symptoms started on 7/14, and she did take lagevrio and a zpack on 7/16.  rash on torso and diarrhea started a week later.  Her heart rate was also fast in the 120-130 range.  The symptoms have improved once she was off the medication.  However, she continues to have fatigue, hoarseness, postnasal drainage,and cough that is mostly nonproductive. Her tongue has been discolored over last 4-5 days and has had metallic taste.   Breathing seems OK. She has tried to play tennis once and did OK.     HTN - she is taking metoprolol 25 1 qd and BPs have been good generally     Gastritis/GERD - we have tried Nexium but did not help that much more than Prilosec so she is using Prilosec as needed.  Has not had to do every day.  She is also using align and BM are daily or every other day  She had EGD/colon done in '20 and a CT scan in 1/21 and showed mild/moderate constipation but otherwise was normal.       Hyperlipidemia-she is taking Lipitor regularly - 4 a week.  last LDL was 94 done in 4/22         Current Outpatient Medications:   •  aspirin 81 MG EC tablet, Take 243 mg by mouth Daily., Disp: , Rfl:   •  atorvastatin (LIPITOR) 10 MG tablet, Take 1 tablet by mouth Daily., Disp: 90 tablet, Rfl: 1  •  Coenzyme Q10 (COQ10 PO), Take 1 capsule by mouth Daily., Disp: , Rfl:   •  GLUCOSAMINE-CHONDROITIN PO, Take 1 tablet by mouth 3 (Three) Times a Week., Disp: , Rfl:   •  metoprolol succinate XL (TOPROL-XL) 50 MG 24 hr tablet, Take 1 tablet by mouth Daily., Disp: 90  "tablet, Rfl: 1  •  omeprazole (PrilOSEC) 20 MG capsule, I capsule twice a day before meals, Disp: 60 capsule, Rfl: 11  •  Probiotic Product (PROBIOTIC DAILY PO), Take  by mouth Daily., Disp: , Rfl:   •  zolpidem (AMBIEN) 10 MG tablet, Take 1 tablet by mouth At Night As Needed for Sleep., Disp: 30 tablet, Rfl: 2  •  metroNIDAZOLE (METROCREAM) 0.75 % cream, 1 application Daily., Disp: , Rfl:   •  Omega-3 Fatty Acids (FISH OIL) 1000 MG capsule capsule, Take  by mouth Daily With Breakfast., Disp: , Rfl:   •  ondansetron ODT (Zofran ODT) 4 MG disintegrating tablet, Place 1 tablet on the tongue Every 8 (Eight) Hours As Needed for Nausea or Vomiting., Disp: 15 tablet, Rfl: 1      Objective   Vital Signs:   /68 (BP Location: Right arm, Patient Position: Sitting)   Pulse 54   Temp 97.3 °F (36.3 °C) (Infrared)   Ht 164.3 cm (64.7\")   Wt 49.4 kg (109 lb)   SpO2 97%   BMI 18.31 kg/m²       Physical exam  Constitutional: oriented to person, place, and time.  well-developed and well-nourished. No distress.   HENT:   Head: Normocephalic and atraumatic.   OP: Slight erythema and minimal drainage.  Tongue is discolored laterally  Neck: No lymphadenopathy  Eyes: Conjunctivae and EOM are normal.   Cardiovascular: Normal rate, regular rhythm and normal heart sounds.  Exam reveals no gallop and no friction rub.    No murmur heard.  Pulmonary/Chest: Effort normal and breath sounds normal. No respiratory distress.   no wheezes.   Abdomen: Soft, nontender nondistended  Neurological:  alert and oriented to person, place, and time.   Skin: Skin is warm and dry. not diaphoretic.   Psychiatric:  normal mood and affect. behavior is normal. Judgment and thought content normal.      Result Review :   The following data was reviewed by: Nohelia Barriga MD on 08/04/2022:  Common labs    Common Labsle 10/4/21 10/4/21 10/4/21 4/15/22 4/15/22 4/15/22    1157 1157 1157 1003 1003 1003   Glucose   93   81   BUN   19   15   Creatinine   0.81   " 0.91   eGFR Non  Am   68      eGFR African Am   82      Sodium   138   142   Potassium   3.9   4.3   Chloride   99   104   Calcium   10.2   9.9   Total Protein   6.8      Albumin   5.00   5.10   Total Bilirubin   0.8   0.6   Alkaline Phosphatase   77   80   AST (SGOT)   21   22   ALT (SGPT)   16   18   WBC 4.90   4.06     Hemoglobin 13.0   13.7     Hematocrit 38.9   42.6     Platelets 234   222     Total Cholesterol     174    Total Cholesterol  179       Triglycerides  83   70    HDL Cholesterol  74 (A)   67 (A)    LDL Cholesterol   90   94    (A) Abnormal value       Comments are available for some flowsheets but are not being displayed.           Lab Results   Component Value Date    MDUG57FL 43.4 04/15/2022               Assessment and Plan    Diagnoses and all orders for this visit:    1. COVID-19 virus infection (Primary)-patient has improved though still some upper respiratory symptoms.  I gave her some Mucinex to do twice a day and continue to push fluids and rest.  We will brush her tongue for discoloration and that should improve with time as well.  If symptoms linger or new symptoms develop, she will let me know    2. Primary insomnia-stable on Ambien.  She has signed controlled substance contract.  Evans appropriate  -     zolpidem (AMBIEN) 10 MG tablet; Take 1 tablet by mouth At Night As Needed for Sleep.  Dispense: 30 tablet; Refill: 2    3. Gastroesophageal reflux disease without esophagitis- patient doing better and using Prilosec as needed    4. Pure hypercholesterolemia--recheck 4/23        Follow Up   Return in about 3 months (around 11/4/2022) for Next scheduled follow up.  Patient was given instructions and counseling regarding her condition or for health maintenance advice. Please see specific information pulled into the AVS if appropriate.

## 2022-09-01 ENCOUNTER — TELEPHONE (OUTPATIENT)
Dept: INTERNAL MEDICINE | Facility: CLINIC | Age: 82
End: 2022-09-01

## 2022-09-01 NOTE — TELEPHONE ENCOUNTER
If she can schedule a f/u here w/ whichever provider has an opening to get ear and throat rechecked

## 2022-09-01 NOTE — TELEPHONE ENCOUNTER
Called and spoke with patient, informed her of options to see someone in our office. She didn't want to wait through the holiday weekend. She stated that she does have an urgent care in [place] that she is going to go to.

## 2022-09-01 NOTE — TELEPHONE ENCOUNTER
Caller: Joselyn Segura    Relationship to patient: Self    Best call back number: [phone]    Date of positive COVID19 test: 8.4.22    COVID19 symptoms: RIGHT SIDE OF THROAT AND RIGHT EAR PAIN    Additional information or concerns: PATIENT STATES THAT THESE COVID SYMPTOMS ARE STILL OCCURRING AND NOT GETTING ANY BETTER AND THE EAR PAIN IS KEEPING HER AWAKE AT NIGHT. PATIENT ALSO STATES THAT HER TONGUE IS NO LONGER BLACK, BUT NOW HAS A GRAY COLOR.     What is the patients [place] [address] [address] [facility] [phone] [facility] [phone] FX    PLEASE CALL TO ADVISE.

## 2022-11-28 ENCOUNTER — TELEPHONE (OUTPATIENT)
Dept: INTERNAL MEDICINE | Facility: CLINIC | Age: 82
End: 2022-11-28

## 2022-11-28 PROBLEM — I10 ESSENTIAL HYPERTENSION: Chronic | Status: ACTIVE | Noted: 2018-12-09

## 2022-11-28 NOTE — TELEPHONE ENCOUNTER
Patient has been notified and verbalized understanding.  She will go to one of the outpatient facilities to get her labs drawn.

## 2022-11-28 NOTE — TELEPHONE ENCOUNTER
Caller: Joselyn Segura    Relationship: Self    Best call back number: [phone]    What orders are you requesting (i.e. lab or imaging): BLOOD WORK FOR APPOINTMENT ON 12/1/2022    In what timeframe would the patient need to come in: BEFORE APPOINTMENT     Where will you receive your lab/imaging services: IN  OFFICE     Additional notes: PLEASE CALL PATIENT TO LET HER KNOW IF THE ORDER CAN BE PUT IN

## 2022-12-01 ENCOUNTER — OFFICE VISIT (OUTPATIENT)
Dept: INTERNAL MEDICINE | Facility: CLINIC | Age: 82
End: 2022-12-01

## 2022-12-01 ENCOUNTER — LAB (OUTPATIENT)
Dept: LAB | Facility: HOSPITAL | Age: 82
End: 2022-12-01

## 2022-12-01 VITALS
BODY MASS INDEX: 19.33 KG/M2 | OXYGEN SATURATION: 96 % | HEIGHT: 65 IN | TEMPERATURE: 97.3 F | WEIGHT: 116 LBS | SYSTOLIC BLOOD PRESSURE: 142 MMHG | HEART RATE: 68 BPM | DIASTOLIC BLOOD PRESSURE: 74 MMHG

## 2022-12-01 DIAGNOSIS — R35.0 URINARY FREQUENCY: ICD-10-CM

## 2022-12-01 DIAGNOSIS — Z23 NEED FOR COVID-19 VACCINE: ICD-10-CM

## 2022-12-01 DIAGNOSIS — R31.29 MICROSCOPIC HEMATURIA: ICD-10-CM

## 2022-12-01 DIAGNOSIS — I10 ESSENTIAL HYPERTENSION: ICD-10-CM

## 2022-12-01 DIAGNOSIS — F51.01 PRIMARY INSOMNIA: Chronic | ICD-10-CM

## 2022-12-01 DIAGNOSIS — E78.00 PURE HYPERCHOLESTEROLEMIA: Primary | Chronic | ICD-10-CM

## 2022-12-01 DIAGNOSIS — I10 ESSENTIAL HYPERTENSION: Chronic | ICD-10-CM

## 2022-12-01 LAB
BASOPHILS # BLD AUTO: 0.04 10*3/MM3 (ref 0–0.2)
BASOPHILS NFR BLD AUTO: 0.6 % (ref 0–1.5)
BILIRUB BLD-MCNC: NEGATIVE MG/DL
CLARITY, POC: CLEAR
COLOR UR: YELLOW
DEPRECATED RDW RBC AUTO: 36.7 FL (ref 37–54)
EOSINOPHIL # BLD AUTO: 0.08 10*3/MM3 (ref 0–0.4)
EOSINOPHIL NFR BLD AUTO: 1.2 % (ref 0.3–6.2)
ERYTHROCYTE [DISTWIDTH] IN BLOOD BY AUTOMATED COUNT: 11.9 % (ref 12.3–15.4)
EXPIRATION DATE: ABNORMAL
GLUCOSE UR STRIP-MCNC: NEGATIVE MG/DL
HCT VFR BLD AUTO: 39.8 % (ref 34–46.6)
HGB BLD-MCNC: 13.6 G/DL (ref 12–15.9)
IMM GRANULOCYTES # BLD AUTO: 0.02 10*3/MM3 (ref 0–0.05)
IMM GRANULOCYTES NFR BLD AUTO: 0.3 % (ref 0–0.5)
KETONES UR QL: NEGATIVE
LEUKOCYTE EST, POC: NEGATIVE
LYMPHOCYTES # BLD AUTO: 1.65 10*3/MM3 (ref 0.7–3.1)
LYMPHOCYTES NFR BLD AUTO: 24.7 % (ref 19.6–45.3)
Lab: ABNORMAL
MCH RBC QN AUTO: 29.1 PG (ref 26.6–33)
MCHC RBC AUTO-ENTMCNC: 34.2 G/DL (ref 31.5–35.7)
MCV RBC AUTO: 85.2 FL (ref 79–97)
MONOCYTES # BLD AUTO: 0.71 10*3/MM3 (ref 0.1–0.9)
MONOCYTES NFR BLD AUTO: 10.6 % (ref 5–12)
NEUTROPHILS NFR BLD AUTO: 4.19 10*3/MM3 (ref 1.7–7)
NEUTROPHILS NFR BLD AUTO: 62.6 % (ref 42.7–76)
NITRITE UR-MCNC: NEGATIVE MG/ML
NRBC BLD AUTO-RTO: 0 /100 WBC (ref 0–0.2)
PH UR: 6 [PH] (ref 5–8)
PLATELET # BLD AUTO: 246 10*3/MM3 (ref 140–450)
PMV BLD AUTO: 11 FL (ref 6–12)
PROT UR STRIP-MCNC: NEGATIVE MG/DL
RBC # BLD AUTO: 4.67 10*6/MM3 (ref 3.77–5.28)
RBC # UR STRIP: ABNORMAL /UL
SP GR UR: 1.02 (ref 1–1.03)
UROBILINOGEN UR QL: NORMAL
WBC NRBC COR # BLD: 6.69 10*3/MM3 (ref 3.4–10.8)

## 2022-12-01 PROCEDURE — 85025 COMPLETE CBC W/AUTO DIFF WBC: CPT

## 2022-12-01 PROCEDURE — 0124A COVID-19 (PFIZER) BIVALENT BOOSTER 12+YRS: CPT | Performed by: INTERNAL MEDICINE

## 2022-12-01 PROCEDURE — 99214 OFFICE O/P EST MOD 30 MIN: CPT | Performed by: INTERNAL MEDICINE

## 2022-12-01 PROCEDURE — 81003 URINALYSIS AUTO W/O SCOPE: CPT | Performed by: INTERNAL MEDICINE

## 2022-12-01 PROCEDURE — 91312 COVID-19 (PFIZER) BIVALENT BOOSTER 12+YRS: CPT | Performed by: INTERNAL MEDICINE

## 2022-12-01 PROCEDURE — 80053 COMPREHEN METABOLIC PANEL: CPT

## 2022-12-01 RX ORDER — ATORVASTATIN CALCIUM 10 MG/1
10 TABLET, FILM COATED ORAL DAILY
Qty: 90 TABLET | Refills: 1 | Status: SHIPPED | OUTPATIENT
Start: 2022-12-01

## 2022-12-01 RX ORDER — ZOLPIDEM TARTRATE 10 MG/1
10 TABLET ORAL NIGHTLY PRN
Qty: 30 TABLET | Refills: 2 | Status: SHIPPED | OUTPATIENT
Start: 2022-12-01 | End: 2023-03-27

## 2022-12-02 LAB
ALBUMIN SERPL-MCNC: 4.9 G/DL (ref 3.5–5.2)
ALBUMIN/GLOB SERPL: 2.1 G/DL
ALP SERPL-CCNC: 83 U/L (ref 39–117)
ALT SERPL W P-5'-P-CCNC: 22 U/L (ref 1–33)
ANION GAP SERPL CALCULATED.3IONS-SCNC: 11.7 MMOL/L (ref 5–15)
AST SERPL-CCNC: 26 U/L (ref 1–32)
BILIRUB SERPL-MCNC: 0.5 MG/DL (ref 0–1.2)
BUN SERPL-MCNC: 21 MG/DL (ref 8–23)
BUN/CREAT SERPL: 25.3 (ref 7–25)
CALCIUM SPEC-SCNC: 9.8 MG/DL (ref 8.6–10.5)
CHLORIDE SERPL-SCNC: 100 MMOL/L (ref 98–107)
CO2 SERPL-SCNC: 29.3 MMOL/L (ref 22–29)
CREAT SERPL-MCNC: 0.83 MG/DL (ref 0.57–1)
EGFRCR SERPLBLD CKD-EPI 2021: 70 ML/MIN/1.73
GLOBULIN UR ELPH-MCNC: 2.3 GM/DL
GLUCOSE SERPL-MCNC: 92 MG/DL (ref 65–99)
POTASSIUM SERPL-SCNC: 4.4 MMOL/L (ref 3.5–5.2)
PROT SERPL-MCNC: 7.2 G/DL (ref 6–8.5)
SODIUM SERPL-SCNC: 141 MMOL/L (ref 136–145)

## 2022-12-03 LAB
APPEARANCE UR: CLEAR
BACTERIA #/AREA URNS HPF: NORMAL /[HPF]
BACTERIA UR CULT: NO GROWTH
BACTERIA UR CULT: NORMAL
BILIRUB UR QL STRIP: NEGATIVE
CASTS URNS QL MICRO: NORMAL /LPF
COLOR UR: YELLOW
EPI CELLS #/AREA URNS HPF: NORMAL /HPF (ref 0–10)
GLUCOSE UR QL STRIP: NEGATIVE
HGB UR QL STRIP: NEGATIVE
KETONES UR QL STRIP: NEGATIVE
LEUKOCYTE ESTERASE UR QL STRIP: NEGATIVE
MICRO URNS: NORMAL
MICRO URNS: NORMAL
NITRITE UR QL STRIP: NEGATIVE
PH UR STRIP: 6.5 [PH] (ref 5–7.5)
PROT UR QL STRIP: NEGATIVE
RBC #/AREA URNS HPF: NORMAL /HPF (ref 0–2)
SP GR UR STRIP: 1.02 (ref 1–1.03)
UROBILINOGEN UR STRIP-MCNC: 0.2 MG/DL (ref 0.2–1)
WBC #/AREA URNS HPF: NORMAL /HPF (ref 0–5)

## 2022-12-06 ENCOUNTER — OFFICE VISIT (OUTPATIENT)
Dept: CARDIOLOGY | Facility: CLINIC | Age: 82
End: 2022-12-06

## 2022-12-06 VITALS
DIASTOLIC BLOOD PRESSURE: 70 MMHG | OXYGEN SATURATION: 97 % | HEART RATE: 68 BPM | SYSTOLIC BLOOD PRESSURE: 138 MMHG | BODY MASS INDEX: 19.81 KG/M2 | HEIGHT: 64 IN | WEIGHT: 116 LBS

## 2022-12-06 DIAGNOSIS — I47.29 NSVT (NONSUSTAINED VENTRICULAR TACHYCARDIA): Chronic | ICD-10-CM

## 2022-12-06 DIAGNOSIS — E78.00 PURE HYPERCHOLESTEROLEMIA: Chronic | ICD-10-CM

## 2022-12-06 DIAGNOSIS — I10 ESSENTIAL HYPERTENSION: Primary | Chronic | ICD-10-CM

## 2022-12-06 PROCEDURE — 99213 OFFICE O/P EST LOW 20 MIN: CPT | Performed by: INTERNAL MEDICINE

## 2022-12-06 PROCEDURE — 93000 ELECTROCARDIOGRAM COMPLETE: CPT | Performed by: INTERNAL MEDICINE

## 2022-12-06 NOTE — PROGRESS NOTES
New Cardiology Patient Office Visit      Date: 2022  Patient Name: Joselyn Segura  : 1940   MRN: [id number]   PCP: Nohelia Barriga MD   Referring Provider: No ref. provider found     Chief Complaint:    Chief Complaint   Patient presents with   • NSVT       History of Present Illness: Joseyln Segura is a 82 y.o. female who is here today for follow-up on her cardiology symptoms.  She has been doing fine.  She denies any chest pain any shortness of breath any dizziness or any palpitations.  Her blood pressure does run low in summer when she plays golf.  In winter blood pressure is not that much affected at all.  She is able to do all the activities and has taken care of herself.      Problem List     1. CARDIAC  a. Coronary Artery Disease:   i. Stress echo, 2012: Normal  ii. Stress test 2019: Normal    b. Myocardium:   i. Echo, 2019: LVEF 70%    c. Valvular:   i. Mild aortic sclerosis, MAC    d. Electrical:   i. Normal sinus rhythm, Holter monitor 2019 4 beats run of V. tach    e. Percardium:   i. Normal      2. CARDIAC RISK FACTORS:  a.        Hypertension  b.        Dyslipidemia  c.        Tobacco Use remote history of smoking for 1 to 3 pack years    3. NON-CARDIAC:  a. GERD  b. Osteopenia  c. Insomnia    4. SURGERIES:  a. Left ACL reconstruction  b. Total abdominal hysterectomy with salpingo-oophorectomy  c. Basal cell carcinoma excision  d. Right knee arthroscopy    Subjective      Review of Systems:   Review of Systems   Neurological: Positive for light-headedness.       Medications:   Current Outpatient Medications   Medication Sig Dispense Refill   • aspirin 81 MG EC tablet Take 243 mg by mouth Daily.     • atorvastatin (LIPITOR) 10 MG tablet Take 1 tablet by mouth Daily. (Patient taking differently: Take 10 mg by mouth 3 (Three) Times a Week.) 90 tablet 1   • Coenzyme Q10 (COQ10 PO) Take 1 capsule by mouth Daily.     • GLUCOSAMINE-CHONDROITIN PO Take 1 tablet by  "mouth 3 (Three) Times a Week.     • metoprolol succinate XL (TOPROL-XL) 50 MG 24 hr tablet Take 1 tablet by mouth Daily. 90 tablet 1   • metroNIDAZOLE (METROCREAM) 0.75 % cream 1 application Daily As Needed.     • Omega-3 Fatty Acids (FISH OIL) 1000 MG capsule capsule Take  by mouth Daily With Breakfast.     • omeprazole (PrilOSEC) 20 MG capsule I capsule twice a day before meals (Patient taking differently: 20 mg As Needed. I capsule twice a day before meals) 60 capsule 11   • Probiotic Product (PROBIOTIC DAILY PO) Take  by mouth Daily.     • zolpidem (AMBIEN) 10 MG tablet Take 1 tablet by mouth At Night As Needed for Sleep. 30 tablet 2     No current facility-administered medications for this visit.           The following portions of the patient's history were reviewed and updated as appropriate: allergies, current medications, past family history, past medical history, past social history, past surgical history and problem list.    Objective     Physical Exam:  Vital Signs:   Vitals:    12/06/22 1546   BP: 138/70   BP Location: Right arm   Patient Position: Sitting   Pulse: 68   SpO2: 97%   Weight: 52.6 kg (116 lb)   Height: 162.6 cm (64\")     Body mass index is 19.91 kg/m².     Constitutional:       General: Not in acute distress.     Appearance: Healthy appearance. Not in distress.     Neck:     JVP: Not elevated     Carotid artery: No carotid bruit    Pulmonary:      Effort: Pulmonary effort is normal.      Breath sounds: Normal breath sounds. No wheezing. No rhonchi. No rales.     Cardiovascular:      Normal rate. Regular rhythm. Normal S1. Normal S2.      Murmurs: There is no murmur.      No gallop. No click. No rub.     Abdominal:      General: Bowel sounds are normal.      Palpations: Abdomen is soft.      Tenderness: There is no abdominal tenderness.    Extremities:     Pulses: Good palpable pulses bilaterally     Edema: No edema    Labs:  Lab Results   Component Value Date    GLUCOSE 92 12/01/2022    " BUN 21 12/01/2022    CREATININE 0.83 12/01/2022    EGFRIFNONA 68 10/04/2021    EGFRIFAFRI 82 10/04/2021    BCR 25.3 (H) 12/01/2022    K 4.4 12/01/2022    CO2 29.3 (H) 12/01/2022    CALCIUM 9.8 12/01/2022    PROTENTOTREF 6.8 10/04/2021    ALBUMIN 4.90 12/01/2022    LABIL2 2.8 10/04/2021    AST 26 12/01/2022    ALT 22 12/01/2022     Lab Results   Component Value Date    WBC 6.69 12/01/2022    HGB 13.6 12/01/2022    HCT 39.8 12/01/2022    MCV 85.2 12/01/2022     12/01/2022     Lab Results   Component Value Date    CHOL 174 04/15/2022    CHLPL 179 10/04/2021    TRIG 70 04/15/2022    HDL 67 (H) 04/15/2022    LDL 94 04/15/2022     Lab Results   Component Value Date    TSH 1.830 04/15/2022           ECG 12 Lead    Date/Time: 12/6/2022 4:16 PM  Performed by: Red Kay MD  Authorized by: Red Kay MD   Comparison: compared with previous ECG from 10/3/2019  Similar to previous ECG            Smoking Cessation:   Remote history of smoking    Assessment / Plan      Assessment:   Diagnosis Plan   1. Essential hypertension        2. Pure hypercholesterolemia        3. NSVT (nonsustained ventricular tachycardia) (HCC)             Plan:    1.  Patient blood pressure has been running fine.  Patient denies any symptoms at this time we will continue with current medications.    2.  Her lipids have been fine.  Continue monitoring her LDL          Follow Up:   Return in about 1 year (around 12/6/2023).    Red Kay MD

## 2022-12-21 RX ORDER — METOPROLOL SUCCINATE 50 MG/1
TABLET, EXTENDED RELEASE ORAL
Qty: 90 TABLET | Refills: 1 | Status: SHIPPED | OUTPATIENT
Start: 2022-12-21

## 2023-01-24 ENCOUNTER — TELEPHONE (OUTPATIENT)
Dept: INTERNAL MEDICINE | Facility: CLINIC | Age: 83
End: 2023-01-24
Payer: MEDICARE

## 2023-01-24 DIAGNOSIS — I10 ESSENTIAL HYPERTENSION: Primary | Chronic | ICD-10-CM

## 2023-01-24 RX ORDER — LOSARTAN POTASSIUM 25 MG/1
25 TABLET ORAL DAILY
Qty: 30 TABLET | Refills: 1 | Status: SHIPPED | OUTPATIENT
Start: 2023-01-24 | End: 2023-02-16 | Stop reason: SDUPTHER

## 2023-01-24 NOTE — TELEPHONE ENCOUNTER
I will add low dose losartan to take one every morning. Keep taking her metoprolol. If she can schedule a f/u here in 3-4 weeks and bring in her BP readings.

## 2023-01-24 NOTE — TELEPHONE ENCOUNTER
Caller: Joselyn Segura    Relationship: Self    Best call back number: [phone]    What is the best time to reach you: ANY    Who are you requesting to speak with (clinical staff, provider,  specific staff member): DR. MCLEOD OR HER NURSE    What was the call regardin22 HIGH BLOOD PRESSURE. PATIENT HAS BEEN WATCHING HER BLOOD PRESSURE AS ADVISED. IN AFTERNOON IT IS ALWAYS /80-90 BUT IT HAS BEEN RAISING. PLEASE CALL ASAP TO ADVISE.    Do you require a callback: YES ASAP

## 2023-01-30 ENCOUNTER — EXTERNAL PBMM DATA (OUTPATIENT)
Dept: PHARMACY | Facility: OTHER | Age: 83
End: 2023-01-30
Payer: MEDICARE

## 2023-02-16 ENCOUNTER — OFFICE VISIT (OUTPATIENT)
Dept: INTERNAL MEDICINE | Facility: CLINIC | Age: 83
End: 2023-02-16
Payer: MEDICARE

## 2023-02-16 ENCOUNTER — LAB (OUTPATIENT)
Dept: INTERNAL MEDICINE | Facility: CLINIC | Age: 83
End: 2023-02-16
Payer: MEDICARE

## 2023-02-16 VITALS
OXYGEN SATURATION: 100 % | HEIGHT: 65 IN | SYSTOLIC BLOOD PRESSURE: 136 MMHG | TEMPERATURE: 97.3 F | BODY MASS INDEX: 19.33 KG/M2 | DIASTOLIC BLOOD PRESSURE: 69 MMHG | HEART RATE: 66 BPM | WEIGHT: 116 LBS

## 2023-02-16 DIAGNOSIS — I10 ESSENTIAL HYPERTENSION: Primary | Chronic | ICD-10-CM

## 2023-02-16 DIAGNOSIS — I10 ESSENTIAL HYPERTENSION: Chronic | ICD-10-CM

## 2023-02-16 DIAGNOSIS — R45.4 IRRITABILITY: ICD-10-CM

## 2023-02-16 DIAGNOSIS — E78.00 PURE HYPERCHOLESTEROLEMIA: Chronic | ICD-10-CM

## 2023-02-16 DIAGNOSIS — F51.01 PRIMARY INSOMNIA: Chronic | ICD-10-CM

## 2023-02-16 PROCEDURE — 99214 OFFICE O/P EST MOD 30 MIN: CPT | Performed by: INTERNAL MEDICINE

## 2023-02-16 RX ORDER — LOSARTAN POTASSIUM 25 MG/1
25 TABLET ORAL DAILY
Qty: 30 TABLET | Refills: 11 | Status: SHIPPED | OUTPATIENT
Start: 2023-02-16

## 2023-02-16 RX ORDER — ESCITALOPRAM OXALATE 5 MG/1
5 TABLET ORAL DAILY
Qty: 30 TABLET | Refills: 11 | Status: SHIPPED | OUTPATIENT
Start: 2023-02-16

## 2023-02-16 NOTE — PROGRESS NOTES
Answers for HPI/ROS submitted by the patient on 2/16/2023  What is the primary reason for your visit?: High Blood Pressure    Chief Complaint  Hypertension, Hyperlipidemia (She only takes the medication 4 times a week.), and Insomnia    Subjective          Joselyn Segura presents to [facility]  History of Present Illness    HTN - she started feeling funny last month- she can't describe exactly how she felt-but made her start checking her blood pressure and she noticed readings were high, worse usually in the evenings.  Morning readings ranged anywhere from 116/69 to 143/83.  Evening readings were higher- anywhere from 120- 169/70-90 range.  She called us last month with these readings and we added losartan to the metoprolol that she was already on.  She takes both in the morning.  She has tolerated the losartan and readings do seem to be better though still occasionally high in the evening 149-150 range.  She does bring her blood pressure monitor in today and her monitor is reading 8 points higher on the top than ours.    Irritability - Her sister-in-law has dementia and she and her  have needed to check on her regularly and has been very stressful.  Patient feels that she has become more irritable and harder to control her frustration.  She has never been on any antidepressants.  She is getting ready to go to [place] next month.  She does feel like when she is more active and exercises it does help some with how she feels    Insomnia-patient is on Ambien one half-1 each night.    Hyperlipidemia-patient takes Lipitor 3 times a week    Current Outpatient Medications:   •  aspirin 81 MG EC tablet, Take 243 mg by mouth Daily., Disp: , Rfl:   •  atorvastatin (LIPITOR) 10 MG tablet, Take 1 tablet by mouth Daily. (Patient taking differently: Take 10 mg by mouth 3 (Three) Times a Week.), Disp: 90 tablet, Rfl: 1  •  Coenzyme Q10 (COQ10 PO), Take 1 capsule by mouth Daily., Disp: , Rfl:  "  •  GLUCOSAMINE-CHONDROITIN PO, Take 1 tablet by mouth 3 (Three) Times a Week., Disp: , Rfl:   •  losartan (Cozaar) 25 MG tablet, Take 1 tablet by mouth Daily., Disp: 30 tablet, Rfl: 11  •  metoprolol succinate XL (TOPROL-XL) 50 MG 24 hr tablet, TAKE ONE TABLET BY MOUTH DAILY, Disp: 90 tablet, Rfl: 1  •  metroNIDAZOLE (METROCREAM) 0.75 % cream, 1 application Daily As Needed., Disp: , Rfl:   •  Omega-3 Fatty Acids (FISH OIL) 1000 MG capsule capsule, Take  by mouth Daily With Breakfast., Disp: , Rfl:   •  omeprazole (PrilOSEC) 20 MG capsule, I capsule twice a day before meals (Patient taking differently: 20 mg As Needed. I capsule twice a day before meals), Disp: 60 capsule, Rfl: 11  •  Probiotic Product (PROBIOTIC DAILY PO), Take  by mouth Daily., Disp: , Rfl:   •  zolpidem (AMBIEN) 10 MG tablet, Take 1 tablet by mouth At Night As Needed for Sleep., Disp: 30 tablet, Rfl: 2  •  escitalopram (Lexapro) 5 MG tablet, Take 1 tablet by mouth Daily., Disp: 30 tablet, Rfl: 11   The following portions of the patient's history were reviewed and updated as appropriate: allergies, current medications, past family history, past medical history, past social history, past surgical history and problem list.     Objective   Vital Signs:   /69 (BP Location: Left arm, Patient Position: Sitting) Comment: with her machine  Pulse 66   Temp 97.3 °F (36.3 °C) (Infrared)   Ht 164.3 cm (64.7\")   Wt 52.6 kg (116 lb)   SpO2 100%   BMI 19.48 kg/m²       Physical exam  Constitutional: oriented to person, place, and time.  well-developed and well-nourished. No distress.   HENT:   Head: Normocephalic and atraumatic.   Eyes: Conjunctivae and EOM are normal.   Cardiovascular: Normal rate, regular rhythm and normal heart sounds.  Exam reveals no gallop and no friction rub.    No murmur heard.  Pulmonary/Chest: Effort normal and breath sounds normal. No respiratory distress.   no wheezes.   Neurological:  alert and oriented to person, " place, and time.   Abdomen: No bruits appreciated  Skin: Skin is warm and dry. not diaphoretic.   Psychiatric:  normal mood and affect. behavior is normal. Judgment and thought content normal.      Result Review :   The following data was reviewed by: Nohelia Barriga MD on 02/16/2023:  Common labs    Common Labs 4/15/22 4/15/22 4/15/22 12/1/22 12/1/22    1003 1003 1003 1544 1544   Glucose   81  92   BUN   15  21   Creatinine   0.91  0.83   Sodium   142  141   Potassium   4.3  4.4   Chloride   104  100   Calcium   9.9  9.8   Albumin   5.10  4.90   Total Bilirubin   0.6  0.5   Alkaline Phosphatase   80  83   AST (SGOT)   22  26   ALT (SGPT)   18  22   WBC 4.06   6.69    Hemoglobin 13.7   13.6    Hematocrit 42.6   39.8    Platelets 222   246    Total Cholesterol  174      Triglycerides  70      HDL Cholesterol  67 (A)      LDL Cholesterol   94      (A) Abnormal value                        Assessment and Plan    Diagnoses and all orders for this visit:    1. Essential hypertension (Primary)- blood pressure looks good today.  Patient's cuff is reading a little bit higher than ours so she will get a new 1 and continue to monitor.  We will have her take the losartan at night as well.  When she is in [place] or in hot weather when she is sweating a lot, she may have to hold the losartan and she will monitor blood pressure regularly as she has had trouble with hypotension in the past.  We will check BMP today  -     losartan (Cozaar) 25 MG tablet; Take 1 tablet by mouth Daily.  Dispense: 30 tablet; Refill: 11  -     Basic metabolic panel; Future    2. Pure hypercholesterolemia-repeat FLP will be due next visit    3. Primary insomnia-patient uses Ambien as needed.  She has signed controlled substance contract.  Evans appropriate.  She will call when she needs a refill    4. Irritability-patient with more stress here recently which may be contributing some to her blood pressure.  We will try low-dose Lexapro and see if this  helps some with the irritability and frustration.  Side effects reviewed.  Call if any problems.  Discussed it can take up to 4 weeks to kick in.    -     escitalopram (Lexapro) 5 MG tablet; Take 1 tablet by mouth Daily.  Dispense: 30 tablet; Refill: 11        Follow Up   No follow-ups on file.  Patient was given instructions and counseling regarding her condition or for health maintenance advice. Please see specific information pulled into the AVS if appropriate.

## 2023-02-17 LAB
BUN SERPL-MCNC: 14 MG/DL (ref 8–23)
BUN/CREAT SERPL: 15.4 (ref 7–25)
CALCIUM SERPL-MCNC: 9.9 MG/DL (ref 8.6–10.5)
CHLORIDE SERPL-SCNC: 103 MMOL/L (ref 98–107)
CO2 SERPL-SCNC: 28 MMOL/L (ref 22–29)
CREAT SERPL-MCNC: 0.91 MG/DL (ref 0.57–1)
EGFRCR SERPLBLD CKD-EPI 2021: 62.7 ML/MIN/1.73
GLUCOSE SERPL-MCNC: 84 MG/DL (ref 65–99)
POTASSIUM SERPL-SCNC: 4.6 MMOL/L (ref 3.5–5.2)
SODIUM SERPL-SCNC: 143 MMOL/L (ref 136–145)

## 2023-02-27 ENCOUNTER — EXTERNAL PBMM DATA (OUTPATIENT)
Dept: PHARMACY | Facility: OTHER | Age: 83
End: 2023-02-27
Payer: MEDICARE

## 2023-03-26 DIAGNOSIS — F51.01 PRIMARY INSOMNIA: Chronic | ICD-10-CM

## 2023-03-27 RX ORDER — ZOLPIDEM TARTRATE 10 MG/1
TABLET ORAL
Qty: 30 TABLET | Refills: 1 | Status: SHIPPED | OUTPATIENT
Start: 2023-03-27

## 2023-04-10 ENCOUNTER — TELEPHONE (OUTPATIENT)
Dept: INTERNAL MEDICINE | Facility: CLINIC | Age: 83
End: 2023-04-10
Payer: MEDICARE

## 2023-04-10 NOTE — TELEPHONE ENCOUNTER
Caller: Joselyn Segura    Relationship: Self    Best call back number: [phone]    What is the best time to reach you: ANY TIME    Who are you requesting to speak with (clinical staff, provider,  specific staff member): DR MCLEOD    Do you know the name of the person who called: SELF    What was the call regarding: PATIENT STATES SHE TAKES METOPROLOL IN AM AND LOSARTAN IN EVENING. BP IS LOWER IN THE MORNING AND GETS HIGH IN THE EVENING LIKE 160/100 /120. PATIENT WAS JUST IN [place] AND IN THE HEAT IT DOES NOT ELEVATE HOWEVER IT WENT RIGHT BACK UP AS SHE RETURNED BACK TO [place]. PLEASE ADVISE    Do you require a callback: YES

## 2023-04-11 RX ORDER — LOSARTAN POTASSIUM 50 MG/1
50 TABLET ORAL DAILY
Qty: 30 TABLET | Refills: 5 | Status: SHIPPED | OUTPATIENT
Start: 2023-04-11

## 2023-04-11 NOTE — TELEPHONE ENCOUNTER
We could raise her losartan to 50mg, and if she wants to try taking the losartan at noon instead, to see if she gets better readings in the evening.   I'll go ahead and send the 50mg losartan in.

## 2023-04-17 NOTE — PROGRESS NOTES
Subsequent Medicare Wellness Visit    Subjective    Joselyn Segura is a 82 y.o. female who presents for a Subsequent Medicare Wellness Visit.    The following portions of the patient's history were reviewed and   updated as appropriate: allergies, current medications, past family history, past medical history, past social history, past surgical history and problem list.    Compared to one year ago, the patient feels her physical   health is the same.    Compared to one year ago, the patient feels her mental   health is the same.    Recent Hospitalizations:  She was not admitted to the hospital during the last year.       Current Medical Providers:  Patient Care Team:  Nohelia Barriga MD as PCP - General (Internal Medicine)  Melo Sparks MD as Consulting Physician (Gastroenterology)  Nohelia Rm MD as Consulting Physician (Dermatology)    Outpatient Medications Prior to Visit   Medication Sig Dispense Refill   • aspirin 81 MG EC tablet Take 3 tablets by mouth Daily.     • atorvastatin (LIPITOR) 10 MG tablet Take 1 tablet by mouth Daily. (Patient taking differently: Take 1 tablet by mouth 4 (Four) Times a Week.) 90 tablet 1   • Coenzyme Q10 (COQ10 PO) Take 1 capsule by mouth Daily.     • escitalopram (Lexapro) 5 MG tablet Take 1 tablet by mouth Daily. 30 tablet 11   • GLUCOSAMINE-CHONDROITIN PO Take 1 tablet by mouth 3 (Three) Times a Week.     • losartan (Cozaar) 50 MG tablet Take 1 tablet by mouth Daily. 30 tablet 5   • metoprolol succinate XL (TOPROL-XL) 50 MG 24 hr tablet TAKE ONE TABLET BY MOUTH DAILY 90 tablet 1   • metroNIDAZOLE (METROCREAM) 0.75 % cream 1 application Daily As Needed.     • Omega-3 Fatty Acids (FISH OIL) 1000 MG capsule capsule Take  by mouth Daily With Breakfast.     • omeprazole (PrilOSEC) 20 MG capsule I capsule twice a day before meals (Patient taking differently: 1 capsule As Needed. I capsule twice a day before meals) 60 capsule 11   • Probiotic Product (PROBIOTIC DAILY  "PO) Take  by mouth Daily.     • zolpidem (AMBIEN) 10 MG tablet TAKE ONE TABLET BY MOUTH ONCE NIGHTLY AS NEEDED FOR SLEEP 30 tablet 1     No facility-administered medications prior to visit.       No opioid medication identified on active medication list. I have reviewed chart for other potential  high risk medication/s and harmful drug interactions in the elderly.            Aspirin is not on active medication list.  Aspirin use is indicated based on review of current medical condition/s. Pros and cons of this therapy have been discussed with this patient. Benefits of this medication outweigh potential harm.  Patient has been instructed to start taking this medication..  She is on daily aspirin    Patient Active Problem List   Diagnosis   • Primary insomnia   • Osteopenia   • Pure hypercholesterolemia   • GERD (gastroesophageal reflux disease)   • Intermittent palpitations   • Essential hypertension   • NSVT (nonsustained ventricular tachycardia) ([facility])   • Chronic constipation     Advance Care Planning  Advance Directive is not on file.  ACP discussion was held with the patient during this visit. Patient has an advance directive (not in EMR), copy requested.     Objective    Vitals:    05/10/23 1336   BP: 124/62   BP Location: Right arm   Patient Position: Sitting   Pulse: 51   Temp: 97.1 °F (36.2 °C)   TempSrc: Infrared   SpO2: 96%   Weight: 52.2 kg (115 lb)   Height: 161.8 cm (63.7\")   PainSc: 0-No pain     Estimated body mass index is 19.93 kg/m² as calculated from the following:    Height as of this encounter: 161.8 cm (63.7\").    Weight as of this encounter: 52.2 kg (115 lb).    BMI is >= 25 and <30. (Overweight) The following options were offered after discussion;: none (medical contraindication) patient's weight is normal      Does the patient have evidence of cognitive impairment? No          HEALTH RISK ASSESSMENT    Smoking Status:  Social History     Tobacco Use   Smoking Status Former   • Packs/day: 0.30 "   • Years: 16.00   • Pack years: 4.80   • Types: Cigarettes   • Start date: 1958   • Quit date: 1974   • Years since quittin.9   Smokeless Tobacco Never     Alcohol Consumption:  Social History     Substance and Sexual Activity   Alcohol Use Yes   • Alcohol/week: 1.0 standard drink   • Types: 1 Cans of beer per week    Comment: RARE     Fall Risk Screen:    ANTONIO Fall Risk Assessment was completed, and patient is at LOW risk for falls.Assessment completed on:5/10/2023    Depression Screenin/10/2023     1:35 PM   PHQ-2/PHQ-9 Depression Screening   Little Interest or Pleasure in Doing Things 0-->not at all   Feeling Down, Depressed or Hopeless 0-->not at all   PHQ-9: Brief Depression Severity Measure Score 0       Health Habits and Functional and Cognitive Screenin/10/2023     1:34 PM   Functional & Cognitive Status   Do you have difficulty preparing food and eating? No   Do you have difficulty bathing yourself, getting dressed or grooming yourself? No   Do you have difficulty using the toilet? No   Do you have difficulty moving around from place to place? No   Do you have trouble with steps or getting out of a bed or a chair? No   Current Diet Well Balanced Diet   Dental Exam Up to date   Eye Exam Up to date   Exercise (times per week) 3 times per week   Current Exercises Include Tennis;Pickleball;Other        Exercise Comment golf   Do you need help using the phone?  No   Are you deaf or do you have serious difficulty hearing?  Yes   Do you need help with transportation? No   Do you need help shopping? No   Do you need help preparing meals?  No   Do you need help with housework?  No   Do you need help with laundry? No   Do you need help taking your medications? No   Do you need help managing money? No   Do you ever drive or ride in a car without wearing a seat belt? No   Have you felt unusual stress, anger or loneliness in the last month? No   Who do you live with? Spouse   If you  need help, do you have trouble finding someone available to you? No   Have you been bothered in the last four weeks by sexual problems? No   Do you have difficulty concentrating, remembering or making decisions? No       Age-appropriate Screening Schedule:  Refer to the list below for future screening recommendations based on patient's age, sex and/or medical conditions. Orders for these recommended tests are listed in the plan section. The patient has been provided with a written plan.    Health Maintenance   Topic Date Due   • ZOSTER VACCINE (2 of 3) 07/31/2009   • TDAP/TD VACCINES (2 - Td or Tdap) 06/02/2018   • ANNUAL WELLNESS VISIT  04/14/2023   • LIPID PANEL  04/15/2023   • INFLUENZA VACCINE  08/01/2023   • DXA SCAN  12/03/2023   • MAMMOGRAM  04/13/2024   • COVID-19 Vaccine  Completed   • Pneumococcal Vaccine 65+  Completed                CMS Preventative Services Quick Reference  Risk Factors Identified During Encounter  None Identified  The above risks/problems have been discussed with the patient.  Pertinent information has been shared with the patient in the After Visit Summary.  An After Visit Summary and PPPS were made available to the patient.    Follow Up:   Next Medicare Wellness visit to be scheduled in 1 year.       Additional E&M Note during same encounter follows:  Patient has multiple medical problems which are significant and separately identifiable that require additional work above and beyond the Medicare Wellness Visit.      Chief Complaint  Medicare Wellness-subsequent (Wellness), Annual Exam (Physical), Insomnia (F/u), Hyperlipidemia, and Hypertension (Discuss losartan.)    Subjective        HPI  Joselyn Segura is also being seen today for:    Insomnia - she is taking 1/2-1 ambien every night, and doing better recently, 8 hrs of sleep a night. She does tolerate and does not feel hung over the next day. no ETOH generally except occasionally a beer after she plays golf.     HTN - she is  "taking metoprolol 25 1 q am every day, and the losartan 25 around noon but not every day. She does feel like the BP is better when it is warm, but higher when the weather is cold.      Gastritis/GERD - she has been taking omperazole as needed, and uses about 3x/week.  Bowel movements have been more regular with probiotics daily and overall her stomach feels pain-free     Hyperlipidemia-she is taking Lipitor regularly - 4 a week.      R lower leg has some parsethias in recent months. Last r knee surgery was in '19    She takes  Fish oil, glucosamine, probiotica, co q 10 , D in winter      Review of Systems   Constitutional: Negative for activity change, appetite change, fatigue and fever.   Respiratory: Negative for shortness of breath.    Cardiovascular: Positive for palpitations. Negative for chest pain and leg swelling.   Gastrointestinal: Negative for abdominal pain and constipation (doing better).   Musculoskeletal: Positive for arthralgias. Negative for joint swelling.   Allergic/Immunologic: Negative for immunocompromised state.   Neurological: Positive for numbness. Negative for seizures, syncope, speech difficulty, weakness and confusion.       Objective   Vital Signs:  /62 (BP Location: Right arm, Patient Position: Sitting)   Pulse 51   Temp 97.1 °F (36.2 °C) (Infrared)   Ht 161.8 cm (63.7\")   Wt 52.2 kg (115 lb)   SpO2 96%   BMI 19.93 kg/m²     Physical Exam  Vitals and nursing note reviewed.   Constitutional:       General: She is not in acute distress.     Appearance: She is well-developed. She is not diaphoretic.   HENT:      Head: Normocephalic and atraumatic.      Right Ear: External ear normal.      Left Ear: External ear normal.      Nose: Nose normal.      Mouth/Throat:      Pharynx: No oropharyngeal exudate.   Eyes:      General: No scleral icterus.        Right eye: No discharge.         Left eye: No discharge.      Conjunctiva/sclera: Conjunctivae normal.      Pupils: Pupils are " equal, round, and reactive to light.   Neck:      Thyroid: No thyromegaly.   Cardiovascular:      Rate and Rhythm: Normal rate and regular rhythm.      Heart sounds: Normal heart sounds. No murmur heard.    No friction rub. No gallop.   Pulmonary:      Effort: Pulmonary effort is normal. No respiratory distress.      Breath sounds: Normal breath sounds. No wheezing or rales.   Chest:   Breasts:     Right: No mass, nipple discharge, skin change or tenderness.      Left: No mass, nipple discharge, skin change or tenderness.   Abdominal:      General: Bowel sounds are normal. There is no distension.      Palpations: Abdomen is soft. There is no mass.      Tenderness: There is no abdominal tenderness. There is no guarding or rebound.   Musculoskeletal:         General: No deformity. Normal range of motion.      Cervical back: Normal range of motion and neck supple.   Lymphadenopathy:      Cervical: No cervical adenopathy.   Skin:     General: Skin is warm and dry.      Coloration: Skin is not pale.      Findings: No erythema or rash.   Neurological:      Mental Status: She is alert and oriented to person, place, and time.      Coordination: Coordination normal.      Deep Tendon Reflexes: Reflexes normal.   Psychiatric:         Mood and Affect: Mood normal.         Behavior: Behavior normal.         Thought Content: Thought content normal.         Judgment: Judgment normal.                         Assessment and Plan   Diagnoses and all orders for this visit:    1. Medicare annual wellness visit, subsequent (Primary)  Regular exercise/healthy diet. BSE q month. Sunscreen use encouraged.   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 Routine general medical examination at a health care facility  -     CBC (No Diff); Future  -     TSH Rfx On Abnormal To Free T4; Future  -     Lipid Panel; Future  -     Comprehensive Metabolic Panel; Future  -     POC Urinalysis Dipstick, Automated    3. Primary insomnia-Ambien refilled.  She has signed controlled substance contract.  Evans appropriate    4. Pure hypercholesterolemia  -     CBC (No Diff); Future  -     TSH Rfx On Abnormal To Free T4; Future  -     Lipid Panel; Future  -     Comprehensive Metabolic Panel; Future    5. Essential hypertension- OK to hold the losartan and take only if BP is over 135. She will continue moniutoring  -     CBC (No Diff); Future  -     TSH Rfx On Abnormal To Free T4; Future  -     Lipid Panel; Future  -     Comprehensive Metabolic Panel; Future             Follow Up   No follow-ups on file.  Patient was given instructions and counseling regarding her condition or for health maintenance advice. Please see specific information pulled into the AVS if appropriate.

## 2023-05-10 ENCOUNTER — OFFICE VISIT (OUTPATIENT)
Dept: INTERNAL MEDICINE | Facility: CLINIC | Age: 83
End: 2023-05-10
Payer: MEDICARE

## 2023-05-10 VITALS
SYSTOLIC BLOOD PRESSURE: 124 MMHG | HEART RATE: 51 BPM | BODY MASS INDEX: 19.63 KG/M2 | OXYGEN SATURATION: 96 % | HEIGHT: 64 IN | DIASTOLIC BLOOD PRESSURE: 62 MMHG | WEIGHT: 115 LBS | TEMPERATURE: 97.1 F

## 2023-05-10 DIAGNOSIS — F51.01 PRIMARY INSOMNIA: Chronic | ICD-10-CM

## 2023-05-10 DIAGNOSIS — Z00.00 ROUTINE GENERAL MEDICAL EXAMINATION AT A HEALTH CARE FACILITY: ICD-10-CM

## 2023-05-10 DIAGNOSIS — Z00.00 MEDICARE ANNUAL WELLNESS VISIT, SUBSEQUENT: Primary | ICD-10-CM

## 2023-05-10 DIAGNOSIS — E78.00 PURE HYPERCHOLESTEROLEMIA: Chronic | ICD-10-CM

## 2023-05-10 DIAGNOSIS — I10 ESSENTIAL HYPERTENSION: Chronic | ICD-10-CM

## 2023-05-10 LAB
BILIRUB BLD-MCNC: NEGATIVE MG/DL
CLARITY, POC: CLEAR
COLOR UR: YELLOW
EXPIRATION DATE: ABNORMAL
GLUCOSE UR STRIP-MCNC: NEGATIVE MG/DL
KETONES UR QL: NEGATIVE
LEUKOCYTE EST, POC: NEGATIVE
Lab: ABNORMAL
NITRITE UR-MCNC: NEGATIVE MG/ML
PH UR: 5.5 [PH] (ref 5–8)
PROT UR STRIP-MCNC: NEGATIVE MG/DL
RBC # UR STRIP: ABNORMAL /UL
SP GR UR: 1.02 (ref 1–1.03)
UROBILINOGEN UR QL: NORMAL

## 2023-05-10 RX ORDER — ZOLPIDEM TARTRATE 10 MG/1
10 TABLET ORAL NIGHTLY PRN
Qty: 30 TABLET | Refills: 2 | Status: SHIPPED | OUTPATIENT
Start: 2023-05-10

## 2023-05-17 ENCOUNTER — LAB (OUTPATIENT)
Dept: INTERNAL MEDICINE | Facility: CLINIC | Age: 83
End: 2023-05-17
Payer: MEDICARE

## 2023-05-17 DIAGNOSIS — Z00.00 ROUTINE GENERAL MEDICAL EXAMINATION AT A HEALTH CARE FACILITY: ICD-10-CM

## 2023-05-17 DIAGNOSIS — I10 ESSENTIAL HYPERTENSION: Chronic | ICD-10-CM

## 2023-05-17 DIAGNOSIS — E78.00 PURE HYPERCHOLESTEROLEMIA: Chronic | ICD-10-CM

## 2023-05-17 DIAGNOSIS — R31.29 MICROSCOPIC HEMATURIA: ICD-10-CM

## 2023-05-17 LAB
ALBUMIN SERPL-MCNC: 4.7 G/DL (ref 3.5–5.2)
ALBUMIN/GLOB SERPL: 2.6 G/DL
ALP SERPL-CCNC: 87 U/L (ref 39–117)
ALT SERPL-CCNC: 21 U/L (ref 1–33)
AST SERPL-CCNC: 19 U/L (ref 1–32)
BILIRUB SERPL-MCNC: 0.8 MG/DL (ref 0–1.2)
BUN SERPL-MCNC: 13 MG/DL (ref 8–23)
BUN/CREAT SERPL: 12.9 (ref 7–25)
CALCIUM SERPL-MCNC: 10.3 MG/DL (ref 8.6–10.5)
CHLORIDE SERPL-SCNC: 104 MMOL/L (ref 98–107)
CHOLEST SERPL-MCNC: 171 MG/DL (ref 0–200)
CO2 SERPL-SCNC: 29.2 MMOL/L (ref 22–29)
CREAT SERPL-MCNC: 1.01 MG/DL (ref 0.57–1)
EGFRCR SERPLBLD CKD-EPI 2021: 55.3 ML/MIN/1.73
ERYTHROCYTE [DISTWIDTH] IN BLOOD BY AUTOMATED COUNT: 12.2 % (ref 12.3–15.4)
GLOBULIN SER CALC-MCNC: 1.8 GM/DL
GLUCOSE SERPL-MCNC: 91 MG/DL (ref 65–99)
HCT VFR BLD AUTO: 39.6 % (ref 34–46.6)
HDLC SERPL-MCNC: 72 MG/DL (ref 40–60)
HGB BLD-MCNC: 13.2 G/DL (ref 12–15.9)
LDLC SERPL CALC-MCNC: 83 MG/DL (ref 0–100)
MCH RBC QN AUTO: 29.4 PG (ref 26.6–33)
MCHC RBC AUTO-ENTMCNC: 33.3 G/DL (ref 31.5–35.7)
MCV RBC AUTO: 88.2 FL (ref 79–97)
PLATELET # BLD AUTO: 216 10*3/MM3 (ref 140–450)
POTASSIUM SERPL-SCNC: 4.3 MMOL/L (ref 3.5–5.2)
PROT SERPL-MCNC: 6.5 G/DL (ref 6–8.5)
RBC # BLD AUTO: 4.49 10*6/MM3 (ref 3.77–5.28)
SODIUM SERPL-SCNC: 141 MMOL/L (ref 136–145)
TRIGL SERPL-MCNC: 90 MG/DL (ref 0–150)
TSH SERPL DL<=0.005 MIU/L-ACNC: 1.97 UIU/ML (ref 0.27–4.2)
VLDLC SERPL CALC-MCNC: 16 MG/DL (ref 5–40)
WBC # BLD AUTO: 5.29 10*3/MM3 (ref 3.4–10.8)

## 2023-05-17 PROCEDURE — 36415 COLL VENOUS BLD VENIPUNCTURE: CPT | Performed by: INTERNAL MEDICINE

## 2023-05-19 LAB
BACTERIA UR CULT: NORMAL
BACTERIA UR CULT: NORMAL

## 2023-07-27 ENCOUNTER — EXTERNAL PBMM DATA (OUTPATIENT)
Dept: PHARMACY | Facility: OTHER | Age: 83
End: 2023-07-27
Payer: MEDICARE

## 2023-08-03 ENCOUNTER — TRANSCRIBE ORDERS (OUTPATIENT)
Dept: ADMINISTRATIVE | Facility: HOSPITAL | Age: 83
End: 2023-08-03
Payer: MEDICARE

## 2023-08-03 DIAGNOSIS — Z12.31 SCREENING MAMMOGRAM FOR BREAST CANCER: Primary | ICD-10-CM

## 2023-08-09 ENCOUNTER — APPOINTMENT (OUTPATIENT)
Dept: OTHER | Facility: HOSPITAL | Age: 83
End: 2023-08-09
Payer: MEDICARE

## 2023-08-09 ENCOUNTER — HOSPITAL ENCOUNTER (OUTPATIENT)
Dept: MAMMOGRAPHY | Facility: HOSPITAL | Age: 83
Discharge: HOME | End: 2023-08-09
Payer: MEDICARE

## 2023-08-09 DIAGNOSIS — Z12.31 SCREENING MAMMOGRAM FOR BREAST CANCER: ICD-10-CM

## 2023-08-09 PROCEDURE — 77067 SCR MAMMO BI INCL CAD: CPT

## 2023-08-09 PROCEDURE — 77063 BREAST TOMOSYNTHESIS BI: CPT

## 2023-08-19 DIAGNOSIS — F51.01 PRIMARY INSOMNIA: Chronic | ICD-10-CM

## 2023-08-21 RX ORDER — ZOLPIDEM TARTRATE 10 MG/1
TABLET ORAL
Qty: 30 TABLET | OUTPATIENT
Start: 2023-08-21

## 2023-08-21 NOTE — TELEPHONE ENCOUNTER
LV: 5/10/23  NV: 11/09/23  LF: 5/10/23 30/2    Patient does not need the ambien at this time she has a refill.  She did make an appointment.

## 2023-08-31 ENCOUNTER — OFFICE VISIT (OUTPATIENT)
Dept: INTERNAL MEDICINE | Facility: CLINIC | Age: 83
End: 2023-08-31
Payer: MEDICARE

## 2023-08-31 VITALS
DIASTOLIC BLOOD PRESSURE: 72 MMHG | BODY MASS INDEX: 19.81 KG/M2 | SYSTOLIC BLOOD PRESSURE: 142 MMHG | OXYGEN SATURATION: 96 % | TEMPERATURE: 97.3 F | HEIGHT: 64 IN | HEART RATE: 62 BPM | WEIGHT: 116 LBS

## 2023-08-31 DIAGNOSIS — F51.01 PRIMARY INSOMNIA: Primary | Chronic | ICD-10-CM

## 2023-08-31 DIAGNOSIS — I10 ESSENTIAL HYPERTENSION: Chronic | ICD-10-CM

## 2023-08-31 DIAGNOSIS — E78.00 PURE HYPERCHOLESTEROLEMIA: Chronic | ICD-10-CM

## 2023-08-31 PROCEDURE — 99213 OFFICE O/P EST LOW 20 MIN: CPT | Performed by: INTERNAL MEDICINE

## 2023-08-31 PROCEDURE — 3078F DIAST BP <80 MM HG: CPT | Performed by: INTERNAL MEDICINE

## 2023-08-31 PROCEDURE — 1160F RVW MEDS BY RX/DR IN RCRD: CPT | Performed by: INTERNAL MEDICINE

## 2023-08-31 PROCEDURE — 3077F SYST BP >= 140 MM HG: CPT | Performed by: INTERNAL MEDICINE

## 2023-08-31 PROCEDURE — 1159F MED LIST DOCD IN RCRD: CPT | Performed by: INTERNAL MEDICINE

## 2023-08-31 RX ORDER — ZOLPIDEM TARTRATE 10 MG/1
10 TABLET ORAL NIGHTLY PRN
Qty: 30 TABLET | Refills: 2 | Status: SHIPPED | OUTPATIENT
Start: 2023-08-31

## 2023-08-31 NOTE — PROGRESS NOTES
Chief Complaint  Insomnia    Subjective          Joselyn Segura presents to [facility]  History of Present Illness    Insomnia-patient is on Ambien 10 mg-usually takes 1/2-1 nightly.     Hypertension-patient on metoprolol 25 mg in the morning; she stopped losartan 25 as her BPs were low, even as low as 83/53. She finds that in the summer/in hot weather that they tend to run lower. She checks regularly, every 2-3 days. Has not had metoprolol yet - she usually takes w/ her first meal of the day    Hyperlipidemia-patient on Lipitor four days a week    Constipation - going 5-6d/week - using Mg and probiotic daily and this regimen seems to work well    B hips hurt and R knee - not quite ready to see ortho. Plays tennis and golf regularly and manages OK      Current Outpatient Medications:     aspirin 81 MG EC tablet, Take 3 tablets by mouth Daily., Disp: , Rfl:     atorvastatin (LIPITOR) 10 MG tablet, Take 1 tablet by mouth Daily. (Patient taking differently: Take 1 tablet by mouth 4 (Four) Times a Week.), Disp: 90 tablet, Rfl: 1    Coenzyme Q10 (COQ10 PO), Take 1 capsule by mouth Daily., Disp: , Rfl:     escitalopram (Lexapro) 5 MG tablet, Take 1 tablet by mouth Daily., Disp: 30 tablet, Rfl: 11    GLUCOSAMINE-CHONDROITIN PO, Take 1 tablet by mouth 3 (Three) Times a Week., Disp: , Rfl:     metoprolol succinate XL (TOPROL-XL) 50 MG 24 hr tablet, TAKE ONE TABLET BY MOUTH DAILY, Disp: 90 tablet, Rfl: 1    metroNIDAZOLE (METROCREAM) 0.75 % cream, 1 application  Daily As Needed., Disp: , Rfl:     Omega-3 Fatty Acids (FISH OIL) 1000 MG capsule capsule, Take  by mouth Daily With Breakfast., Disp: , Rfl:     omeprazole (PrilOSEC) 20 MG capsule, I capsule twice a day before meals (Patient taking differently: 1 capsule As Needed. I capsule twice a day before meals), Disp: 60 capsule, Rfl: 11    Probiotic Product (PROBIOTIC DAILY PO), Take  by mouth Daily., Disp: , Rfl:     zolpidem (AMBIEN) 10 MG  "tablet, Take 1 tablet by mouth At Night As Needed for Sleep., Disp: 30 tablet, Rfl: 2    losartan (Cozaar) 50 MG tablet, Take 1 tablet by mouth Daily. (Patient not taking: Reported on 8/31/2023), Disp: 30 tablet, Rfl: 5   The following portions of the patient's history were reviewed and updated as appropriate: allergies, current medications, past family history, past medical history, past social history, past surgical history and problem list.     Objective   Vital Signs:   /72 (BP Location: Right arm, Patient Position: Sitting) Comment: has not had medication today  Pulse 62   Temp 97.3 øF (36.3 øC) (Infrared)   Ht 161.8 cm (63.7\")   Wt 52.6 kg (116 lb)   SpO2 96%   BMI 20.10 kg/mý       Physical exam  Constitutional: oriented to person, place, and time.  well-developed and well-nourished. No distress.   HENT:   Head: Normocephalic and atraumatic.   Eyes: Conjunctivae and EOM are normal.   Cardiovascular: Normal rate, regular rhythm and normal heart sounds.  Exam reveals no gallop and no friction rub.    No murmur heard.  Pulmonary/Chest: Effort normal and breath sounds normal. No respiratory distress.   no wheezes.   Neurological:  alert and oriented to person, place, and time.   Abd: soft, NTND  Skin: Skin is warm and dry. not diaphoretic.   Psychiatric:  normal mood and affect. behavior is normal. Judgment and thought content normal.    Recheck /68, HR 60    Result Review :   The following data was reviewed by: Nohelia Barriga MD on 08/31/2023:  CMP          12/1/2022    15:44 2/16/2023    11:50 5/17/2023    09:58   CMP   Glucose 92  84  91    BUN 21  14  13    Creatinine 0.83  0.91  1.01    EGFR 70.0      Sodium 141  143  141    Potassium 4.4  4.6  4.3    Chloride 100  103  104    Calcium 9.8  9.9  10.3    Total Protein   6.5    Total Protein 7.2      Albumin 4.90   4.7    Globulin   1.8    Globulin 2.3      Total Bilirubin 0.5   0.8    Alkaline Phosphatase 83   87    AST (SGOT) 26   19    ALT " (SGPT) 22   21    Albumin/Globulin Ratio 2.1      BUN/Creatinine Ratio 25.3  15.4  12.9    Anion Gap 11.7        Lipid Panel          5/17/2023    09:58   Lipid Panel   Total Cholesterol 171    Triglycerides 90    HDL Cholesterol 72    VLDL Cholesterol 16    LDL Cholesterol  83      TSH          5/17/2023    09:58   TSH   TSH 1.970      Lab Results   Component Value Date    BRTB63SH 43.4 04/15/2022               Assessment and Plan    Diagnoses and all orders for this visit:    1. Primary insomnia (Primary) -stable on ambien. Tolerates well and usually on 1/2 tablet. Pt has already signed controlled substance contract. Evans done today and appropriate.   -     zolpidem (AMBIEN) 10 MG tablet; Take 1 tablet by mouth At Night As Needed for Sleep.  Dispense: 30 tablet; Refill: 2    2. Pure hypercholesterolemia- recheck in 5/24    3. Essential hypertension- a little high today but home readings are good. She will continue to monitor and she will restart losartan if readings do stay in the 140s        Follow Up   Return in about 3 months (around 11/30/2023).  Patient was given instructions and counseling regarding her condition or for health maintenance advice. Please see specific information pulled into the AVS if appropriate.

## 2023-11-09 ENCOUNTER — LAB (OUTPATIENT)
Dept: INTERNAL MEDICINE | Facility: CLINIC | Age: 83
End: 2023-11-09
Payer: MEDICARE

## 2023-11-09 ENCOUNTER — OFFICE VISIT (OUTPATIENT)
Dept: INTERNAL MEDICINE | Facility: CLINIC | Age: 83
End: 2023-11-09
Payer: MEDICARE

## 2023-11-09 VITALS
DIASTOLIC BLOOD PRESSURE: 74 MMHG | OXYGEN SATURATION: 96 % | TEMPERATURE: 97.3 F | HEIGHT: 64 IN | BODY MASS INDEX: 19.81 KG/M2 | HEART RATE: 56 BPM | SYSTOLIC BLOOD PRESSURE: 140 MMHG | WEIGHT: 116 LBS

## 2023-11-09 DIAGNOSIS — M17.11 ARTHRITIS OF RIGHT KNEE: ICD-10-CM

## 2023-11-09 DIAGNOSIS — E78.00 PURE HYPERCHOLESTEROLEMIA: Chronic | ICD-10-CM

## 2023-11-09 DIAGNOSIS — M79.604 RIGHT LEG PAIN: ICD-10-CM

## 2023-11-09 DIAGNOSIS — Z23 NEED FOR INFLUENZA VACCINATION: ICD-10-CM

## 2023-11-09 DIAGNOSIS — I10 ESSENTIAL HYPERTENSION: Chronic | ICD-10-CM

## 2023-11-09 DIAGNOSIS — I10 ESSENTIAL HYPERTENSION: Primary | Chronic | ICD-10-CM

## 2023-11-09 DIAGNOSIS — F51.01 PRIMARY INSOMNIA: Chronic | ICD-10-CM

## 2023-11-09 PROCEDURE — 36415 COLL VENOUS BLD VENIPUNCTURE: CPT | Performed by: INTERNAL MEDICINE

## 2023-11-09 PROCEDURE — 1159F MED LIST DOCD IN RCRD: CPT | Performed by: INTERNAL MEDICINE

## 2023-11-09 PROCEDURE — 99214 OFFICE O/P EST MOD 30 MIN: CPT | Performed by: INTERNAL MEDICINE

## 2023-11-09 PROCEDURE — 3077F SYST BP >= 140 MM HG: CPT | Performed by: INTERNAL MEDICINE

## 2023-11-09 PROCEDURE — 90662 IIV NO PRSV INCREASED AG IM: CPT | Performed by: INTERNAL MEDICINE

## 2023-11-09 PROCEDURE — 1160F RVW MEDS BY RX/DR IN RCRD: CPT | Performed by: INTERNAL MEDICINE

## 2023-11-09 PROCEDURE — G0008 ADMIN INFLUENZA VIRUS VAC: HCPCS | Performed by: INTERNAL MEDICINE

## 2023-11-09 PROCEDURE — 3078F DIAST BP <80 MM HG: CPT | Performed by: INTERNAL MEDICINE

## 2023-11-09 RX ORDER — ATORVASTATIN CALCIUM 10 MG/1
10 TABLET, FILM COATED ORAL DAILY
Qty: 90 TABLET | Refills: 1 | Status: SHIPPED | OUTPATIENT
Start: 2023-11-09

## 2023-11-09 RX ORDER — ZOLPIDEM TARTRATE 10 MG/1
10 TABLET ORAL NIGHTLY PRN
Qty: 30 TABLET | Refills: 2 | Status: SHIPPED | OUTPATIENT
Start: 2023-11-09

## 2023-11-09 RX ORDER — METOPROLOL SUCCINATE 50 MG/1
50 TABLET, EXTENDED RELEASE ORAL DAILY
Qty: 90 TABLET | Refills: 1 | Status: SHIPPED | OUTPATIENT
Start: 2023-11-09

## 2023-11-09 NOTE — PROGRESS NOTES
Chief Complaint  Insomnia (F/u), Hypertension, Hyperlipidemia, Med Refill, and foot pain (In right foot.)    Subjective          Joselyn Segura presents to [facility]  History of Present Illness    insomnia-patient is on Ambien 10 mg-usually takes 1/2-1 nightly. Doing well w/ this.     Hypertension-patient on metoprolol 25 mg in the morning; no longer on losartan. She finds that in the summer/in hot weather that they tend to run lower. She checks regularly and during the summer, Had been in the 120 range at home, but last night was 150     Hyperlipidemia-patient on Lipitor four days a week. Last FLP was 5/23     Constipation - going 5-6d/week - using Mg and probiotic daily and this regimen seems to work well     R foot pain - feels pain on dorsum on medial side, initially into shin, has been several months. Maybe was a little swollen but not now.  Relief has been worse in the morning, even when she is still in bed and lessens throughout the day.  not worse w/ weight bearing.   Did feel some tingling as well in the same area  No OTC  Does have R knee pain/right knee arthritis-has had 2 arthroscopies in the knee gives her trouble from time to time  She is not using any OTC        Current Outpatient Medications:     aspirin 81 MG EC tablet, Take 3 tablets by mouth Daily., Disp: , Rfl:     atorvastatin (LIPITOR) 10 MG tablet, Take 1 tablet by mouth Daily., Disp: 90 tablet, Rfl: 1    Coenzyme Q10 (COQ10 PO), Take 1 capsule by mouth Daily., Disp: , Rfl:     escitalopram (Lexapro) 5 MG tablet, Take 1 tablet by mouth Daily., Disp: 30 tablet, Rfl: 11    GLUCOSAMINE-CHONDROITIN PO, Take 1 tablet by mouth 3 (Three) Times a Week., Disp: , Rfl:     metoprolol succinate XL (TOPROL-XL) 50 MG 24 hr tablet, Take 1 tablet by mouth Daily., Disp: 90 tablet, Rfl: 1    metroNIDAZOLE (METROCREAM) 0.75 % cream, 1 application  Daily As Needed., Disp: , Rfl:     Omega-3 Fatty Acids (FISH OIL) 1000 MG capsule  "capsule, Take  by mouth Daily With Breakfast., Disp: , Rfl:     omeprazole (PrilOSEC) 20 MG capsule, I capsule twice a day before meals (Patient taking differently: 1 capsule As Needed. I capsule twice a day before meals), Disp: 60 capsule, Rfl: 11    Probiotic Product (PROBIOTIC DAILY PO), Take  by mouth Daily., Disp: , Rfl:     zolpidem (AMBIEN) 10 MG tablet, Take 1 tablet by mouth At Night As Needed for Sleep., Disp: 30 tablet, Rfl: 2    losartan (Cozaar) 50 MG tablet, Take 1 tablet by mouth Daily., Disp: 30 tablet, Rfl: 5   The following portions of the patient's history were reviewed and updated as appropriate: allergies, current medications, past family history, past medical history, past social history, past surgical history and problem list.     Objective   Vital Signs:   /74 (BP Location: Left arm, Patient Position: Sitting)   Pulse 56   Temp 97.3 °F (36.3 °C) (Infrared)   Ht 161.8 cm (63.7\")   Wt 52.6 kg (116 lb)   SpO2 96%   BMI 20.10 kg/m²       Physical exam  Constitutional: oriented to person, place, and time.  well-developed and well-nourished. No distress.   HENT:   Head: Normocephalic and atraumatic.   Eyes: Conjunctivae and EOM are normal.   Cardiovascular: Normal rate, regular rhythm and normal heart sounds.  Exam reveals no gallop and no friction rub.    No murmur heard.  Pulmonary/Chest: Effort normal and breath sounds normal. No respiratory distress.   no wheezes.   Neurological:  alert and oriented to person, place, and time.   Skin: Skin is warm and dry. not diaphoretic.   Abd: soft, ntnd  MS: r knee crepitus, no edema. R lower extremity normal appearance  PV: pedal pulse 2+ R foot  Psychiatric:  normal mood and affect. behavior is normal. Judgment and thought content normal.      Result Review :   The following data was reviewed by: Nohelia Barriga MD on 11/09/2023:  CMP          12/1/2022    15:44 2/16/2023    11:50 5/17/2023    09:58   CMP   Glucose 92  84  91    BUN 21  14  13 "    Creatinine 0.83  0.91  1.01    EGFR 70.0      Sodium 141  143  141    Potassium 4.4  4.6  4.3    Chloride 100  103  104    Calcium 9.8  9.9  10.3    Total Protein   6.5    Total Protein 7.2      Albumin 4.90   4.7    Globulin   1.8    Globulin 2.3      Total Bilirubin 0.5   0.8    Alkaline Phosphatase 83   87    AST (SGOT) 26   19    ALT (SGPT) 22   21    Albumin/Globulin Ratio 2.1      BUN/Creatinine Ratio 25.3  15.4  12.9    Anion Gap 11.7        Lipid Panel          5/17/2023    09:58   Lipid Panel   Total Cholesterol 171    Triglycerides 90    HDL Cholesterol 72    VLDL Cholesterol 16    LDL Cholesterol  83      TSH          5/17/2023    09:58   TSH   TSH 1.970      Lab Results   Component Value Date    [id number] 43.4 04/15/2022               Assessment and Plan    Diagnoses and all orders for this visit:    1. Essential hypertension (Primary)-blood pressure up a little bit today and does tend to run higher during the winter months.  She will go ahead and restart the losartan along with the metoprolol.  She has some at home and will let me know when she needs a refill  -     Comprehensive metabolic panel; Future    2. Pure hypercholesterolemia-FLP will be due next year.  Check metabolic panel today  -     atorvastatin (LIPITOR) 10 MG tablet; Take 1 tablet by mouth Daily.  Dispense: 90 tablet; Refill: 1    3. Primary insomnia-stable on Ambien.  She is she has signed controlled substance contract.  Evans appropriate  -     zolpidem (AMBIEN) 10 MG tablet; Take 1 tablet by mouth At Night As Needed for Sleep.  Dispense: 30 tablet; Refill: 2    4. Need for influenza vaccination  -     Fluzone High-Dose 65+yrs (0439-1444)    5. Arthritis of right knee  -     XR Knee 3 View Right; Future    6. Right leg pain/foot pain-does seem like a paresthesia or nerve compression.  We will start with an x-ray of the knee  -     XR Knee 3 View Right; Future    Other orders  -     metoprolol succinate XL (TOPROL-XL) 50 MG 24 hr  tablet; Take 1 tablet by mouth Daily.  Dispense: 90 tablet; Refill: 1    Preventative-she will get her COVID-vaccine at the pharmacy    Follow Up   Return in about 3 months (around 2/9/2024) for Next scheduled follow up.  Patient was given instructions and counseling regarding her condition or for health maintenance advice. Please see specific information pulled into the AVS if appropriate.

## 2023-11-10 LAB
ALBUMIN SERPL-MCNC: 4.8 G/DL (ref 3.5–5.2)
ALBUMIN/GLOB SERPL: 2.5 G/DL
ALP SERPL-CCNC: 81 U/L (ref 39–117)
ALT SERPL-CCNC: 19 U/L (ref 1–33)
AST SERPL-CCNC: 22 U/L (ref 1–32)
BILIRUB SERPL-MCNC: 0.9 MG/DL (ref 0–1.2)
BUN SERPL-MCNC: 13 MG/DL (ref 8–23)
BUN/CREAT SERPL: 15.1 (ref 7–25)
CALCIUM SERPL-MCNC: 10.4 MG/DL (ref 8.6–10.5)
CHLORIDE SERPL-SCNC: 103 MMOL/L (ref 98–107)
CO2 SERPL-SCNC: 29.1 MMOL/L (ref 22–29)
CREAT SERPL-MCNC: 0.86 MG/DL (ref 0.57–1)
EGFRCR SERPLBLD CKD-EPI 2021: 66.7 ML/MIN/1.73
GLOBULIN SER CALC-MCNC: 1.9 GM/DL
GLUCOSE SERPL-MCNC: 94 MG/DL (ref 65–99)
POTASSIUM SERPL-SCNC: 4.7 MMOL/L (ref 3.5–5.2)
PROT SERPL-MCNC: 6.7 G/DL (ref 6–8.5)
SODIUM SERPL-SCNC: 142 MMOL/L (ref 136–145)

## 2024-01-08 ENCOUNTER — TELEPHONE (OUTPATIENT)
Dept: INTERNAL MEDICINE | Facility: CLINIC | Age: 84
End: 2024-01-08

## 2024-01-08 NOTE — TELEPHONE ENCOUNTER
Caller: Joselyn Segura    Relationship: Self    Best call back number: [phone]    What specialty or service is being requested:     X-RAY OF RIGHT KNEE    What is the office location: [place]    Any additional details:     PATIENT NOT SURE IF SHE HAS BEEN SCHEDULED.  SHE HAS CHANGED INSURANCE TO [employer].    SINCE SHE HAS [employer] SHE DID  NOT HAVE X-RAY DONE    SHE WOULD LIKE TO GO TO [place] IF WE HAVE A LOCATION THERE    SHE THINKS SHE HAD A MAMMOGRAM

## 2024-01-09 NOTE — TELEPHONE ENCOUNTER
Patient has been notified that order is in the system.  She plans on getting this done sometime this week.

## 2024-01-16 ENCOUNTER — HOSPITAL ENCOUNTER (OUTPATIENT)
Dept: GENERAL RADIOLOGY | Facility: HOSPITAL | Age: 84
Discharge: HOME | End: 2024-01-16
Admitting: INTERNAL MEDICINE
Payer: MEDICARE

## 2024-01-16 DIAGNOSIS — M79.604 RIGHT LEG PAIN: ICD-10-CM

## 2024-01-16 DIAGNOSIS — M17.11 ARTHRITIS OF RIGHT KNEE: ICD-10-CM

## 2024-01-16 PROCEDURE — 73562 X-RAY EXAM OF KNEE 3: CPT

## 2024-02-19 DIAGNOSIS — F51.01 PRIMARY INSOMNIA: Chronic | ICD-10-CM

## 2024-02-19 RX ORDER — ZOLPIDEM TARTRATE 10 MG/1
10 TABLET ORAL NIGHTLY PRN
Qty: 30 TABLET | Refills: 0 | Status: SHIPPED | OUTPATIENT
Start: 2024-02-19

## 2024-02-19 NOTE — TELEPHONE ENCOUNTER
Caller: Joselyn Segura    Relationship: Self    Best call back number: [phone]     Requested Prescriptions:   Requested Prescriptions     Pending Prescriptions Disp Refills    zolpidem (AMBIEN) 10 MG tablet 30 tablet 2     Sig: Take 1 tablet by mouth At Night As Needed for Sleep.        Pharmacy where request should be sent: [id number] [id number] [place] [address] [phone] [facility] [phone] [facility]     Last office visit with prescribing clinician: 11/9/2023   Last telemedicine visit with prescribing clinician: Visit date not found   Next office visit with prescribing clinician: 3/14/2024     Additional details provided by patient:      Does the patient have less than a 3 day supply:  [] Yes  [x] No    Would you like a call back once the refill request has been completed: [] Yes [x] No    If the office needs to give you a call back, can they leave a voicemail: [] Yes [x] No    Ghazala Treviño   02/19/24 10:26 EST

## 2024-03-04 NOTE — PROGRESS NOTES
Follow-up Visit      Date: 2024  Patient Name: Joselyn Segura  : 1940   MRN: [id number]     Chief Complaint:    Chief Complaint   Patient presents with    Essential hypertension       History of Present Illness: Joselyn Segura is a 83 y.o. female who is here today for follow-up on her high blood pressure and palpitations.  Patient has a history of high blood pressure and especially in winter it goes very high.  In summer it drops down to 80s to 90s.  She has been taking her Cozaar as needed in summer and keep on taking in winter.  She denies any chest pain any shortness of breath any dizziness any palpitations or any other symptoms.      Problem List     CARDIAC  Coronary Artery Disease:   Stress echo, 2012: Normal  Stress test 2019: Normal    Myocardium:   Echo, 2019: LVEF 70%    Valvular:   Mild aortic sclerosis, MAC    Electrical:   Normal sinus rhythm, Holter monitor 2019 4 beats run of V. tach    Percardium:   Normal    CARDIAC RISK FACTORS:  Hypertension  Dyslipidemia  2023  TG 90 HDL 72 LDL 83  Tobacco Use remote history     NON-CARDIAC:  GERD  Osteopenia  Insomnia    SURGERIES:  Left ACL reconstruction  Total abdominal hysterectomy with salpingo-oophorectomy  Basal cell carcinoma excision  Right knee arthroscopy      Subjective      Review of Systems:   Review of Systems   Respiratory: Negative.     Cardiovascular: Negative.        Medications:     Current Outpatient Medications:     aspirin 81 MG EC tablet, Take 3 tablets by mouth Daily., Disp: , Rfl:     atorvastatin (LIPITOR) 10 MG tablet, Take 1 tablet by mouth Daily., Disp: 90 tablet, Rfl: 1    Coenzyme Q10 (COQ10 PO), Take 1 capsule by mouth Daily., Disp: , Rfl:     escitalopram (Lexapro) 5 MG tablet, Take 1 tablet by mouth Daily., Disp: 30 tablet, Rfl: 11    GLUCOSAMINE-CHONDROITIN PO, Take 1 tablet by mouth 3 (Three) Times a Week., Disp: , Rfl:     metoprolol succinate XL (TOPROL-XL) 50 MG 24 hr  "tablet, Take 1 tablet by mouth Daily., Disp: 90 tablet, Rfl: 1    metroNIDAZOLE (METROCREAM) 0.75 % cream, 1 Application Daily As Needed., Disp: , Rfl:     Omega-3 Fatty Acids (FISH OIL) 1000 MG capsule capsule, Take  by mouth Daily With Breakfast., Disp: , Rfl:     Probiotic Product (PROBIOTIC DAILY PO), Take  by mouth Daily., Disp: , Rfl:     zolpidem (AMBIEN) 10 MG tablet, Take 1 tablet by mouth At Night As Needed for Sleep., Disp: 30 tablet, Rfl: 0    Allergies:   Allergies   Allergen Reactions    Ace Inhibitors Cough    Lisinopril Cough    Sulfadiazine Other (See Comments)     Mother told her she was allergic as a child.    Trazodone Irritability    Premarin [Conjugated Estrogens] Other (See Comments)     Not sure    Sulfa Antibiotics Rash and Other (See Comments)       Objective     Physical Exam:  Vitals:    03/05/24 1530   BP: 124/60   BP Location: Right arm   Patient Position: Sitting   Cuff Size: Adult   Pulse: 56   SpO2: 97%   Weight: 52.2 kg (115 lb)   Height: 162.6 cm (64\")     Body mass index is 19.74 kg/m².      Constitutional:       General: Not in acute distress.     Appearance: Healthy appearance. Not in distress.     Neck:     JVP: Not elevated     Carotid artery: No carotid bruit    Pulmonary:      Effort: Pulmonary effort is normal.      Breath sounds: Normal breath sounds. No wheezing. No rhonchi. No rales.     Cardiovascular:      Regular rate.  Regular rhythm. Normal S1. Normal S2.      Murmurs: There is no significant murmur.      No gallop. No click. No rub.     Abdominal:      General: Bowel sounds are normal.      Palpations: Abdomen is soft.      Tenderness: There is no abdominal tenderness.    Extremities:     Pulses: Good pulses     Edema: No edema    Smoking Cessation:   Tobacco Product History : Patient quit smoking in 1974 after 15 pack years.       Lab Review:   Lab Results   Component Value Date    GLUCOSE 94 11/09/2023    BUN 13 11/09/2023    CREATININE 0.86 11/09/2023    " EGFRIFNONA 68 10/04/2021    EGFRIFAFRI 82 10/04/2021    BCR 15.1 11/09/2023    K 4.7 11/09/2023    CO2 29.1 (H) 11/09/2023    CALCIUM 10.4 11/09/2023    PROTENTOTREF 6.7 11/09/2023    ALBUMIN 4.8 11/09/2023    LABIL2 2.5 11/09/2023    AST 22 11/09/2023    ALT 19 11/09/2023     Lab Results   Component Value Date    WBC 5.29 05/17/2023    HGB 13.2 05/17/2023    HCT 39.6 05/17/2023    MCV 88.2 05/17/2023     05/17/2023     Lab Results   Component Value Date    TSH 1.970 05/17/2023           ECG 12 Lead    Date/Time: 3/5/2024 4:01 PM  Performed by: Red Kay MD    Authorized by: Red Kay MD  Comparison: compared with previous ECG from 12/6/2022  Comparison to previous ECG: Suydam rhythm has been replaced with sinus bradycardia  Rhythm: sinus bradycardia  Other findings: non-specific ST-T wave changes    Clinical impression: normal ECG           Assessment / Plan      Assessment:   Diagnosis Plan   1. Essential hypertension        2. Pure hypercholesterolemia             Plan:  I have advised her to check her blood pressure carefully in summer and try to take Cozaar on as needed basis.  Patient cholesterol has been running good and she is going to continue taking her Lipitor.  I have emphasized to her that if her blood pressure start running high please contact our office.      Follow Up:       Return in about 1 year (around 3/5/2025).    Red Kay MD

## 2024-03-05 ENCOUNTER — OFFICE VISIT (OUTPATIENT)
Dept: CARDIOLOGY | Facility: CLINIC | Age: 84
End: 2024-03-05
Payer: MEDICARE

## 2024-03-05 VITALS
HEIGHT: 64 IN | DIASTOLIC BLOOD PRESSURE: 60 MMHG | BODY MASS INDEX: 19.63 KG/M2 | WEIGHT: 115 LBS | OXYGEN SATURATION: 97 % | HEART RATE: 56 BPM | SYSTOLIC BLOOD PRESSURE: 124 MMHG

## 2024-03-05 DIAGNOSIS — E78.00 PURE HYPERCHOLESTEROLEMIA: Chronic | ICD-10-CM

## 2024-03-05 DIAGNOSIS — I10 ESSENTIAL HYPERTENSION: Primary | ICD-10-CM

## 2024-03-05 PROCEDURE — 99214 OFFICE O/P EST MOD 30 MIN: CPT | Performed by: INTERNAL MEDICINE

## 2024-03-05 PROCEDURE — 3078F DIAST BP <80 MM HG: CPT | Performed by: INTERNAL MEDICINE

## 2024-03-05 PROCEDURE — 93000 ELECTROCARDIOGRAM COMPLETE: CPT | Performed by: INTERNAL MEDICINE

## 2024-03-05 PROCEDURE — 3074F SYST BP LT 130 MM HG: CPT | Performed by: INTERNAL MEDICINE

## 2024-03-06 DIAGNOSIS — F51.01 PRIMARY INSOMNIA: Chronic | ICD-10-CM

## 2024-03-06 RX ORDER — ZOLPIDEM TARTRATE 10 MG/1
10 TABLET ORAL NIGHTLY PRN
Qty: 30 TABLET | OUTPATIENT
Start: 2024-03-06

## 2024-03-14 ENCOUNTER — LAB (OUTPATIENT)
Dept: INTERNAL MEDICINE | Facility: CLINIC | Age: 84
End: 2024-03-14
Payer: MEDICARE

## 2024-03-14 ENCOUNTER — OFFICE VISIT (OUTPATIENT)
Dept: INTERNAL MEDICINE | Facility: CLINIC | Age: 84
End: 2024-03-14
Payer: MEDICARE

## 2024-03-14 VITALS
BODY MASS INDEX: 19.46 KG/M2 | RESPIRATION RATE: 14 BRPM | HEART RATE: 64 BPM | WEIGHT: 114 LBS | DIASTOLIC BLOOD PRESSURE: 72 MMHG | OXYGEN SATURATION: 97 % | TEMPERATURE: 97.1 F | HEIGHT: 64 IN | SYSTOLIC BLOOD PRESSURE: 134 MMHG

## 2024-03-14 DIAGNOSIS — F51.01 PRIMARY INSOMNIA: Primary | Chronic | ICD-10-CM

## 2024-03-14 DIAGNOSIS — E78.00 PURE HYPERCHOLESTEROLEMIA: Chronic | ICD-10-CM

## 2024-03-14 DIAGNOSIS — I10 ESSENTIAL HYPERTENSION: Chronic | ICD-10-CM

## 2024-03-14 LAB
ALBUMIN SERPL-MCNC: 4.7 G/DL (ref 3.5–5.2)
ALBUMIN/GLOB SERPL: 2 G/DL
ALP SERPL-CCNC: 92 U/L (ref 39–117)
ALT SERPL W P-5'-P-CCNC: 24 U/L (ref 1–33)
ANION GAP SERPL CALCULATED.3IONS-SCNC: 12 MMOL/L (ref 5–15)
AST SERPL-CCNC: 28 U/L (ref 1–32)
BILIRUB SERPL-MCNC: 0.7 MG/DL (ref 0–1.2)
BUN SERPL-MCNC: 14 MG/DL (ref 8–23)
BUN/CREAT SERPL: 14.3 (ref 7–25)
CALCIUM SPEC-SCNC: 10.1 MG/DL (ref 8.6–10.5)
CHLORIDE SERPL-SCNC: 102 MMOL/L (ref 98–107)
CHOLEST SERPL-MCNC: 181 MG/DL (ref 0–200)
CO2 SERPL-SCNC: 27 MMOL/L (ref 22–29)
CREAT SERPL-MCNC: 0.98 MG/DL (ref 0.57–1)
DEPRECATED RDW RBC AUTO: 41 FL (ref 37–54)
EGFRCR SERPLBLD CKD-EPI 2021: 57 ML/MIN/1.73
ERYTHROCYTE [DISTWIDTH] IN BLOOD BY AUTOMATED COUNT: 12.3 % (ref 12.3–15.4)
GLOBULIN UR ELPH-MCNC: 2.4 GM/DL
GLUCOSE SERPL-MCNC: 81 MG/DL (ref 65–99)
HCT VFR BLD AUTO: 43.2 % (ref 34–46.6)
HDLC SERPL-MCNC: 69 MG/DL (ref 40–60)
HGB BLD-MCNC: 14 G/DL (ref 12–15.9)
LDLC SERPL CALC-MCNC: 94 MG/DL (ref 0–100)
LDLC/HDLC SERPL: 1.33 {RATIO}
MCH RBC QN AUTO: 29.1 PG (ref 26.6–33)
MCHC RBC AUTO-ENTMCNC: 32.4 G/DL (ref 31.5–35.7)
MCV RBC AUTO: 89.8 FL (ref 79–97)
PLATELET # BLD AUTO: 242 10*3/MM3 (ref 140–450)
PMV BLD AUTO: 11.2 FL (ref 6–12)
POTASSIUM SERPL-SCNC: 4.4 MMOL/L (ref 3.5–5.2)
PROT SERPL-MCNC: 7.1 G/DL (ref 6–8.5)
RBC # BLD AUTO: 4.81 10*6/MM3 (ref 3.77–5.28)
SODIUM SERPL-SCNC: 141 MMOL/L (ref 136–145)
TRIGL SERPL-MCNC: 101 MG/DL (ref 0–150)
VLDLC SERPL-MCNC: 18 MG/DL (ref 5–40)
WBC NRBC COR # BLD AUTO: 5.39 10*3/MM3 (ref 3.4–10.8)

## 2024-03-14 PROCEDURE — 80053 COMPREHEN METABOLIC PANEL: CPT | Performed by: INTERNAL MEDICINE

## 2024-03-14 PROCEDURE — 85027 COMPLETE CBC AUTOMATED: CPT | Performed by: INTERNAL MEDICINE

## 2024-03-14 PROCEDURE — 1159F MED LIST DOCD IN RCRD: CPT | Performed by: INTERNAL MEDICINE

## 2024-03-14 PROCEDURE — 3075F SYST BP GE 130 - 139MM HG: CPT | Performed by: INTERNAL MEDICINE

## 2024-03-14 PROCEDURE — 80061 LIPID PANEL: CPT | Performed by: INTERNAL MEDICINE

## 2024-03-14 PROCEDURE — 36415 COLL VENOUS BLD VENIPUNCTURE: CPT | Performed by: INTERNAL MEDICINE

## 2024-03-14 PROCEDURE — 99214 OFFICE O/P EST MOD 30 MIN: CPT | Performed by: INTERNAL MEDICINE

## 2024-03-14 PROCEDURE — 1160F RVW MEDS BY RX/DR IN RCRD: CPT | Performed by: INTERNAL MEDICINE

## 2024-03-14 PROCEDURE — 3078F DIAST BP <80 MM HG: CPT | Performed by: INTERNAL MEDICINE

## 2024-03-14 RX ORDER — ESCITALOPRAM OXALATE 5 MG/1
5 TABLET ORAL DAILY
Qty: 30 TABLET | Refills: 11 | Status: SHIPPED | OUTPATIENT
Start: 2024-03-14

## 2024-03-14 RX ORDER — ZOLPIDEM TARTRATE 10 MG/1
10 TABLET ORAL NIGHTLY PRN
Qty: 30 TABLET | Refills: 2 | Status: SHIPPED | OUTPATIENT
Start: 2024-03-14

## 2024-03-14 NOTE — PROGRESS NOTES
Chief Complaint  Hypertension, Hyperlipidemia, and Insomnia    Subjective          Joselyn Segura presents to [facility]  History of Present Illness    Insomnia-she is on Ambien 10 mg.  Takes 1/2-1 nightly.  She has been on this for many years and has worked well with minimal side effects  She filled in [place] last month and will need a refill    Hypertension-she is on metoprolol 50 and uses losartan more in the winter months when her blood pressures are higher.  She is just on metoprolol now      Hyperlipidemia-Patient is on Lipitor 4d/week    Burning feeling in top of foot R to big toe-has not worsened any since last visit and does not bother her much    Anxiety-doing well with the Lexapro 5 mg, would like to continue.  Has had quite a bit of stress-her son had viral encephalitis in January and is still recovering.   has to have a vascular procedure with     Current Outpatient Medications:     aspirin 81 MG EC tablet, Take 3 tablets by mouth Daily., Disp: , Rfl:     atorvastatin (LIPITOR) 10 MG tablet, Take 1 tablet by mouth Daily., Disp: 90 tablet, Rfl: 1    Coenzyme Q10 (COQ10 PO), Take 1 capsule by mouth Daily., Disp: , Rfl:     escitalopram (Lexapro) 5 MG tablet, Take 1 tablet by mouth Daily., Disp: 30 tablet, Rfl: 11    GLUCOSAMINE-CHONDROITIN PO, Take 1 tablet by mouth 3 (Three) Times a Week., Disp: , Rfl:     metoprolol succinate XL (TOPROL-XL) 50 MG 24 hr tablet, Take 1 tablet by mouth Daily., Disp: 90 tablet, Rfl: 1    metroNIDAZOLE (METROCREAM) 0.75 % cream, 1 Application Daily As Needed., Disp: , Rfl:     Omega-3 Fatty Acids (FISH OIL) 1000 MG capsule capsule, Take  by mouth Daily With Breakfast., Disp: , Rfl:     Probiotic Product (PROBIOTIC DAILY PO), Take  by mouth Daily., Disp: , Rfl:     zolpidem (AMBIEN) 10 MG tablet, Take 1 tablet by mouth At Night As Needed for Sleep., Disp: 30 tablet, Rfl: 2   The following portions of the patient's history were  "reviewed and updated as appropriate: allergies, current medications, past family history, past medical history, past social history, past surgical history and problem list.     Objective   Vital Signs:   /72 (BP Location: Right arm, Patient Position: Sitting, Cuff Size: Adult) Comment: Last took BP meds yesterday morning  Pulse 64   Temp 97.1 °F (36.2 °C) (Infrared)   Resp 14   Ht 162.6 cm (64.02\")   Wt 51.7 kg (114 lb)   SpO2 97%   BMI 19.56 kg/m²       Physical exam  Constitutional: oriented to person, place, and time.  well-developed and well-nourished. No distress.   HENT:   Head: Normocephalic and atraumatic.   Eyes: Conjunctivae and EOM are normal.   Cardiovascular: Normal rate, regular rhythm and normal heart sounds.  Exam reveals no gallop and no friction rub.    No murmur heard.  Pulmonary/Chest: Effort normal and breath sounds normal. No respiratory distress.   no wheezes.   Neurological:  alert and oriented to person, place, and time.   Skin: Skin is warm and dry. not diaphoretic.   PV: 2+ pedal pulses, no edema, no deformities in feet  Psychiatric:  normal mood and affect. behavior is normal. Judgment and thought content normal.      Result Review :   The following data was reviewed by: Nohelia Barriga MD on 03/14/2024:  CMP          5/17/2023    09:58 11/9/2023    10:16   CMP   Glucose 91  94    BUN 13  13    Creatinine 1.01  0.86    Sodium 141  142    Potassium 4.3  4.7    Chloride 104  103    Calcium 10.3  10.4    Total Protein 6.5  6.7    Albumin 4.7  4.8    Globulin 1.8  1.9    Total Bilirubin 0.8  0.9    Alkaline Phosphatase 87  81    AST (SGOT) 19  22    ALT (SGPT) 21  19    BUN/Creatinine Ratio 12.9  15.1      Lipid Panel          5/17/2023    09:58   Lipid Panel   Total Cholesterol 171    Triglycerides 90    HDL Cholesterol 72    VLDL Cholesterol 16    LDL Cholesterol  83      TSH          5/17/2023    09:58   TSH   TSH 1.970      Lab Results   Component Value Date    PFVY84IY 43.4 " 04/15/2022               Assessment and Plan    Diagnoses and all orders for this visit:    1. Primary insomnia (Primary)-stable with Ambien.  She has tolerated well and other sleep medications have not worked for patient.  She has signed controlled substance contract.  Evans appropriate  -     zolpidem (AMBIEN) 10 MG tablet; Take 1 tablet by mouth At Night As Needed for Sleep.  Dispense: 30 tablet; Refill: 2    2. Pure hypercholesterolemia-check levels  -     Comprehensive Metabolic Panel; Future  -     Lipid Panel; Future  -     CBC (No Diff); Future    3. Essential hypertension-good control on current regimen-does metoprolol year-round and losartan just in the winter months    Other orders  -     escitalopram (Lexapro) 5 MG tablet; Take 1 tablet by mouth Daily.  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  She is in good health overall so could go ahead and get 1 more colonoscopy just to be on the safe side.  She will think about it and will either call us or Dr. Sparks's office to schedule    Follow Up   Return in about 3 months (around 6/14/2024) for Next scheduled follow up.  Patient was given instructions and counseling regarding her condition or for health maintenance advice. Please see specific information pulled into the AVS if appropriate.

## 2024-05-10 ENCOUNTER — OFFICE VISIT (OUTPATIENT)
Dept: INTERNAL MEDICINE | Facility: CLINIC | Age: 84
End: 2024-05-10
Payer: MEDICARE

## 2024-05-10 VITALS
RESPIRATION RATE: 20 BRPM | HEART RATE: 56 BPM | OXYGEN SATURATION: 98 % | DIASTOLIC BLOOD PRESSURE: 84 MMHG | BODY MASS INDEX: 20.14 KG/M2 | TEMPERATURE: 97.6 F | HEIGHT: 64 IN | WEIGHT: 118 LBS | SYSTOLIC BLOOD PRESSURE: 126 MMHG

## 2024-05-10 DIAGNOSIS — K62.5 RECTAL BLEED: Primary | ICD-10-CM

## 2024-05-10 DIAGNOSIS — K64.9 HEMORRHOIDS, UNSPECIFIED HEMORRHOID TYPE: ICD-10-CM

## 2024-05-10 PROCEDURE — 3074F SYST BP LT 130 MM HG: CPT | Performed by: PHYSICIAN ASSISTANT

## 2024-05-10 PROCEDURE — 1159F MED LIST DOCD IN RCRD: CPT | Performed by: PHYSICIAN ASSISTANT

## 2024-05-10 PROCEDURE — 3079F DIAST BP 80-89 MM HG: CPT | Performed by: PHYSICIAN ASSISTANT

## 2024-05-10 PROCEDURE — 99214 OFFICE O/P EST MOD 30 MIN: CPT | Performed by: PHYSICIAN ASSISTANT

## 2024-05-10 PROCEDURE — 1126F AMNT PAIN NOTED NONE PRSNT: CPT | Performed by: PHYSICIAN ASSISTANT

## 2024-05-10 PROCEDURE — 1160F RVW MEDS BY RX/DR IN RCRD: CPT | Performed by: PHYSICIAN ASSISTANT

## 2024-05-10 RX ORDER — HYDROCORTISONE ACETATE 25 MG/1
25 SUPPOSITORY RECTAL 2 TIMES DAILY PRN
Qty: 12 EACH | Refills: 1 | Status: SHIPPED | OUTPATIENT
Start: 2024-05-10

## 2024-05-16 ENCOUNTER — TELEPHONE (OUTPATIENT)
Dept: GASTROENTEROLOGY | Facility: CLINIC | Age: 84
End: 2024-05-16

## 2024-05-16 NOTE — TELEPHONE ENCOUNTER
Caller: BEBA TAY    Relationship to patient: SELF    Best call back number: [phone]    Chief complaint: R/S NEEDED    Type of visit: COLONOSCOPY    Requested date:     If rescheduling, when is the original appointment: 6.6     Additional notes:

## 2024-06-04 RX ORDER — SODIUM, POTASSIUM,MAG SULFATES 17.5-3.13G
1 SOLUTION, RECONSTITUTED, ORAL ORAL TAKE AS DIRECTED
Qty: 354 ML | Refills: 0 | Status: SHIPPED | OUTPATIENT
Start: 2024-06-04

## 2024-06-06 ENCOUNTER — TELEPHONE (OUTPATIENT)
Dept: GASTROENTEROLOGY | Facility: CLINIC | Age: 84
End: 2024-06-06

## 2024-06-06 NOTE — TELEPHONE ENCOUNTER
Caller: Joselyn Segura    Relationship: Self    Best call back number: [phone]     Who are you requesting to speak with (clinical staff, provider,  specific staff member): NON CLINICAL     What was the call regarding: PATIENT IS SCHEDULED FOR A COLONOSCOPY WITH DR MAE ON 6/11 AND THEY CALLED TO CONFIRM APPT.

## 2024-06-11 ENCOUNTER — OUTSIDE FACILITY SERVICE (OUTPATIENT)
Dept: GASTROENTEROLOGY | Facility: CLINIC | Age: 84
End: 2024-06-11
Payer: MEDICARE

## 2024-06-11 PROCEDURE — 88305 TISSUE EXAM BY PATHOLOGIST: CPT | Performed by: INTERNAL MEDICINE

## 2024-06-12 ENCOUNTER — LAB REQUISITION (OUTPATIENT)
Dept: LAB | Facility: HOSPITAL | Age: 84
End: 2024-06-12
Payer: MEDICARE

## 2024-06-12 DIAGNOSIS — K62.5 HEMORRHAGE OF ANUS AND RECTUM: ICD-10-CM

## 2024-06-13 ENCOUNTER — OFFICE VISIT (OUTPATIENT)
Dept: INTERNAL MEDICINE | Facility: CLINIC | Age: 84
End: 2024-06-13
Payer: MEDICARE

## 2024-06-13 VITALS
BODY MASS INDEX: 19.29 KG/M2 | DIASTOLIC BLOOD PRESSURE: 72 MMHG | OXYGEN SATURATION: 94 % | HEART RATE: 56 BPM | SYSTOLIC BLOOD PRESSURE: 138 MMHG | HEIGHT: 64 IN | TEMPERATURE: 97.1 F | RESPIRATION RATE: 14 BRPM | WEIGHT: 113 LBS

## 2024-06-13 DIAGNOSIS — E78.00 PURE HYPERCHOLESTEROLEMIA: Chronic | ICD-10-CM

## 2024-06-13 DIAGNOSIS — K62.5 RECTAL BLEED: ICD-10-CM

## 2024-06-13 DIAGNOSIS — F51.01 PRIMARY INSOMNIA: Primary | Chronic | ICD-10-CM

## 2024-06-13 DIAGNOSIS — I10 ESSENTIAL HYPERTENSION: Chronic | ICD-10-CM

## 2024-06-13 LAB — REF LAB TEST METHOD: NORMAL

## 2024-06-13 PROCEDURE — 1126F AMNT PAIN NOTED NONE PRSNT: CPT | Performed by: INTERNAL MEDICINE

## 2024-06-13 PROCEDURE — 1160F RVW MEDS BY RX/DR IN RCRD: CPT | Performed by: INTERNAL MEDICINE

## 2024-06-13 PROCEDURE — G2211 COMPLEX E/M VISIT ADD ON: HCPCS | Performed by: INTERNAL MEDICINE

## 2024-06-13 PROCEDURE — 3078F DIAST BP <80 MM HG: CPT | Performed by: INTERNAL MEDICINE

## 2024-06-13 PROCEDURE — 99214 OFFICE O/P EST MOD 30 MIN: CPT | Performed by: INTERNAL MEDICINE

## 2024-06-13 PROCEDURE — 3075F SYST BP GE 130 - 139MM HG: CPT | Performed by: INTERNAL MEDICINE

## 2024-06-13 PROCEDURE — 1159F MED LIST DOCD IN RCRD: CPT | Performed by: INTERNAL MEDICINE

## 2024-06-13 RX ORDER — ZOLPIDEM TARTRATE 10 MG/1
10 TABLET ORAL NIGHTLY PRN
Qty: 30 TABLET | Refills: 2 | Status: SHIPPED | OUTPATIENT
Start: 2024-06-13

## 2024-06-13 RX ORDER — ATORVASTATIN CALCIUM 10 MG/1
10 TABLET, FILM COATED ORAL DAILY
Qty: 90 TABLET | Refills: 1 | Status: SHIPPED | OUTPATIENT
Start: 2024-06-13

## 2024-06-13 NOTE — PROGRESS NOTES
Chief Complaint  Insomnia, Hypertension, and Hyperlipidemia    Subjective          Joselyn Segura presents to [facility]  History of Present Illness    Insomnia-patient has been on Ambien 10 mg, 1/2-1 nightly for many years.  This continues to work well with no signs.  We have tried other agents in the past with minimal success and she has tolerated this well so plan is to leave her on this.  She last filled on 5/21/24    Hypertension-on metoprolol 50 and does losartan during the winter when blood pressure tends to be higher  She did have a recent week where BP was very low and patient did feel fatigued. She held a few days of the metoprolol.  Other than the heat, cannot really tell what makes it drop low    Hyperlipidemia-on Lipitor 4 days a week.  Last FLP was 3/24 and LDL was 94    Anxiety-on Lexapro 5    Rectal bleeding-patient did have colonoscopy with Dr. Sparks yesterday which showed 2 polyps. She still has a little bleeding but has slowed down.  She does feel like she was straining when she had the blood  She does do an ASA 4 days a week      Current Outpatient Medications:     aspirin 81 MG EC tablet, Take 3 tablets by mouth Daily., Disp: , Rfl:     atorvastatin (LIPITOR) 10 MG tablet, Take 1 tablet by mouth Daily., Disp: 90 tablet, Rfl: 1    Coenzyme Q10 (COQ10 PO), Take 1 capsule by mouth Daily., Disp: , Rfl:     escitalopram (Lexapro) 5 MG tablet, Take 1 tablet by mouth Daily., Disp: 30 tablet, Rfl: 11    GLUCOSAMINE-CHONDROITIN PO, Take 1 tablet by mouth 3 (Three) Times a Week., Disp: , Rfl:     hydrocortisone (ANUSOL-HC) 25 MG suppository, Insert 1 suppository into the rectum 2 (Two) Times a Day As Needed for Hemorrhoids., Disp: 12 each, Rfl: 1    metoprolol succinate XL (TOPROL-XL) 50 MG 24 hr tablet, Take 1 tablet by mouth Daily., Disp: 90 tablet, Rfl: 1    Omega-3 Fatty Acids (FISH OIL) 1000 MG capsule capsule, Take  by mouth Daily With Breakfast., Disp: , Rfl:      "Probiotic Product (PROBIOTIC DAILY PO), Take  by mouth Daily., Disp: , Rfl:     zolpidem (AMBIEN) 10 MG tablet, Take 1 tablet by mouth At Night As Needed for Sleep., Disp: 30 tablet, Rfl: 2    metroNIDAZOLE (METROCREAM) 0.75 % cream, 1 Application Daily As Needed. (Patient not taking: Reported on 6/13/2024), Disp: , Rfl:    The following portions of the patient's history were reviewed and updated as appropriate: allergies, current medications, past family history, past medical history, past social history, past surgical history and problem list.     Objective   Vital Signs:   /72 (BP Location: Right arm, Patient Position: Sitting, Cuff Size: Adult)   Pulse 56   Temp 97.1 °F (36.2 °C) (Infrared)   Resp 14   Ht 162.6 cm (64.02\")   Wt 51.3 kg (113 lb)   SpO2 94%   BMI 19.39 kg/m²       Physical exam  Constitutional: oriented to person, place, and time.  well-developed and well-nourished. No distress.   HENT:   Head: Normocephalic and atraumatic.   Eyes: Conjunctivae and EOM are normal.   Cardiovascular: Normal rate, regular rhythm and normal heart sounds.  Exam reveals no gallop and no friction rub.    No murmur heard.  Pulmonary/Chest: Effort normal and breath sounds normal. No respiratory distress.   no wheezes.   Neurological:  alert and oriented to person, place, and time.   Abdomen: Soft, no significant tenderness or distention  Skin: Skin is warm and dry. not diaphoretic.   Psychiatric:  normal mood and affect. behavior is normal. Judgment and thought content normal.      Result Review :   The following data was reviewed by: Nohelia Barriga MD on 06/13/2024:  CMP          11/9/2023    10:16 3/14/2024    11:34   CMP   Glucose 94  81    BUN 13  14    Creatinine 0.86  0.98    EGFR  57.0    Sodium 142  141    Potassium 4.7  4.4    Chloride 103  102    Calcium 10.4  10.1    Total Protein 6.7     Total Protein  7.1    Albumin 4.8  4.7    Globulin 1.9     Globulin  2.4    Total Bilirubin 0.9  0.7  "   Alkaline Phosphatase 81  92    AST (SGOT) 22  28    ALT (SGPT) 19  24    Albumin/Globulin Ratio  2.0    BUN/Creatinine Ratio 15.1  14.3    Anion Gap  12.0      CBC          3/14/2024    11:34   CBC   WBC 5.39    RBC 4.81    Hemoglobin 14.0    Hematocrit 43.2    MCV 89.8    MCH 29.1    MCHC 32.4    RDW 12.3    Platelets 242      Lipid Panel          3/14/2024    11:34   Lipid Panel   Total Cholesterol 181    Triglycerides 101    HDL Cholesterol 69    VLDL Cholesterol 18    LDL Cholesterol  94    LDL/HDL Ratio 1.33                  Assessment and Plan    Diagnoses and all orders for this visit:    1. Primary insomnia (Primary)-stable on Ambien.Pt has already signed controlled substance contract. Evans done today and appropriate.   -     zolpidem (AMBIEN) 10 MG tablet; Take 1 tablet by mouth At Night As Needed for Sleep.  Dispense: 30 tablet; Refill: 2    2. Pure hypercholesterolemia-recheck 3/25  -     atorvastatin (LIPITOR) 10 MG tablet; Take 1 tablet by mouth Daily.  Dispense: 90 tablet; Refill: 1    3. Essential hypertension-patient does tend to have a lot of fluctuations in her readings.  Okay to do half dose of metoprolol on the days when blood pressure is low.  She usually adds back the losartan in the winter months when blood pressure tends to be a little higher.  Discussed also to be sure to stay well-hydrated and can also take a salty snack on days blood pressure is low    4. Rectal bleed-recent colonoscopy was reassuring, polyps found and pathology pending        Follow Up   Return in about 3 months (around 9/13/2024) for Medicare Wellness.  Patient was given instructions and counseling regarding her condition or for health maintenance advice. Please see specific information pulled into the AVS if appropriate.

## 2024-06-20 ENCOUNTER — TELEPHONE (OUTPATIENT)
Dept: GASTROENTEROLOGY | Facility: CLINIC | Age: 84
End: 2024-06-20
Payer: MEDICARE

## 2024-06-20 ENCOUNTER — HOSPITAL ENCOUNTER (EMERGENCY)
Facility: HOSPITAL | Age: 84
Discharge: HOME | End: 2024-06-20
Attending: EMERGENCY MEDICINE
Payer: MEDICARE

## 2024-06-20 VITALS
BODY MASS INDEX: 19.12 KG/M2 | HEIGHT: 64 IN | SYSTOLIC BLOOD PRESSURE: 175 MMHG | HEART RATE: 52 BPM | RESPIRATION RATE: 18 BRPM | DIASTOLIC BLOOD PRESSURE: 89 MMHG | TEMPERATURE: 99.1 F | WEIGHT: 112 LBS | OXYGEN SATURATION: 98 %

## 2024-06-20 DIAGNOSIS — K92.2 LOWER GI BLEEDING: Primary | ICD-10-CM

## 2024-06-20 LAB
ABO GROUP BLD: NORMAL
ALBUMIN SERPL-MCNC: 4.4 G/DL (ref 3.5–5.2)
ALBUMIN/GLOB SERPL: 1.6 G/DL
ALP SERPL-CCNC: 83 U/L (ref 39–117)
ALT SERPL W P-5'-P-CCNC: 17 U/L (ref 1–33)
ANION GAP SERPL CALCULATED.3IONS-SCNC: 8 MMOL/L (ref 5–15)
AST SERPL-CCNC: 25 U/L (ref 1–32)
BASOPHILS # BLD AUTO: 0.04 10*3/MM3 (ref 0–0.2)
BASOPHILS NFR BLD AUTO: 0.7 % (ref 0–1.5)
BILIRUB SERPL-MCNC: 0.3 MG/DL (ref 0–1.2)
BLD GP AB SCN SERPL QL: NEGATIVE
BUN SERPL-MCNC: 17 MG/DL (ref 8–23)
BUN/CREAT SERPL: 18.3 (ref 7–25)
CALCIUM SPEC-SCNC: 10.1 MG/DL (ref 8.6–10.5)
CHLORIDE SERPL-SCNC: 103 MMOL/L (ref 98–107)
CO2 SERPL-SCNC: 29 MMOL/L (ref 22–29)
CREAT SERPL-MCNC: 0.93 MG/DL (ref 0.57–1)
D-LACTATE SERPL-SCNC: 0.6 MMOL/L (ref 0.5–2)
DEPRECATED RDW RBC AUTO: 40.6 FL (ref 37–54)
EGFRCR SERPLBLD CKD-EPI 2021: 60.7 ML/MIN/1.73
EOSINOPHIL # BLD AUTO: 0.12 10*3/MM3 (ref 0–0.4)
EOSINOPHIL NFR BLD AUTO: 2 % (ref 0.3–6.2)
ERYTHROCYTE [DISTWIDTH] IN BLOOD BY AUTOMATED COUNT: 12.5 % (ref 12.3–15.4)
GLOBULIN UR ELPH-MCNC: 2.7 GM/DL
GLUCOSE SERPL-MCNC: 100 MG/DL (ref 65–99)
HCT VFR BLD AUTO: 40.8 % (ref 34–46.6)
HGB BLD-MCNC: 13.3 G/DL (ref 12–15.9)
HOLD SPECIMEN: NORMAL
IMM GRANULOCYTES # BLD AUTO: 0.01 10*3/MM3 (ref 0–0.05)
IMM GRANULOCYTES NFR BLD AUTO: 0.2 % (ref 0–0.5)
LYMPHOCYTES # BLD AUTO: 1.74 10*3/MM3 (ref 0.7–3.1)
LYMPHOCYTES NFR BLD AUTO: 29.4 % (ref 19.6–45.3)
MCH RBC QN AUTO: 28.8 PG (ref 26.6–33)
MCHC RBC AUTO-ENTMCNC: 32.6 G/DL (ref 31.5–35.7)
MCV RBC AUTO: 88.3 FL (ref 79–97)
MONOCYTES # BLD AUTO: 0.64 10*3/MM3 (ref 0.1–0.9)
MONOCYTES NFR BLD AUTO: 10.8 % (ref 5–12)
NEUTROPHILS NFR BLD AUTO: 3.36 10*3/MM3 (ref 1.7–7)
NEUTROPHILS NFR BLD AUTO: 56.9 % (ref 42.7–76)
NRBC BLD AUTO-RTO: 0 /100 WBC (ref 0–0.2)
PLATELET # BLD AUTO: 228 10*3/MM3 (ref 140–450)
PMV BLD AUTO: 10.2 FL (ref 6–12)
POTASSIUM SERPL-SCNC: 4.4 MMOL/L (ref 3.5–5.2)
PROT SERPL-MCNC: 7.1 G/DL (ref 6–8.5)
RBC # BLD AUTO: 4.62 10*6/MM3 (ref 3.77–5.28)
RH BLD: POSITIVE
SODIUM SERPL-SCNC: 140 MMOL/L (ref 136–145)
T&S EXPIRATION DATE: NORMAL
WBC NRBC COR # BLD AUTO: 5.91 10*3/MM3 (ref 3.4–10.8)
WHOLE BLOOD HOLD COAG: NORMAL
WHOLE BLOOD HOLD SPECIMEN: NORMAL

## 2024-06-20 PROCEDURE — 86850 RBC ANTIBODY SCREEN: CPT | Performed by: EMERGENCY MEDICINE

## 2024-06-20 PROCEDURE — 36415 COLL VENOUS BLD VENIPUNCTURE: CPT

## 2024-06-20 PROCEDURE — 80053 COMPREHEN METABOLIC PANEL: CPT | Performed by: EMERGENCY MEDICINE

## 2024-06-20 PROCEDURE — 83605 ASSAY OF LACTIC ACID: CPT | Performed by: EMERGENCY MEDICINE

## 2024-06-20 PROCEDURE — 86901 BLOOD TYPING SEROLOGIC RH(D): CPT | Performed by: EMERGENCY MEDICINE

## 2024-06-20 PROCEDURE — 85025 COMPLETE CBC W/AUTO DIFF WBC: CPT | Performed by: EMERGENCY MEDICINE

## 2024-06-20 PROCEDURE — 86900 BLOOD TYPING SEROLOGIC ABO: CPT | Performed by: EMERGENCY MEDICINE

## 2024-06-20 PROCEDURE — 99283 EMERGENCY DEPT VISIT LOW MDM: CPT

## 2024-06-20 RX ORDER — SODIUM CHLORIDE 0.9 % (FLUSH) 0.9 %
10 SYRINGE (ML) INJECTION AS NEEDED
Status: DISCONTINUED | OUTPATIENT
Start: 2024-06-20 | End: 2024-06-20 | Stop reason: HOSPADM

## 2024-06-20 NOTE — TELEPHONE ENCOUNTER
I called the patient back- she has been having up to 4 bowel movements a day since and stated that the toilet was completely red with blood after she goes to the bathroom. I advised the patient that the safest thing to do would be to head to [facility] ED this evening vs. waiting until in the morning. The patient verbalized understanding. Notified Dr. Sparks, who will notify the inpatient GI team/ED that the patient will be coming in.

## 2024-06-20 NOTE — ED PROVIDER NOTES
Subjective   History of Present Illness    Pt presents with rectal bleeding.  Onset a couple of days ago.  She denies any abdominal pain, fever, vomiting or diarrhea.  She has some mild lightheadedness when standing/walking today.     She had a colonoscopy 8 days ago with polypectomy. The indication for the scope was bleeding. She says she was told to expect some bleeding afterward but she is worried this is too much.  She is not anticoagulated.      History provided by:  Patient      Review of Systems   Respiratory:  Negative for shortness of breath.    Cardiovascular:  Negative for chest pain.   Gastrointestinal:  Positive for anal bleeding. Negative for abdominal pain and vomiting.   All other systems reviewed and are negative.      Past Medical History:   Diagnosis Date    Acid reflux     Carotid bruit 06/2000    left, US normal, repat duplex 8/13 - mild B 0-29% stenosis    Cystadenoma     Endometriosis     with subsequent surgery    Esophagitis 05/2015    EGD - gastritis, HH    Gastritis     H/O bone density study 04/2016 6/2010, 8/13 - worsening osteopenia; 4/16 stahble osteopenia, recheck in 2 years     H/O colonoscopy 09/2012 4/2002 normal; 2012: normal ([facility]) repeat 10 years    H/O mammogram 06/21/2018    [facility]    Hyperlipidemia     Hypertension     Insomnia     Optic neuritis 02/2000    Palpitations 12/2012    normal stress echo EKG T inv V2V3    Pap smear for cervical cancer screening 08/2012    BMG 6/28/10 normal    Pelvic mass     SVT (supraventricular tachycardia) 2017    on holter       Allergies   Allergen Reactions    Ace Inhibitors Cough    Lisinopril Cough    Sulfadiazine Other (See Comments)     Mother told her she was allergic as a child.    Trazodone Irritability    Premarin [Conjugated Estrogens] Other (See Comments)     Not sure    Sulfa Antibiotics Rash and Other (See Comments)       Past Surgical History:   Procedure Laterality Date    BASAL CELL CARCINOMA EXCISION  12/2014    [facility]  [facility], Dr. Rm, Dr. Richard at     COLONOSCOPY W/ BIOPSIES AND POLYPECTOMY  2020    Dr. Sparks    ENDOSCOPY  2015    KNEE ACL RECONSTRUCTION Left 1985    injury with subsequent surgery    KNEE ARTHROSCOPY Right 05/10/2018    Dr. Sachin Merino    TOTAL ABDOMINAL HYSTERECTOMY WITH SALPINGO OOPHORECTOMY  10/2010    large pelvic mass - s/p excision, Dr. Goodman - cystadenoma L ovary, leiomyoma (benign)       Family History   Problem Relation Age of Onset    Alzheimer's disease Mother         80's    Heart disease Mother          age 86    Hyperlipidemia Mother     Atrial fibrillation Mother     Liver cancer Father     Hypertension Sister     Ulcers Sister     Atrial fibrillation Brother     No Known Problems Maternal Grandmother     No Known Problems Paternal Grandmother     No Known Problems Maternal Grandfather     No Known Problems Paternal Grandfather     Breast cancer Neg Hx     Ovarian cancer Neg Hx        Social History     Socioeconomic History    Marital status:    Tobacco Use    Smoking status: Former     Current packs/day: 0.00     Average packs/day: 0.3 packs/day for 16.0 years (4.8 ttl pk-yrs)     Types: Cigarettes     Start date: 1958     Quit date: 1974     Years since quittin.1    Smokeless tobacco: Never   Vaping Use    Vaping status: Never Used   Substance and Sexual Activity    Alcohol use: Yes     Alcohol/week: 1.0 standard drink of alcohol     Types: 1 Cans of beer per week     Comment: RARE    Drug use: No    Sexual activity: Defer           Objective   Physical Exam  Vitals and nursing note reviewed.   Constitutional:       General: She is not in acute distress.     Appearance: Normal appearance. She is not ill-appearing.   HENT:      Head: Normocephalic and atraumatic.      Mouth/Throat:      Mouth: Mucous membranes are moist.   Eyes:      General: No scleral icterus.        Right eye: No discharge.         Left eye: No discharge.       Conjunctiva/sclera: Conjunctivae normal.   Cardiovascular:      Rate and Rhythm: Normal rate and regular rhythm.      Heart sounds: No murmur heard.  Pulmonary:      Effort: Pulmonary effort is normal. No respiratory distress.      Breath sounds: Normal breath sounds. No wheezing.   Abdominal:      General: Bowel sounds are normal. There is no distension.      Palpations: Abdomen is soft.      Tenderness: There is no abdominal tenderness. There is no guarding or rebound.   Musculoskeletal:         General: No swelling. Normal range of motion.      Cervical back: Normal range of motion and neck supple.   Skin:     General: Skin is warm and dry.      Findings: No rash.   Neurological:      General: No focal deficit present.      Mental Status: She is alert and oriented to person, place, and time. Mental status is at baseline.   Psychiatric:         Mood and Affect: Mood normal.         Behavior: Behavior normal.         Thought Content: Thought content normal.         Procedures           ED Course    Labs are benign.  Hemoglobin is similar to priors in Epic, within the range over the last three years.    She is not orthostatic.  Not hypotensive or tachycardic.    [facility] GI Dr Brunner called before patient arrival to notify us about her and stated that if pt's hemoglobin was stable he recommended outpatient care.    Patient stable on serial rechecks.  Discussed findings, concerns, plan of care, expected course, reasons to return and followup.  Provided the opportunity to ask questions.    Discussed reasons to return in detail particularly but not limited to fainting, worsening weakness, worsening bleeding, fast heartbeat or low blood pressure.  Contact Dr Sparks's office Monday unless well.                                         Medical Decision Making  Problems Addressed:  Lower GI bleeding: complicated acute illness or injury    Amount and/or Complexity of Data Reviewed  Labs: ordered. Decision-making details  documented in ED Course.    Risk  Prescription drug management.        Final diagnoses:   Lower GI bleeding       ED Disposition  ED Disposition       ED Disposition   Discharge    Condition   Stable    Comment   --               Melo Sparks MD  [address] [id number]  [phone]    On 6/24/2024      [facility] EMERGENCY DEPARTMENT  [address] [place] [id number] [phone]    If symptoms worsen         Medication List      No changes were made to your prescriptions during this visit.            Yan Lynn MD  06/20/24 0134

## 2024-06-20 NOTE — TELEPHONE ENCOUNTER
I spoke to Joselyn's .  I explained to him  that Joselyn had called our office saying that she noticed some blood in her stools.  No questions were asked as far as the frequency the amount of blood excetra.  She currently is with some friends playing [employer].  I told him when she returned from her game that if her bowel frequency increased and all she was passing was blood it is because of the size of the polyp that was removed from her rectum and she needed to come to the emergency room.  If she saw small amounts of blood on her stool her bowel frequency had not changed this is acceptable because of the size of the polyp and the fact that no clips were available because of the size of the lesion that was removed from her rectum.  I am sure he understood and would relay this information.    Dr. Sparks

## 2024-06-20 NOTE — TELEPHONE ENCOUNTER
Patient called back and said she will go the emergency room in the morning because of blood in her stool.

## 2024-06-20 NOTE — TELEPHONE ENCOUNTER
Dr. Sparks,   This patient says she's been bleeding from her rectum every time she has a bowel movement for the last 2 days, it is bright red blood. She is concerned.

## 2024-06-21 ENCOUNTER — TELEPHONE (OUTPATIENT)
Dept: GASTROENTEROLOGY | Facility: CLINIC | Age: 84
End: 2024-06-21
Payer: MEDICARE

## 2024-06-21 NOTE — TELEPHONE ENCOUNTER
I called and left a message for Joselyn to make sure she understood what happened and to check on her .      Dr. Sparks

## 2024-06-24 ENCOUNTER — TELEPHONE (OUTPATIENT)
Dept: GASTROENTEROLOGY | Facility: CLINIC | Age: 84
End: 2024-06-24
Payer: MEDICARE

## 2024-06-24 NOTE — TELEPHONE ENCOUNTER
I called and left Joselyn a message . I thanked her for calling the office to inform us that she is no longer having any bleeding ./      Dr. Sparks

## 2024-09-04 DIAGNOSIS — I10 ESSENTIAL HYPERTENSION: Primary | Chronic | ICD-10-CM

## 2024-09-04 RX ORDER — METOPROLOL SUCCINATE 50 MG/1
50 TABLET, EXTENDED RELEASE ORAL DAILY
Qty: 90 TABLET | Refills: 0 | Status: SHIPPED | OUTPATIENT
Start: 2024-09-04

## 2024-09-23 ENCOUNTER — TRANSCRIBE ORDERS (OUTPATIENT)
Dept: ADMINISTRATIVE | Facility: HOSPITAL | Age: 84
End: 2024-09-23
Payer: MEDICARE

## 2024-09-23 DIAGNOSIS — Z12.31 OTHER SCREENING MAMMOGRAM: Primary | ICD-10-CM

## 2024-10-03 ENCOUNTER — OFFICE VISIT (OUTPATIENT)
Dept: INTERNAL MEDICINE | Facility: CLINIC | Age: 84
End: 2024-10-03
Payer: MEDICARE

## 2024-10-03 VITALS
HEART RATE: 60 BPM | SYSTOLIC BLOOD PRESSURE: 140 MMHG | HEIGHT: 64 IN | BODY MASS INDEX: 19.46 KG/M2 | WEIGHT: 114 LBS | DIASTOLIC BLOOD PRESSURE: 70 MMHG | OXYGEN SATURATION: 95 % | TEMPERATURE: 97.7 F

## 2024-10-03 DIAGNOSIS — E78.00 PURE HYPERCHOLESTEROLEMIA: Chronic | ICD-10-CM

## 2024-10-03 DIAGNOSIS — Z00.00 MEDICARE ANNUAL WELLNESS VISIT, SUBSEQUENT: Primary | ICD-10-CM

## 2024-10-03 DIAGNOSIS — I47.29 NSVT (NONSUSTAINED VENTRICULAR TACHYCARDIA): Chronic | ICD-10-CM

## 2024-10-03 DIAGNOSIS — Z78.0 POSTMENOPAUSAL: ICD-10-CM

## 2024-10-03 DIAGNOSIS — I10 ESSENTIAL HYPERTENSION: Chronic | ICD-10-CM

## 2024-10-03 DIAGNOSIS — Z23 NEED FOR VACCINATION: ICD-10-CM

## 2024-10-03 DIAGNOSIS — F51.01 PRIMARY INSOMNIA: Chronic | ICD-10-CM

## 2024-10-03 DIAGNOSIS — Z00.00 ROUTINE GENERAL MEDICAL EXAMINATION AT A HEALTH CARE FACILITY: ICD-10-CM

## 2024-10-03 PROCEDURE — 90662 IIV NO PRSV INCREASED AG IM: CPT | Performed by: INTERNAL MEDICINE

## 2024-10-03 PROCEDURE — G0439 PPPS, SUBSEQ VISIT: HCPCS | Performed by: INTERNAL MEDICINE

## 2024-10-03 PROCEDURE — 1160F RVW MEDS BY RX/DR IN RCRD: CPT | Performed by: INTERNAL MEDICINE

## 2024-10-03 PROCEDURE — 3077F SYST BP >= 140 MM HG: CPT | Performed by: INTERNAL MEDICINE

## 2024-10-03 PROCEDURE — 1126F AMNT PAIN NOTED NONE PRSNT: CPT | Performed by: INTERNAL MEDICINE

## 2024-10-03 PROCEDURE — 3078F DIAST BP <80 MM HG: CPT | Performed by: INTERNAL MEDICINE

## 2024-10-03 PROCEDURE — 1170F FXNL STATUS ASSESSED: CPT | Performed by: INTERNAL MEDICINE

## 2024-10-03 PROCEDURE — 1159F MED LIST DOCD IN RCRD: CPT | Performed by: INTERNAL MEDICINE

## 2024-10-03 PROCEDURE — 99397 PER PM REEVAL EST PAT 65+ YR: CPT | Performed by: INTERNAL MEDICINE

## 2024-10-03 PROCEDURE — G0008 ADMIN INFLUENZA VIRUS VAC: HCPCS | Performed by: INTERNAL MEDICINE

## 2024-10-03 RX ORDER — ESCITALOPRAM OXALATE 10 MG/1
10 TABLET ORAL DAILY
Qty: 90 TABLET | Refills: 3 | Status: SHIPPED | OUTPATIENT
Start: 2024-10-03

## 2024-10-03 RX ORDER — ZOLPIDEM TARTRATE 10 MG/1
10 TABLET ORAL NIGHTLY PRN
Qty: 30 TABLET | Refills: 2 | Status: SHIPPED | OUTPATIENT
Start: 2024-10-03

## 2024-10-03 NOTE — PROGRESS NOTES
Subjective   The ABCs of the Annual Wellness Visit  Medicare Wellness Visit      Joselyn Segura is a 83 y.o. patient who presents for a Medicare Wellness Visit.    The following portions of the patient's history were reviewed and   updated as appropriate: allergies, current medications, past family history, past medical history, past social history, past surgical history, and problem list.    Compared to one year ago, the patient's physical   health is the same.  Compared to one year ago, the patient's mental   health is worse.    Recent Hospitalizations:  She was not admitted to the hospital during the last year.     Current Medical Providers:  Patient Care Team:  Nohelia Barriga MD as PCP - General (Internal Medicine)  Melo Sparks MD as Consulting Physician (Gastroenterology)  Nohelia Rm MD as Consulting Physician (Dermatology)  Dr. Pearce (Cardiology)  Dr. Hughes (Dental General Practice)    Outpatient Medications Prior to Visit   Medication Sig Dispense Refill    aspirin 81 MG EC tablet Take 3 tablets by mouth Daily.      atorvastatin (LIPITOR) 10 MG tablet Take 1 tablet by mouth Daily. 90 tablet 1    Coenzyme Q10 (COQ10 PO) Take 1 capsule by mouth Daily.      escitalopram (Lexapro) 5 MG tablet Take 1 tablet by mouth Daily. 30 tablet 11    GLUCOSAMINE-CHONDROITIN PO Take 1 tablet by mouth 3 (Three) Times a Week.      hydrocortisone (ANUSOL-HC) 25 MG suppository Insert 1 suppository into the rectum 2 (Two) Times a Day As Needed for Hemorrhoids. 12 each 1    metoprolol succinate XL (TOPROL-XL) 50 MG 24 hr tablet TAKE 1 TABLET BY MOUTH DAILY 90 tablet 0    Omega-3 Fatty Acids (FISH OIL) 1000 MG capsule capsule Take  by mouth Daily With Breakfast.      Probiotic Product (PROBIOTIC DAILY PO) Take  by mouth Daily.      zolpidem (AMBIEN) 10 MG tablet Take 1 tablet by mouth At Night As Needed for Sleep. 30 tablet 2    metroNIDAZOLE (METROCREAM) 0.75 % cream 1 Application Daily As Needed.       No  "facility-administered medications prior to visit.     No opioid medication identified on active medication list. I have reviewed chart for other potential  high risk medication/s and harmful drug interactions in the elderly.      Aspirin is on active medication list. Aspirin use is indicated based on review of current medical condition/s. Pros and cons of this therapy have been discussed today. Benefits of this medication outweigh potential harm.  Patient has been encouraged to continue taking this medication.  .      Patient Active Problem List   Diagnosis    Primary insomnia    Osteopenia    Pure hypercholesterolemia    GERD (gastroesophageal reflux disease)    Intermittent palpitations    Essential hypertension    NSVT (nonsustained ventricular tachycardia)    Chronic constipation    Rectal bleed    Hemorrhoids     Advance Care Planning Advance Directive is on file.  ACP discussion was held with the patient during this visit. Patient has an advance directive in EMR which is still valid.             Objective   Vitals:    10/03/24 1307   BP: 140/70   BP Location: Right arm   Patient Position: Sitting   Cuff Size: Small Adult   Pulse: 60   Temp: 97.7 °F (36.5 °C)   SpO2: 95%   Weight: 51.7 kg (114 lb)   Height: 162.6 cm (64\")   PainSc: 0-No pain       Estimated body mass index is 19.57 kg/m² as calculated from the following:    Height as of this encounter: 162.6 cm (64\").    Weight as of this encounter: 51.7 kg (114 lb).    BMI is within normal parameters. No other follow-up for BMI required.       Does the patient have evidence of cognitive impairment? No forgets names at times, but overall                                                                                                Health  Risk Assessment    Smoking Status:  Social History     Tobacco Use   Smoking Status Former    Current packs/day: 0.00    Average packs/day: 0.3 packs/day for 16.0 years (4.8 ttl pk-yrs)    Types: Cigarettes    Start date: " 1958    Quit date: 1974    Years since quittin.3   Smokeless Tobacco Never     Alcohol Consumption:  Social History     Substance and Sexual Activity   Alcohol Use Yes    Alcohol/week: 1.0 standard drink of alcohol    Types: 1 Cans of beer per week    Comment: RARE       Fall Risk Screen  NADINEADI Fall Risk Assessment was completed, and patient is at LOW risk for falls.Assessment completed on:10/3/2024    Depression Screening:      10/3/2024     1:17 PM   PHQ-2/PHQ-9 Depression Screening   Little Interest or Pleasure in Doing Things 0-->not at all   Feeling Down, Depressed or Hopeless 0-->not at all   PHQ-9: Brief Depression Severity Measure Score 0     Health Habits and Functional and Cognitive Screening:      10/3/2024     1:08 PM   Functional & Cognitive Status   Do you have difficulty preparing food and eating? No   Do you have difficulty bathing yourself, getting dressed or grooming yourself? No   Do you have difficulty using the toilet? No   Do you have difficulty moving around from place to place? No   Do you have trouble with steps or getting out of a bed or a chair? No   Current Diet Well Balanced Diet   Dental Exam Up to date   Eye Exam Up to date   Exercise (times per week) 3 times per week   Current Exercises Include Walking;Tennis        Exercise Comment tennis, golf and walking   Do you need help using the phone?  No   Are you deaf or do you have serious difficulty hearing?  Yes   Do you need help to go to places out of walking distance? No   Do you need help shopping? No   Do you need help preparing meals?  No   Do you need help with housework?  No   Do you need help with laundry? No   Do you need help taking your medications? No   Do you need help managing money? No   Do you ever drive or ride in a car without wearing a seat belt? No   Have you felt unusual stress, anger or loneliness in the last month? No   Who do you live with? Spouse   If you need help, do you have trouble finding  someone available to you? No   Have you been bothered in the last four weeks by sexual problems? No   Do you have difficulty concentrating, remembering or making decisions? No           Age-appropriate Screening Schedule:  Refer to the list below for future screening recommendations based on patient's age, sex and/or medical conditions. Orders for these recommended tests are listed in the plan section. The patient has been provided with a written plan.    Health Maintenance List  Health Maintenance   Topic Date Due    RSV Vaccine - Adults (1 - 1-dose 60+ series) Never done    ZOSTER VACCINE (2 of 3) 07/31/2009    TDAP/TD VACCINES (2 - Td or Tdap) 06/02/2018    DXA SCAN  12/03/2023    ANNUAL WELLNESS VISIT  05/10/2024    INFLUENZA VACCINE  08/01/2024    COVID-19 Vaccine (5 - 2023-24 season) 09/01/2024    LIPID PANEL  03/14/2025    MAMMOGRAM  08/09/2025    Pneumococcal Vaccine 65+  Completed                                                                                                                                                CMS Preventative Services Quick Reference  Risk Factors Identified During Encounter  None Identified    The above risks/problems have been discussed with the patient.  Pertinent information has been shared with the patient in the After Visit Summary.  An After Visit Summary and PPPS were made available to the patient.    Follow Up:   Next Medicare Wellness visit to be scheduled in 1 year.         Additional E&M Note during same encounter follows:  Patient has additional, significant, and separately identifiable condition(s)/problem(s) that require work above and beyond the Medicare Wellness Visit     Chief Complaint  Medicare Wellness-subsequent    Subjective   HPI      Review of Systems   Constitutional:  Negative for activity change, appetite change, fatigue and fever.   Respiratory:  Negative for shortness of breath.    Cardiovascular:  Negative for chest pain and leg swelling.  "  Gastrointestinal:  Negative for blood in stool (resolved).   Musculoskeletal:  Positive for arthralgias.   Allergic/Immunologic: Negative for immunocompromised state.   Neurological:  Negative for seizures, syncope, speech difficulty, weakness and confusion.   Psychiatric/Behavioral:  Positive for sleep disturbance (ambien working well, 1/2-1 each night). Negative for decreased concentration and dysphoric mood. The patient is nervous/anxious (lots of stress).         Insomnia - she is taking 1/2-1 ambien every night, and this has been working well.  No side effects from the Ambien as far as grogginess the next day her cognitive impairment     HTN - she is taking metoprolol 25 1 q am every day, and the losartan 25 generally just in the winter months since blood pressures tend to be much lower during the summer months.       Chronic constipation-patient doing well with daily magnesium and is having 1-2 bowel movements a day.  Did have colonoscopy earlier this year and had some persistent bleeding afterwards and had to go to the ER but CBC was fine and has not had any bleeding since then     Hyperlipidemia-she is taking Lipitor regularly - 4 a week.  Also takes aspirin 4 times a week     Stress-her brother has been diagnosed with dementia and patient also helps to care for her sister as well as her sister-in-law.  Her  also is having some memory difficulties so patient feels a lot of stress on her.  She does feel like she gets irritable and currently is on Lexapro 5       Exercise - regularly, golf, tennis, pickle ball, walking  Diet - healthy; she is on Mg daily; D during the winter, co q 10. Off fish oil and probiotic and glucosamine      Objective   Vital Signs:  /70 (BP Location: Right arm, Patient Position: Sitting, Cuff Size: Small Adult)   Pulse 60   Temp 97.7 °F (36.5 °C)   Ht 162.6 cm (64\")   Wt 51.7 kg (114 lb)   SpO2 95%   BMI 19.57 kg/m²   Physical Exam            Assessment and Plan "               Medicare annual wellness visit, subsequent  Regular exercise/healthy diet. BSE q month. Sunscreen use encouraged.   Living will - pt has and is on file  [facility] is [facility] 10/[facility]  Colon due 6/25-1 year follow-up with Dr. Sparks because of polyps. She is not sure what she wants to do  Pneumovax - had last year  Flu vaccine today  COVID vaccine-patient declines  prevnar 13- had  RSV - discussed  Had hep A already  DEXA due now (osteopenia) we will schedule  Shingles - shingrix discussed -  can check at pharmacy   Tdap-patient will get at the pharmacy  Does not need paps since age 65 or older and has had normal paps in past     Routine general medical examination at a health care facility    Pure hypercholesterolemia  Recheck FLP next year     Essential hypertension  BP generally has been well-controlled    NSVT (nonsustained ventricular tachycardia)  Patient sees cardiology regularly  Primary insomnia  Continue Ambien.  She has signed controlled substance contract.  Evans appropriate  Need for vaccination    Postmenopausal    No orders of the defined types were placed in this encounter.  Anxiety-we will raise Lexapro to 10 mg          Follow Up   No follow-ups on file.  Patient was given instructions and counseling regarding her condition or for health maintenance advice. Please see specific information pulled into the AVS if appropriate.

## 2024-10-25 LAB — NCCN CRITERIA FLAG: NORMAL

## 2024-11-01 ENCOUNTER — HOSPITAL ENCOUNTER (OUTPATIENT)
Dept: MAMMOGRAPHY | Facility: HOSPITAL | Age: 84
Discharge: HOME | End: 2024-11-01
Admitting: INTERNAL MEDICINE
Payer: MEDICARE

## 2024-11-01 DIAGNOSIS — Z12.31 OTHER SCREENING MAMMOGRAM: ICD-10-CM

## 2024-11-01 PROCEDURE — 77067 SCR MAMMO BI INCL CAD: CPT

## 2024-11-01 PROCEDURE — 77063 BREAST TOMOSYNTHESIS BI: CPT

## 2024-12-14 DIAGNOSIS — I10 ESSENTIAL HYPERTENSION: Chronic | ICD-10-CM

## 2024-12-16 RX ORDER — METOPROLOL SUCCINATE 50 MG/1
50 TABLET, EXTENDED RELEASE ORAL DAILY
Qty: 90 TABLET | Refills: 3 | Status: SHIPPED | OUTPATIENT
Start: 2024-12-16

## 2024-12-16 NOTE — TELEPHONE ENCOUNTER
Rx Refill Note  Requested Prescriptions     Pending Prescriptions Disp Refills    metoprolol succinate XL (TOPROL-XL) 50 MG 24 hr tablet [Pharmacy Med Name: METOPROLOL SUCC ER 50 MG TAB] 90 tablet 0     Sig: TAKE 1 TABLET BY MOUTH DAILY      Last office visit with prescribing clinician: 10/3/2024     Next office visit with prescribing clinician: 1/9/2025       Josselyn Lemus  12/16/24, 07:52 EST

## 2025-01-09 ENCOUNTER — OFFICE VISIT (OUTPATIENT)
Dept: INTERNAL MEDICINE | Facility: CLINIC | Age: 85
End: 2025-01-09
Payer: MEDICARE

## 2025-01-09 ENCOUNTER — LAB (OUTPATIENT)
Dept: INTERNAL MEDICINE | Facility: CLINIC | Age: 85
End: 2025-01-09
Payer: MEDICARE

## 2025-01-09 VITALS
SYSTOLIC BLOOD PRESSURE: 134 MMHG | OXYGEN SATURATION: 96 % | HEART RATE: 54 BPM | TEMPERATURE: 97.5 F | WEIGHT: 117.8 LBS | HEIGHT: 64 IN | BODY MASS INDEX: 20.11 KG/M2 | DIASTOLIC BLOOD PRESSURE: 68 MMHG | RESPIRATION RATE: 14 BRPM

## 2025-01-09 DIAGNOSIS — M25.552 LEFT HIP PAIN: ICD-10-CM

## 2025-01-09 DIAGNOSIS — E78.00 PURE HYPERCHOLESTEROLEMIA: Chronic | ICD-10-CM

## 2025-01-09 DIAGNOSIS — Z23 NEED FOR COVID-19 VACCINE: ICD-10-CM

## 2025-01-09 DIAGNOSIS — E55.9 VITAMIN D DEFICIENCY: ICD-10-CM

## 2025-01-09 DIAGNOSIS — I10 ESSENTIAL HYPERTENSION: Chronic | ICD-10-CM

## 2025-01-09 DIAGNOSIS — F51.01 PRIMARY INSOMNIA: Chronic | ICD-10-CM

## 2025-01-09 DIAGNOSIS — I10 ESSENTIAL HYPERTENSION: Primary | Chronic | ICD-10-CM

## 2025-01-09 DIAGNOSIS — I47.29 NSVT (NONSUSTAINED VENTRICULAR TACHYCARDIA): Chronic | ICD-10-CM

## 2025-01-09 LAB
ALBUMIN SERPL-MCNC: 4.7 G/DL (ref 3.5–5.2)
ALBUMIN/GLOB SERPL: 1.7 G/DL
ALP SERPL-CCNC: 85 U/L (ref 39–117)
ALT SERPL W P-5'-P-CCNC: 21 U/L (ref 1–33)
ANION GAP SERPL CALCULATED.3IONS-SCNC: 10 MMOL/L (ref 5–15)
AST SERPL-CCNC: 28 U/L (ref 1–32)
BILIRUB SERPL-MCNC: 0.8 MG/DL (ref 0–1.2)
BUN SERPL-MCNC: 16 MG/DL (ref 8–23)
BUN/CREAT SERPL: 13.6 (ref 7–25)
CALCIUM SPEC-SCNC: 9.8 MG/DL (ref 8.6–10.5)
CHLORIDE SERPL-SCNC: 99 MMOL/L (ref 98–107)
CHOLEST SERPL-MCNC: 203 MG/DL (ref 0–200)
CO2 SERPL-SCNC: 28 MMOL/L (ref 22–29)
CREAT SERPL-MCNC: 1.18 MG/DL (ref 0.57–1)
DEPRECATED RDW RBC AUTO: 38.7 FL (ref 37–54)
EGFRCR SERPLBLD CKD-EPI 2021: 45.4 ML/MIN/1.73
ERYTHROCYTE [DISTWIDTH] IN BLOOD BY AUTOMATED COUNT: 12 % (ref 12.3–15.4)
GLOBULIN UR ELPH-MCNC: 2.8 GM/DL
GLUCOSE SERPL-MCNC: 93 MG/DL (ref 65–99)
HCT VFR BLD AUTO: 42.1 % (ref 34–46.6)
HDLC SERPL-MCNC: 71 MG/DL (ref 40–60)
HGB BLD-MCNC: 14.3 G/DL (ref 12–15.9)
LDLC SERPL CALC-MCNC: 110 MG/DL (ref 0–100)
LDLC/HDLC SERPL: 1.5 {RATIO}
MCH RBC QN AUTO: 29.8 PG (ref 26.6–33)
MCHC RBC AUTO-ENTMCNC: 34 G/DL (ref 31.5–35.7)
MCV RBC AUTO: 87.7 FL (ref 79–97)
PLATELET # BLD AUTO: 245 10*3/MM3 (ref 140–450)
PMV BLD AUTO: 10.8 FL (ref 6–12)
POTASSIUM SERPL-SCNC: 4.4 MMOL/L (ref 3.5–5.2)
PROT SERPL-MCNC: 7.5 G/DL (ref 6–8.5)
RBC # BLD AUTO: 4.8 10*6/MM3 (ref 3.77–5.28)
SODIUM SERPL-SCNC: 137 MMOL/L (ref 136–145)
TRIGL SERPL-MCNC: 126 MG/DL (ref 0–150)
VLDLC SERPL-MCNC: 22 MG/DL (ref 5–40)
WBC NRBC COR # BLD AUTO: 4.95 10*3/MM3 (ref 3.4–10.8)

## 2025-01-09 PROCEDURE — 91320 SARSCV2 VAC 30MCG TRS-SUC IM: CPT | Performed by: INTERNAL MEDICINE

## 2025-01-09 PROCEDURE — 1159F MED LIST DOCD IN RCRD: CPT | Performed by: INTERNAL MEDICINE

## 2025-01-09 PROCEDURE — 1160F RVW MEDS BY RX/DR IN RCRD: CPT | Performed by: INTERNAL MEDICINE

## 2025-01-09 PROCEDURE — 84443 ASSAY THYROID STIM HORMONE: CPT | Performed by: INTERNAL MEDICINE

## 2025-01-09 PROCEDURE — 3078F DIAST BP <80 MM HG: CPT | Performed by: INTERNAL MEDICINE

## 2025-01-09 PROCEDURE — 1125F AMNT PAIN NOTED PAIN PRSNT: CPT | Performed by: INTERNAL MEDICINE

## 2025-01-09 PROCEDURE — 80053 COMPREHEN METABOLIC PANEL: CPT | Performed by: INTERNAL MEDICINE

## 2025-01-09 PROCEDURE — 90480 ADMN SARSCOV2 VAC 1/ONLY CMP: CPT | Performed by: INTERNAL MEDICINE

## 2025-01-09 PROCEDURE — 36415 COLL VENOUS BLD VENIPUNCTURE: CPT | Performed by: INTERNAL MEDICINE

## 2025-01-09 PROCEDURE — 80061 LIPID PANEL: CPT | Performed by: INTERNAL MEDICINE

## 2025-01-09 PROCEDURE — 82306 VITAMIN D 25 HYDROXY: CPT | Performed by: INTERNAL MEDICINE

## 2025-01-09 PROCEDURE — 3075F SYST BP GE 130 - 139MM HG: CPT | Performed by: INTERNAL MEDICINE

## 2025-01-09 PROCEDURE — 85027 COMPLETE CBC AUTOMATED: CPT | Performed by: INTERNAL MEDICINE

## 2025-01-09 PROCEDURE — 99214 OFFICE O/P EST MOD 30 MIN: CPT | Performed by: INTERNAL MEDICINE

## 2025-01-09 RX ORDER — ESCITALOPRAM OXALATE 5 MG/1
5 TABLET ORAL DAILY
Qty: 90 TABLET | Refills: 3 | Status: SHIPPED | OUTPATIENT
Start: 2025-01-09

## 2025-01-09 RX ORDER — ZOLPIDEM TARTRATE 10 MG/1
10 TABLET ORAL NIGHTLY PRN
Qty: 30 TABLET | Refills: 2 | Status: SHIPPED | OUTPATIENT
Start: 2025-01-09

## 2025-01-09 NOTE — PROGRESS NOTES
Chief Complaint  Hypertension and Insomnia (3 month med follow up)    Subjective          Joselyn Segura presents to [facility]    History of Present Illness  The patient presents for follow-up on insomnia, hyperlipidemia, hypertension, anxiety, left hip pain, and health maintenance.    Anxiety - patient did go up to Lexapro 10 after last visit, which did help with her anxiety but she noticed more fatigue recently so has been doing just the half tablet again feels like she is managing fairly well.   She has requested a prescription for the 5 mg dose as she finds it challenging to split the 10 mg tablets accurately.    Insomnia-patient on Ambien 10, usually half tablet is enough but we do have prescription written for 1/2-1 in case she needs the full tablet.  He has been sleeping well with the Ambien and has had no side effects from Ambien and has been on for many years    Hypertension-patient's typical pattern is that she holds the losartan during the summer as her blood pressures tend to be lower during the warm weather but she has noticed that blood pressures have stayed low even through the winter months so she has not restarted losartan but is only taking the metoprolol.  She does monitor blood pressure regularly.  Does occasionally have an episode of the fast heart rate and has history of SVT.  She does have follow-up with cardiology, Dr. Kay in 03/2025.    Chronic constipation-bowel movements have been fairly regular using magnesium.  She may switch to magnesium citrate to see how she does with that.    Left hip pain-  She reports experiencing pain in her left hip when lifting her leg, which has been present for the past few weeks. She does not wish to pursue any radiographic imaging or physical therapy at this time. S She first noticed the hip pain while attempting to put on her shoe.    Hyperlipidemia-patient is on Lipitor.  She had spiced tea this morning but otherwise has  "not eaten or drank anything else         Current Outpatient Medications:     aspirin 81 MG EC tablet, Take 3 tablets by mouth Daily., Disp: , Rfl:     atorvastatin (LIPITOR) 10 MG tablet, Take 1 tablet by mouth Daily., Disp: 90 tablet, Rfl: 1    Coenzyme Q10 (COQ10 PO), Take 1 capsule by mouth Daily., Disp: , Rfl:     escitalopram (Lexapro) 10 MG tablet, Take 1 tablet by mouth Daily., Disp: 90 tablet, Rfl: 3    GLUCOSAMINE-CHONDROITIN PO, Take 1 tablet by mouth 3 (Three) Times a Week., Disp: , Rfl:     metoprolol succinate XL (TOPROL-XL) 50 MG 24 hr tablet, TAKE 1 TABLET BY MOUTH DAILY, Disp: 90 tablet, Rfl: 3    Omega-3 Fatty Acids (FISH OIL) 1000 MG capsule capsule, Take  by mouth Daily With Breakfast., Disp: , Rfl:     Probiotic Product (PROBIOTIC DAILY PO), Take  by mouth Daily., Disp: , Rfl:     zolpidem (AMBIEN) 10 MG tablet, Take 1 tablet by mouth At Night As Needed for Sleep., Disp: 30 tablet, Rfl: 2   The following portions of the patient's history were reviewed and updated as appropriate: allergies, current medications, past family history, past medical history, past social history, past surgical history and problem list.     Objective   Vital Signs:   /68 (BP Location: Left arm, Patient Position: Sitting, Cuff Size: Adult)   Pulse 54   Temp 97.5 °F (36.4 °C) (Infrared)   Resp 14   Ht 162.6 cm (64\")   Wt 53.4 kg (117 lb 12.8 oz)   SpO2 96%   BMI 20.22 kg/m²       Physical exam  Constitutional: oriented to person, place, and time.  well-developed and well-nourished. No distress.   HENT:   Head: Normocephalic and atraumatic.   Eyes: Conjunctivae and EOM are normal.   Cardiovascular: Normal rate, regular rhythm and normal heart sounds.  Exam reveals no gallop and no friction rub.    No murmur heard.  Pulmonary/Chest: Effort normal and breath sounds normal. No respiratory distress.   no wheezes.   Neurological:  alert and oriented to person, place, and time.   Abd: soft, NTND  Skin: Skin is warm " and dry. not diaphoretic.   MS: does have pain with hip internal and external rotation on the left.  Psychiatric:  normal mood and affect. behavior is normal. Judgment and thought content normal.      Physical Exam     Results         Result Review :   The following data was reviewed by: Nohelia Barriga MD on 01/09/2025:  Common labs          3/14/2024    11:34 6/20/2024    18:07   Common Labs   Glucose 81  100    BUN 14  17    Creatinine 0.98  0.93    Sodium 141  140    Potassium 4.4  4.4    Chloride 102  103    Calcium 10.1  10.1    Albumin 4.7  4.4    Total Bilirubin 0.7  0.3    Alkaline Phosphatase 92  83    AST (SGOT) 28  25    ALT (SGPT) 24  17    WBC 5.39  5.91    Hemoglobin 14.0  13.3    Hematocrit 43.2  40.8    Platelets 242  228    Total Cholesterol 181     Triglycerides 101     HDL Cholesterol 69     LDL Cholesterol  94                  TSH   Date Value Ref Range Status   05/17/2023 1.970 0.270 - 4.200 uIU/mL Final          Assessment and Plan          Assessment & Plan  1. Anxiety.  Will continue Lexapro 5 mg.    2. Insomnia.  Stable on long-term Ambien.  She has signed controlled substance contract.  Evans appropriate    3. Hypertension.  Continue metoprolol.  She has follow-up with cardiology and will discuss the tachycardia with them    4. Hyperlipidemia.  Check labs today      5. Left hip pain.  Probably osteoarthritis.  We discussed getting an x-ray or referring to Ortho but patient managing okay and will let me know if she wants to do anything further for this    6. Health maintenance.  She will receive the COVID-19 vaccine today.    7. Constipation.  Would be fine to try a different magnesium supplement         Follow Up   No follow-ups on file.  Patient was given instructions and counseling regarding her condition or for health maintenance advice. Please see specific information pulled into the AVS if appropriate.     Patient or patient representative verbalized consent for the use of Ambient  Listening during the visit with  Nohelia Barriga MD for chart documentation. 1/9/2025  11:44 EST

## 2025-01-10 LAB
25(OH)D3 SERPL-MCNC: 64.4 NG/ML (ref 30–100)
TSH SERPL DL<=0.05 MIU/L-ACNC: 1.7 UIU/ML (ref 0.27–4.2)

## 2025-02-17 ENCOUNTER — LAB (OUTPATIENT)
Dept: INTERNAL MEDICINE | Facility: CLINIC | Age: 85
End: 2025-02-17
Payer: MEDICARE

## 2025-02-17 ENCOUNTER — OFFICE VISIT (OUTPATIENT)
Dept: INTERNAL MEDICINE | Facility: CLINIC | Age: 85
End: 2025-02-17
Payer: MEDICARE

## 2025-02-17 ENCOUNTER — HOSPITAL ENCOUNTER (OUTPATIENT)
Dept: GENERAL RADIOLOGY | Facility: HOSPITAL | Age: 85
Discharge: HOME | End: 2025-02-17
Payer: MEDICARE

## 2025-02-17 VITALS
HEART RATE: 76 BPM | SYSTOLIC BLOOD PRESSURE: 124 MMHG | TEMPERATURE: 98.1 F | BODY MASS INDEX: 18.47 KG/M2 | OXYGEN SATURATION: 95 % | HEIGHT: 64 IN | WEIGHT: 108.2 LBS | DIASTOLIC BLOOD PRESSURE: 76 MMHG | RESPIRATION RATE: 18 BRPM

## 2025-02-17 DIAGNOSIS — R05.1 ACUTE COUGH: ICD-10-CM

## 2025-02-17 DIAGNOSIS — J06.9 VIRAL UPPER RESPIRATORY TRACT INFECTION: ICD-10-CM

## 2025-02-17 DIAGNOSIS — R53.83 OTHER FATIGUE: ICD-10-CM

## 2025-02-17 DIAGNOSIS — R05.1 ACUTE COUGH: Primary | ICD-10-CM

## 2025-02-17 DIAGNOSIS — I95.9 HYPOTENSION, UNSPECIFIED HYPOTENSION TYPE: ICD-10-CM

## 2025-02-17 LAB
ALBUMIN SERPL-MCNC: 3.8 G/DL (ref 3.5–5.2)
ALBUMIN/GLOB SERPL: 1.2 G/DL
ALP SERPL-CCNC: 82 U/L (ref 39–117)
ALT SERPL W P-5'-P-CCNC: 21 U/L (ref 1–33)
ANION GAP SERPL CALCULATED.3IONS-SCNC: 12.7 MMOL/L (ref 5–15)
AST SERPL-CCNC: 24 U/L (ref 1–32)
BASOPHILS # BLD AUTO: 0.06 10*3/MM3 (ref 0–0.2)
BASOPHILS NFR BLD AUTO: 0.6 % (ref 0–1.5)
BILIRUB SERPL-MCNC: 0.7 MG/DL (ref 0–1.2)
BUN SERPL-MCNC: 11 MG/DL (ref 8–23)
BUN/CREAT SERPL: 11.3 (ref 7–25)
CALCIUM SPEC-SCNC: 9.9 MG/DL (ref 8.6–10.5)
CHLORIDE SERPL-SCNC: 99 MMOL/L (ref 98–107)
CO2 SERPL-SCNC: 25.3 MMOL/L (ref 22–29)
CREAT SERPL-MCNC: 0.97 MG/DL (ref 0.57–1)
DEPRECATED RDW RBC AUTO: 38.6 FL (ref 37–54)
EGFRCR SERPLBLD CKD-EPI 2021: 57.4 ML/MIN/1.73
EOSINOPHIL # BLD AUTO: 0.09 10*3/MM3 (ref 0–0.4)
EOSINOPHIL NFR BLD AUTO: 0.9 % (ref 0.3–6.2)
ERYTHROCYTE [DISTWIDTH] IN BLOOD BY AUTOMATED COUNT: 12.3 % (ref 12.3–15.4)
GLOBULIN UR ELPH-MCNC: 3.2 GM/DL
GLUCOSE SERPL-MCNC: 89 MG/DL (ref 65–99)
HCT VFR BLD AUTO: 39.5 % (ref 34–46.6)
HGB BLD-MCNC: 13.4 G/DL (ref 12–15.9)
IMM GRANULOCYTES # BLD AUTO: 0.11 10*3/MM3 (ref 0–0.05)
IMM GRANULOCYTES NFR BLD AUTO: 1.1 % (ref 0–0.5)
LYMPHOCYTES # BLD AUTO: 1.35 10*3/MM3 (ref 0.7–3.1)
LYMPHOCYTES NFR BLD AUTO: 13.4 % (ref 19.6–45.3)
MCH RBC QN AUTO: 29.5 PG (ref 26.6–33)
MCHC RBC AUTO-ENTMCNC: 33.9 G/DL (ref 31.5–35.7)
MCV RBC AUTO: 87 FL (ref 79–97)
MONOCYTES # BLD AUTO: 1 10*3/MM3 (ref 0.1–0.9)
MONOCYTES NFR BLD AUTO: 9.9 % (ref 5–12)
NEUTROPHILS NFR BLD AUTO: 7.47 10*3/MM3 (ref 1.7–7)
NEUTROPHILS NFR BLD AUTO: 74.1 % (ref 42.7–76)
NRBC BLD AUTO-RTO: 0 /100 WBC (ref 0–0.2)
PLATELET # BLD AUTO: 536 10*3/MM3 (ref 140–450)
PMV BLD AUTO: 9.1 FL (ref 6–12)
POTASSIUM SERPL-SCNC: 4.1 MMOL/L (ref 3.5–5.2)
PROT SERPL-MCNC: 7 G/DL (ref 6–8.5)
RBC # BLD AUTO: 4.54 10*6/MM3 (ref 3.77–5.28)
SODIUM SERPL-SCNC: 137 MMOL/L (ref 136–145)
WBC NRBC COR # BLD AUTO: 10.08 10*3/MM3 (ref 3.4–10.8)

## 2025-02-17 PROCEDURE — 3078F DIAST BP <80 MM HG: CPT

## 2025-02-17 PROCEDURE — G2211 COMPLEX E/M VISIT ADD ON: HCPCS

## 2025-02-17 PROCEDURE — 1159F MED LIST DOCD IN RCRD: CPT

## 2025-02-17 PROCEDURE — 71046 X-RAY EXAM CHEST 2 VIEWS: CPT

## 2025-02-17 PROCEDURE — 1125F AMNT PAIN NOTED PAIN PRSNT: CPT

## 2025-02-17 PROCEDURE — 80053 COMPREHEN METABOLIC PANEL: CPT

## 2025-02-17 PROCEDURE — 3074F SYST BP LT 130 MM HG: CPT

## 2025-02-17 PROCEDURE — 99214 OFFICE O/P EST MOD 30 MIN: CPT

## 2025-02-17 PROCEDURE — 1160F RVW MEDS BY RX/DR IN RCRD: CPT

## 2025-02-17 PROCEDURE — 85025 COMPLETE CBC W/AUTO DIFF WBC: CPT

## 2025-02-17 PROCEDURE — 87040 BLOOD CULTURE FOR BACTERIA: CPT

## 2025-02-17 PROCEDURE — 36415 COLL VENOUS BLD VENIPUNCTURE: CPT

## 2025-02-17 RX ORDER — BROMPHENIRAMINE MALEATE, PSEUDOEPHEDRINE HYDROCHLORIDE, AND DEXTROMETHORPHAN HYDROBROMIDE 2; 30; 10 MG/5ML; MG/5ML; MG/5ML
5 SYRUP ORAL 4 TIMES DAILY PRN
Qty: 120 ML | Refills: 0 | Status: SHIPPED | OUTPATIENT
Start: 2025-02-17 | End: 2025-02-23

## 2025-02-17 RX ORDER — AZITHROMYCIN 250 MG/1
TABLET, FILM COATED ORAL
Qty: 6 TABLET | Refills: 0 | Status: SHIPPED | OUTPATIENT
Start: 2025-02-17

## 2025-02-17 NOTE — PROGRESS NOTES
Office Note     Name: Joselyn Segura    : 1940     MRN: [id number]     Chief Complaint  Fever (Flu A /Was never tested but  and grandson tested positive /Fever has broke /Went to  last  in [place] and they said she had a rattling sound in her lungs )    Subjective     History of Present Illness:  History of Present Illness      Joselyn Segura is a 84 y.o. female who presents today for continued cough/URI symptoms.    3 weeks ago her grandson tested positive for the flu. She was in close contact with him and states 2 days later she began feeling bad. She developed a terrible cough and body aches. She also developed fever, Tmax 102.5 and had a fever almost daily for about a week, last known fever was last  (7 days ago). Went to urgent care 1 week ago, tested negative for flu and covid but was prescribed 1 week of doxycycline and steroids to treat for possible pneumonia which she has finished.     She did notice some improvements on the antibiotics but states her symptoms have not fully resolved. She does not have much of an appetite and she continues to have wet cough, & no energy. She has been monitoring her BP and HR and states BP has been as low as 80s/50s and her -110.     Past Medical History:   Diagnosis Date    Acid reflux     Carotid bruit 2000    left, US normal, repat duplex  - mild B 0-29% stenosis    Cystadenoma     Endometriosis     with subsequent surgery    Esophagitis 2015    EGD - gastritis, HH    Gastritis     H/O bone density study 2016,  - worsening osteopenia;  stahble osteopenia, recheck in 2 years     H/O colonoscopy 2012 normal; : normal ([facility]) repeat 10 years    H/O mammogram 2018    [facility]    Hyperlipidemia     Hypertension     Insomnia     Optic neuritis 2000    Palpitations 2012    normal stress echo EKG T inv V2V3    Pap smear for cervical cancer screening 2012    BMG 6/28/10 normal     Pelvic mass     SVT (supraventricular tachycardia) 2017    on holter     Past Surgical History:   Procedure Laterality Date    BASAL CELL CARCINOMA EXCISION  12/2014    [facility] surgery, Dr. Rm, Dr. Richard at     COLONOSCOPY W/ BIOPSIES AND POLYPECTOMY  08/06/2020    Dr. Sparks    ENDOSCOPY  05/01/2015    KNEE ACL RECONSTRUCTION Left 1985    injury with subsequent surgery    KNEE ARTHROSCOPY Right 05/10/2018    Dr. Sachin Merino    TOTAL ABDOMINAL HYSTERECTOMY WITH SALPINGO OOPHORECTOMY  10/2010    large pelvic mass - s/p excision, Dr. Goodman - cystadenoma L ovary, leiomyoma (benign)       Current Outpatient Medications:     aspirin 81 MG EC tablet, Take 3 tablets by mouth Daily., Disp: , Rfl:     atorvastatin (LIPITOR) 10 MG tablet, Take 1 tablet by mouth Daily., Disp: 90 tablet, Rfl: 1    Coenzyme Q10 (COQ10 PO), Take 1 capsule by mouth Daily., Disp: , Rfl:     escitalopram (Lexapro) 5 MG tablet, Take 1 tablet by mouth Daily., Disp: 90 tablet, Rfl: 3    GLUCOSAMINE-CHONDROITIN PO, Take 1 tablet by mouth 3 (Three) Times a Week., Disp: , Rfl:     metoprolol succinate XL (TOPROL-XL) 50 MG 24 hr tablet, TAKE 1 TABLET BY MOUTH DAILY, Disp: 90 tablet, Rfl: 3    Omega-3 Fatty Acids (FISH OIL) 1000 MG capsule capsule, Take  by mouth Daily With Breakfast., Disp: , Rfl:     Probiotic Product (PROBIOTIC DAILY PO), Take  by mouth Daily., Disp: , Rfl:     zolpidem (AMBIEN) 10 MG tablet, Take 1 tablet by mouth At Night As Needed for Sleep., Disp: 30 tablet, Rfl: 2    azithromycin (ZITHROMAX) 250 MG tablet, Take 2 tablets the first day, then 1 tablet daily for 4 days., Disp: 6 tablet, Rfl: 0    brompheniramine-pseudoephedrine-DM 30-2-10 MG/5ML syrup, Take 5 mL by mouth 4 (Four) Times a Day As Needed for Congestion or Cough for up to 6 days., Disp: 120 mL, Rfl: 0  Allergies   Allergen Reactions    Ace Inhibitors Cough    Lisinopril Cough    Sulfadiazine Other (See Comments)     Mother told her she was allergic as a child.     "Trazodone Irritability    Premarin [Conjugated Estrogens] Other (See Comments)     Not sure    Sulfa Antibiotics Rash       Objective     Vital Signs  /76 (BP Location: Left arm, Patient Position: Sitting, Cuff Size: Adult) Comment: BP machine 122/81  Pulse 76   Temp 98.1 °F (36.7 °C) (Oral)   Resp 18   Ht 162.6 cm (64.02\")   Wt 49.1 kg (108 lb 3.2 oz)   SpO2 95%   BMI 18.56 kg/m²   Estimated body mass index is 18.56 kg/m² as calculated from the following:    Height as of this encounter: 162.6 cm (64.02\").    Weight as of this encounter: 49.1 kg (108 lb 3.2 oz).    BMI is within normal parameters. No other follow-up for BMI required.      Physical Exam    Physical Exam  Constitutional:       Appearance: She is ill-appearing.   Eyes:      Extraocular Movements: Extraocular movements intact.      Conjunctiva/sclera: Conjunctivae normal.      Pupils: Pupils are equal, round, and reactive to light.   Cardiovascular:      Rate and Rhythm: Normal rate and regular rhythm.      Pulses: Normal pulses.   Pulmonary:      Effort: No tachypnea.      Breath sounds: Examination of the left-lower field reveals rhonchi. Rhonchi (mild) present. No decreased breath sounds or wheezing.   Neurological:      General: No focal deficit present.      Mental Status: She is alert and oriented to person, place, and time. Mental status is at baseline.   Psychiatric:         Mood and Affect: Mood normal.         Behavior: Behavior normal.         Thought Content: Thought content normal.         Judgment: Judgment normal.          Results           Assessment and Plan     Diagnoses and all orders for this visit:    1. Acute cough (Primary)  -     Comprehensive Metabolic Panel; Future  -     CBC w AUTO Differential; Future  -     Blood Culture - Blood,; Future  -     Blood Culture - Blood,; Future  -     Cancel: XR Chest PA & Lateral; Future  -     brompheniramine-pseudoephedrine-DM 30-2-10 MG/5ML syrup; Take 5 mL by mouth 4 (Four) " Times a Day As Needed for Congestion or Cough for up to 6 days.  Dispense: 120 mL; Refill: 0  -     XR Chest PA & Lateral; Future    2. Hypotension, unspecified hypotension type  -     Comprehensive Metabolic Panel; Future  -     CBC w AUTO Differential; Future  -     Blood Culture - Blood,; Future  -     Blood Culture - Blood,; Future    3. Other fatigue  -     Comprehensive Metabolic Panel; Future  -     CBC w AUTO Differential; Future  -     Blood Culture - Blood,; Future  -     Blood Culture - Blood,; Future    Will check labs  STAT CXR ordered to check for infection  Will send cough medication to pharmacy  Add mucinex bid  Encouraged adequate hydration  Tylenol for fevers/body aches  RTC if symptoms worsen or do not improve  Assessment & Plan      If labs or images are ordered we will contact you with the results within the next week.  If you have not heard from us after a week please call our office to inquire about the results.    Follow Up  Return if symptoms worsen or fail to improve.    Rhianna Matamoros, APRN

## 2025-02-22 LAB
BACTERIA SPEC AEROBE CULT: NORMAL
BACTERIA SPEC AEROBE CULT: NORMAL

## 2025-02-28 NOTE — TELEPHONE ENCOUNTER
Last Office Visit: 01/04/2021  Next Office Visit: 04/08/2021    Labs completed in past 6 months? yes  Labs completed in past year? yes    Last Refill Date: 09/22/2020  Quantity: 90  Refills: 1    Pharm[facility]:  [facility] [address] [place] [address] [facility] [phone]  - [phone]   well developed, well nourished , in no acute distress , ambulating without difficulty , normal communication ability

## 2025-05-06 ENCOUNTER — TELEPHONE (OUTPATIENT)
Dept: INTERNAL MEDICINE | Facility: CLINIC | Age: 85
End: 2025-05-06
Payer: MEDICARE

## 2025-05-06 NOTE — TELEPHONE ENCOUNTER
Left message on voicemail patient had fasting labs in January and does not need them now.    HUB can relay if patient calls back.

## 2025-05-06 NOTE — PROGRESS NOTES
Chief Complaint  Hypertension, Hyperlipidemia, and Insomnia    Subjective          Joselyn Segura presents to [facility]      Anxiety-patient is on Lexapro 5 and this is working well    Insomnia-she takes Ambien 10, half tab-1 tab nightly.  Usually helps her sleep though not always.  Has tolerated well    Hypertension- she checks at home and usually is in the 120-130 range.  When she had flu in February she had very low readings of 70/50  She takes metoprolol, but has not had yet today      Hyperlipidemia-she is on Lipitor 3 times a week.    She had flu A in 2/25 and her  had TIA so was a stressful time.  Patient was seen here and CBC did show elevated platelets  She had a bruise in right low back there was no inury that lasted about 2 weeks.  Was in the hospital with her  and was sitting because she felt so bad for long periods of possibly from the sitting        Current Outpatient Medications:     aspirin 81 MG EC tablet, Take 3 tablets by mouth Daily. (Patient taking differently: Take 3 tablets by mouth Daily. Not taking everyday), Disp: , Rfl:     atorvastatin (LIPITOR) 10 MG tablet, Take 1 tablet by mouth Daily., Disp: 90 tablet, Rfl: 1    Coenzyme Q10 (COQ10 PO), Take 1 capsule by mouth Daily., Disp: , Rfl:     escitalopram (Lexapro) 5 MG tablet, Take 1 tablet by mouth Daily., Disp: 90 tablet, Rfl: 3    GLUCOSAMINE-CHONDROITIN PO, Take 1 tablet by mouth 3 (Three) Times a Week., Disp: , Rfl:     metoprolol succinate XL (TOPROL-XL) 50 MG 24 hr tablet, TAKE 1 TABLET BY MOUTH DAILY, Disp: 90 tablet, Rfl: 3    zolpidem (AMBIEN) 10 MG tablet, Take 1 tablet by mouth At Night As Needed for Sleep., Disp: 30 tablet, Rfl: 2    Probiotic Product (PROBIOTIC DAILY PO), Take  by mouth Daily. (Patient not taking: Reported on 5/8/2025), Disp: , Rfl:    The following portions of the patient's history were reviewed and updated as appropriate: allergies, current medications, past  "family history, past medical history, past social history, past surgical history and problem list.     Objective   Vital Signs:   /68 (BP Location: Left arm, Patient Position: Sitting, Cuff Size: Adult)   Pulse 56   Temp 97.1 °F (36.2 °C) (Infrared)   Resp 16   Ht 162.6 cm (64.02\")   Wt 49.4 kg (109 lb)   SpO2 99%   BMI 18.70 kg/m²       Physical exam  Constitutional: oriented to person, place, and time.  well-developed and well-nourished. No distress.   HENT:   Head: Normocephalic and atraumatic.   Eyes: Conjunctivae and EOM are normal.   Cardiovascular: Normal rate, regular rhythm and normal heart sounds.  Exam reveals no gallop and no friction rub.    No murmur heard.  Pulmonary/Chest: Effort normal and breath sounds normal. No respiratory distress.   no wheezes.   Neurological:  alert and oriented to person, place, and time.   Skin: Skin is warm and dry. not diaphoretic.  No abnormal bruising today  Psychiatric:  normal mood and affect. behavior is normal. Judgment and thought content normal.      Physical Exam     Result Review :                    Lab Results   Component Value Date    WBC 10.08 02/17/2025    HGB 13.4 02/17/2025    HCT 39.5 02/17/2025    MCV 87.0 02/17/2025     (H) 02/17/2025     Lab Results   Component Value Date    GLUCOSE 89 02/17/2025    BUN 11 02/17/2025    CREATININE 0.97 02/17/2025     02/17/2025    K 4.1 02/17/2025    CL 99 02/17/2025    CALCIUM 9.9 02/17/2025    PROTEINTOT 7.0 02/17/2025    ALBUMIN 3.8 02/17/2025    ALT 21 02/17/2025    AST 24 02/17/2025    ALKPHOS 82 02/17/2025    BILITOT 0.7 02/17/2025    GLOB 3.2 02/17/2025    AGRATIO 1.2 02/17/2025    BCR 11.3 02/17/2025    ANIONGAP 12.7 02/17/2025    EGFR 57.4 (L) 02/17/2025     Lab Results   Component Value Date    CHOL 203 (H) 01/09/2025    CHLPL 171 05/17/2023    TRIG 126 01/09/2025    HDL 71 (H) 01/09/2025     (H) 01/09/2025     No results found for: \"HGBA1C\"  Lab Results   Component Value Date " "   TSH 1.700 01/09/2025     No results found for: \"RPYJ387\"         Assessment and Plan      Diagnoses and all orders for this visit:    1. Essential hypertension (Primary)-slightly high today but home readings generally good.  She will continue to monitor and let me know if readings stay high  -     CBC & Differential; Future  -     Comprehensive Metabolic Panel; Future    2. Pure hypercholesterolemia-fasting labs will be due 1/26  -     atorvastatin (LIPITOR) 10 MG tablet; Take 1 tablet by mouth Daily.  Dispense: 90 tablet; Refill: 1    3. Primary insomnia-patient has signed controlled substance contract.  [place] appropriate.  Despite age of 84 she has done well with Ambien and she prefers to stay on it because did not have good results with other medications she tried in the past  -     zolpidem (AMBIEN) 10 MG tablet; Take 1 tablet by mouth At Night As Needed for Sleep.  Dispense: 30 tablet; Refill: 2    4. Postmenopausal  -     DEXA Bone Density Axial; Future          Follow Up   Return in about 3 months (around 8/8/2025) for Next scheduled follow up.  Patient was given instructions and counseling regarding her condition or for health maintenance advice. Please see specific information pulled into the AVS if appropriate.   "

## 2025-05-06 NOTE — TELEPHONE ENCOUNTER
Caller: Joselyn Segura    Relationship: Self    Best call back number:   Telephone Information:   Mobile [phone]       What orders are you requesting (i.e. lab or imaging): BLOOD WORK FOR FOLLOW UP 5/8/25    In what timeframe would the patient need to come in: WOULD LIKE TO COME IN THE MORNING OF 5/8 BEFORE HER APPOINTMENT TO COMPLETE LABS    Where will you receive your lab/imaging services: [facility]    Additional notes: PATIENT WAS TOLD SHE WOULD NEED FASTING LABS FOR HER 5/8 APPOINTMENT. PATIENT WOULD LIKE TO REQUEST THE LAB ORDER GO INTO HER CHART EARLY SO SHE CAN COMPLETE THEM EARLIER IN THE DAY ON 5/8 BEFORE HER APPOINTMENT. PLEASE CALL BACK TO DISCUSS OR TO LET THE PATIENT KNOW WHEN COMPLETED.

## 2025-05-08 ENCOUNTER — LAB (OUTPATIENT)
Dept: INTERNAL MEDICINE | Facility: CLINIC | Age: 85
End: 2025-05-08
Payer: MEDICARE

## 2025-05-08 ENCOUNTER — OFFICE VISIT (OUTPATIENT)
Dept: INTERNAL MEDICINE | Facility: CLINIC | Age: 85
End: 2025-05-08
Payer: MEDICARE

## 2025-05-08 VITALS
BODY MASS INDEX: 18.61 KG/M2 | SYSTOLIC BLOOD PRESSURE: 144 MMHG | DIASTOLIC BLOOD PRESSURE: 68 MMHG | TEMPERATURE: 97.1 F | WEIGHT: 109 LBS | HEART RATE: 56 BPM | HEIGHT: 64 IN | RESPIRATION RATE: 16 BRPM | OXYGEN SATURATION: 99 %

## 2025-05-08 DIAGNOSIS — Z78.0 POSTMENOPAUSAL: ICD-10-CM

## 2025-05-08 DIAGNOSIS — I10 ESSENTIAL HYPERTENSION: ICD-10-CM

## 2025-05-08 DIAGNOSIS — I10 ESSENTIAL HYPERTENSION: Primary | Chronic | ICD-10-CM

## 2025-05-08 DIAGNOSIS — F51.01 PRIMARY INSOMNIA: Chronic | ICD-10-CM

## 2025-05-08 DIAGNOSIS — E78.00 PURE HYPERCHOLESTEROLEMIA: Chronic | ICD-10-CM

## 2025-05-08 LAB
ALBUMIN SERPL-MCNC: 4.7 G/DL (ref 3.5–5.2)
ALBUMIN/GLOB SERPL: 1.7 G/DL
ALP SERPL-CCNC: 107 U/L (ref 39–117)
ALT SERPL W P-5'-P-CCNC: 27 U/L (ref 1–33)
ANION GAP SERPL CALCULATED.3IONS-SCNC: 13 MMOL/L (ref 5–15)
AST SERPL-CCNC: 29 U/L (ref 1–32)
BASOPHILS # BLD AUTO: 0.06 10*3/MM3 (ref 0–0.2)
BASOPHILS NFR BLD AUTO: 0.9 % (ref 0–1.5)
BILIRUB SERPL-MCNC: 0.7 MG/DL (ref 0–1.2)
BUN SERPL-MCNC: 18 MG/DL (ref 8–23)
BUN/CREAT SERPL: 19.8 (ref 7–25)
CALCIUM SPEC-SCNC: 10.2 MG/DL (ref 8.6–10.5)
CHLORIDE SERPL-SCNC: 101 MMOL/L (ref 98–107)
CO2 SERPL-SCNC: 26 MMOL/L (ref 22–29)
CREAT SERPL-MCNC: 0.91 MG/DL (ref 0.57–1)
DEPRECATED RDW RBC AUTO: 40.1 FL (ref 37–54)
EGFRCR SERPLBLD CKD-EPI 2021: 62 ML/MIN/1.73
EOSINOPHIL # BLD AUTO: 0.14 10*3/MM3 (ref 0–0.4)
EOSINOPHIL NFR BLD AUTO: 2.1 % (ref 0.3–6.2)
ERYTHROCYTE [DISTWIDTH] IN BLOOD BY AUTOMATED COUNT: 12.5 % (ref 12.3–15.4)
GLOBULIN UR ELPH-MCNC: 2.7 GM/DL
GLUCOSE SERPL-MCNC: 94 MG/DL (ref 65–99)
HCT VFR BLD AUTO: 43.4 % (ref 34–46.6)
HGB BLD-MCNC: 14.2 G/DL (ref 12–15.9)
IMM GRANULOCYTES # BLD AUTO: 0.01 10*3/MM3 (ref 0–0.05)
IMM GRANULOCYTES NFR BLD AUTO: 0.2 % (ref 0–0.5)
LYMPHOCYTES # BLD AUTO: 2.22 10*3/MM3 (ref 0.7–3.1)
LYMPHOCYTES NFR BLD AUTO: 33.4 % (ref 19.6–45.3)
MCH RBC QN AUTO: 29 PG (ref 26.6–33)
MCHC RBC AUTO-ENTMCNC: 32.7 G/DL (ref 31.5–35.7)
MCV RBC AUTO: 88.6 FL (ref 79–97)
MONOCYTES # BLD AUTO: 0.64 10*3/MM3 (ref 0.1–0.9)
MONOCYTES NFR BLD AUTO: 9.6 % (ref 5–12)
NEUTROPHILS NFR BLD AUTO: 3.58 10*3/MM3 (ref 1.7–7)
NEUTROPHILS NFR BLD AUTO: 53.8 % (ref 42.7–76)
NRBC BLD AUTO-RTO: 0 /100 WBC (ref 0–0.2)
PLATELET # BLD AUTO: 251 10*3/MM3 (ref 140–450)
PMV BLD AUTO: 11 FL (ref 6–12)
POTASSIUM SERPL-SCNC: 4.9 MMOL/L (ref 3.5–5.2)
PROT SERPL-MCNC: 7.4 G/DL (ref 6–8.5)
RBC # BLD AUTO: 4.9 10*6/MM3 (ref 3.77–5.28)
SODIUM SERPL-SCNC: 140 MMOL/L (ref 136–145)
WBC NRBC COR # BLD AUTO: 6.65 10*3/MM3 (ref 3.4–10.8)

## 2025-05-08 PROCEDURE — 85025 COMPLETE CBC W/AUTO DIFF WBC: CPT | Performed by: INTERNAL MEDICINE

## 2025-05-08 PROCEDURE — 36415 COLL VENOUS BLD VENIPUNCTURE: CPT | Performed by: INTERNAL MEDICINE

## 2025-05-08 PROCEDURE — 80053 COMPREHEN METABOLIC PANEL: CPT | Performed by: INTERNAL MEDICINE

## 2025-05-08 RX ORDER — ZOLPIDEM TARTRATE 10 MG/1
10 TABLET ORAL NIGHTLY PRN
Qty: 30 TABLET | Refills: 2 | Status: SHIPPED | OUTPATIENT
Start: 2025-05-08

## 2025-05-08 RX ORDER — ATORVASTATIN CALCIUM 10 MG/1
10 TABLET, FILM COATED ORAL DAILY
Qty: 90 TABLET | Refills: 1 | Status: SHIPPED | OUTPATIENT
Start: 2025-05-08

## 2025-07-03 ENCOUNTER — HOSPITAL ENCOUNTER (OUTPATIENT)
Dept: BONE DENSITY | Facility: HOSPITAL | Age: 85
Discharge: HOME | End: 2025-07-03
Admitting: INTERNAL MEDICINE
Payer: MEDICARE

## 2025-07-03 DIAGNOSIS — Z78.0 POSTMENOPAUSAL: ICD-10-CM

## 2025-07-03 PROCEDURE — 77080 DXA BONE DENSITY AXIAL: CPT

## 2025-08-08 ENCOUNTER — OFFICE VISIT (OUTPATIENT)
Dept: INTERNAL MEDICINE | Facility: CLINIC | Age: 85
End: 2025-08-08
Payer: MEDICARE

## 2025-08-08 VITALS
WEIGHT: 108 LBS | RESPIRATION RATE: 14 BRPM | DIASTOLIC BLOOD PRESSURE: 62 MMHG | HEIGHT: 64 IN | BODY MASS INDEX: 18.44 KG/M2 | SYSTOLIC BLOOD PRESSURE: 124 MMHG | OXYGEN SATURATION: 95 % | TEMPERATURE: 97.7 F | HEART RATE: 67 BPM

## 2025-08-08 DIAGNOSIS — E78.00 PURE HYPERCHOLESTEROLEMIA: Chronic | ICD-10-CM

## 2025-08-08 DIAGNOSIS — F51.01 PRIMARY INSOMNIA: Primary | Chronic | ICD-10-CM

## 2025-08-08 DIAGNOSIS — I10 ESSENTIAL HYPERTENSION: Chronic | ICD-10-CM

## 2025-08-08 PROCEDURE — 99214 OFFICE O/P EST MOD 30 MIN: CPT | Performed by: INTERNAL MEDICINE

## 2025-08-08 PROCEDURE — 3074F SYST BP LT 130 MM HG: CPT | Performed by: INTERNAL MEDICINE

## 2025-08-08 PROCEDURE — 3078F DIAST BP <80 MM HG: CPT | Performed by: INTERNAL MEDICINE

## 2025-08-08 PROCEDURE — 1126F AMNT PAIN NOTED NONE PRSNT: CPT | Performed by: INTERNAL MEDICINE

## 2025-08-08 PROCEDURE — 1159F MED LIST DOCD IN RCRD: CPT | Performed by: INTERNAL MEDICINE

## 2025-08-08 PROCEDURE — 1160F RVW MEDS BY RX/DR IN RCRD: CPT | Performed by: INTERNAL MEDICINE

## 2025-08-08 RX ORDER — ZOLPIDEM TARTRATE 10 MG/1
10 TABLET ORAL NIGHTLY PRN
Qty: 30 TABLET | Refills: 2 | Status: SHIPPED | OUTPATIENT
Start: 2025-08-08